# Patient Record
Sex: MALE | Race: WHITE | Employment: FULL TIME | URBAN - METROPOLITAN AREA
[De-identification: names, ages, dates, MRNs, and addresses within clinical notes are randomized per-mention and may not be internally consistent; named-entity substitution may affect disease eponyms.]

---

## 2017-11-10 ENCOUNTER — OFFICE VISIT (OUTPATIENT)
Dept: FAMILY MEDICINE | Facility: CLINIC | Age: 52
End: 2017-11-10
Payer: COMMERCIAL

## 2017-11-10 VITALS
RESPIRATION RATE: 18 BRPM | SYSTOLIC BLOOD PRESSURE: 138 MMHG | HEIGHT: 71 IN | WEIGHT: 197.1 LBS | DIASTOLIC BLOOD PRESSURE: 80 MMHG | OXYGEN SATURATION: 100 % | HEART RATE: 64 BPM | TEMPERATURE: 97.5 F | BODY MASS INDEX: 27.59 KG/M2

## 2017-11-10 DIAGNOSIS — B35.1 ONYCHOMYCOSIS: ICD-10-CM

## 2017-11-10 DIAGNOSIS — Z00.00 ROUTINE GENERAL MEDICAL EXAMINATION AT A HEALTH CARE FACILITY: Primary | ICD-10-CM

## 2017-11-10 DIAGNOSIS — Z12.11 SPECIAL SCREENING FOR MALIGNANT NEOPLASMS, COLON: ICD-10-CM

## 2017-11-10 LAB
CHOLEST SERPL-MCNC: 230 MG/DL
GLUCOSE SERPL-MCNC: 98 MG/DL (ref 70–99)
HDLC SERPL-MCNC: 59 MG/DL
LDLC SERPL CALC-MCNC: 163 MG/DL
NONHDLC SERPL-MCNC: 171 MG/DL
PSA SERPL-ACNC: 0.74 UG/L (ref 0–4)
TRIGL SERPL-MCNC: 42 MG/DL

## 2017-11-10 PROCEDURE — 82947 ASSAY GLUCOSE BLOOD QUANT: CPT | Performed by: FAMILY MEDICINE

## 2017-11-10 PROCEDURE — 99396 PREV VISIT EST AGE 40-64: CPT | Performed by: FAMILY MEDICINE

## 2017-11-10 PROCEDURE — 80061 LIPID PANEL: CPT | Performed by: FAMILY MEDICINE

## 2017-11-10 PROCEDURE — 36415 COLL VENOUS BLD VENIPUNCTURE: CPT | Performed by: FAMILY MEDICINE

## 2017-11-10 PROCEDURE — G0103 PSA SCREENING: HCPCS | Performed by: FAMILY MEDICINE

## 2017-11-10 RX ORDER — TERBINAFINE HYDROCHLORIDE 250 MG/1
250 TABLET ORAL DAILY
Qty: 90 TABLET | Refills: 0 | Status: SHIPPED | OUTPATIENT
Start: 2017-11-10 | End: 2018-10-24

## 2017-11-10 ASSESSMENT — PAIN SCALES - GENERAL: PAINLEVEL: NO PAIN (0)

## 2017-11-10 NOTE — NURSING NOTE
"Chief Complaint   Patient presents with     Physical     pt. is fasting       Initial /80 (BP Location: Right arm, Patient Position: Sitting, Cuff Size: Adult Regular)  Pulse 64  Temp 97.5  F (36.4  C) (Oral)  Resp 18  Ht 1.803 m (5' 11\")  Wt 89.4 kg (197 lb 1.6 oz)  SpO2 100%  BMI 27.49 kg/m2 Estimated body mass index is 27.49 kg/(m^2) as calculated from the following:    Height as of this encounter: 1.803 m (5' 11\").    Weight as of this encounter: 89.4 kg (197 lb 1.6 oz).  Medication Reconciliation: alyson Hickey      "

## 2017-11-10 NOTE — MR AVS SNAPSHOT
After Visit Summary   11/10/2017    Carlos A Crespo    MRN: [id number]           Patient Information     Date Of Birth          1965        Visit Information        Provider Department      11/10/2017 10:40 AM Frances Upton MD [facility]        Today's Diagnoses     Routine general medical examination at a health care facility    -  1    Onychomycosis        Special screening for malignant neoplasms, colon          Care Instructions      Preventive Health Recommendations  Male Ages 50 - 64    Yearly exam:             See your health care provider every year in order to  o   Review health changes.   o   Discuss preventive care.    o   Review your medicines if your doctor has prescribed any.     Have a cholesterol test every 5 years, or more frequently if you are at risk for high cholesterol/heart disease.     Have a diabetes test (fasting glucose) every three years. If you are at risk for diabetes, you should have this test more often.     Have a colonoscopy at age 50, or have a yearly FIT test (stool test). These exams will check for colon cancer.      Talk with your health care provider about whether or not a prostate cancer screening test (PSA) is right for you.    You should be tested each year for STDs (sexually transmitted diseases), if you re at risk.     Shots: Get a flu shot each year. Get a tetanus shot every 10 years.     Nutrition:    Eat at least 5 servings of fruits and vegetables daily.     Eat whole-grain bread, whole-wheat pasta and brown rice instead of white grains and rice.     Talk to your provider about Calcium and Vitamin D.     Lifestyle    Exercise for at least 150 minutes a week (30 minutes a day, 5 days a week). This will help you control your weight and prevent disease.     Limit alcohol to one drink per day.     No smoking.     Wear sunscreen to prevent skin cancer.     See your dentist every six months for an exam and cleaning.     See your  eye doctor every 1 to 2 years.            Follow-ups after your visit        Additional Services     GASTROENTEROLOGY ADULT REF PROCEDURE ONLY       Last Lab Result: Creatinine (mg/dL)       Date                     Value                 09/23/2014               0.94             ----------  Body mass index is 27.49 kg/(m^2).     Needed:  No  Language:  English    Patient will be contacted to schedule procedure.     Please be aware that coverage of these services is subject to the terms and limitations of your health insurance plan.  Call member services at your health plan with any benefit or coverage questions.  Any procedures must be performed at a [facility] facility OR coordinated by your clinic's referral office.    Please bring the following with you to your appointment:    (1) Any X-Rays, CTs or MRIs which have been performed.  Contact the facility where they were done to arrange for  prior to your scheduled appointment.    (2) List of current medications   (3) This referral request   (4) Any documents/labs given to you for this referral                  Follow-up notes from your care team     Return in about 1 year (around 11/10/2018).      Who to contact     If you have questions or need follow up information about today's clinic visit or your schedule please contact [facility] directly at [phone].  Normal or non-critical lab and imaging results will be communicated to you by MyChart, letter or phone within 4 business days after the clinic has received the results. If you do not hear from us within 7 days, please contact the clinic through MyChart or phone. If you have a critical or abnormal lab result, we will notify you by phone as soon as possible.  Submit refill requests through [employer] or call your pharmacy and they will forward the refill request to us. Please allow 3 business days for your refill to be completed.          Additional Information About Your Visit    "     MyChart Information     [employer] gives you secure access to your electronic health record. If you see a primary care provider, you can also send messages to your care team and make appointments. If you have questions, please call your primary care clinic.  If you do not have a primary care provider, please call [phone] and they will assist you.        Care EveryWhere ID     This is your Care EveryWhere ID. This could be used by other organizations to access your [facility] medical records  [id number]        Your Vitals Were     Pulse Temperature Respirations Height Pulse Oximetry BMI (Body Mass Index)    64 97.5  F (36.4  C) (Oral) 18 1.803 m (5' 11\") 100% 27.49 kg/m2       Blood Pressure from Last 3 Encounters:   11/10/17 138/80   10/25/16 134/82   06/02/16 122/79    Weight from Last 3 Encounters:   11/10/17 89.4 kg (197 lb 1.6 oz)   10/25/16 88 kg (194 lb)   06/02/16 86.1 kg (189 lb 14.4 oz)              We Performed the Following     GASTROENTEROLOGY ADULT REF PROCEDURE ONLY     Glucose     Lipid panel reflex to direct LDL Fasting     Prostate spec antigen screen          Today's Medication Changes          These changes are accurate as of: 11/10/17 11:26 AM.  If you have any questions, ask your nurse or doctor.               Start taking these medicines.        Dose/Directions    terbinafine 250 MG tablet   Commonly known as:  lamISIL   Used for:  Onychomycosis   Started by:  Frances Upton MD        Dose:  250 mg   Take 1 tablet (250 mg) by mouth daily   Quantity:  90 tablet   Refills:  0            Where to get your medicines      These medications were sent to [facility]/pharmacy #[id number] - [place], [place] - [address]  [address] [facility] [address]     Phone:  [phone]     terbinafine 250 MG tablet                Primary Care Provider Office Phone # Fax #    Frances Upton [phone] [address] [address]        Equal " Access to Services     [facility]: Hadii aad ku hadangelikanaina Zenobiaradha, wasejalda luqadaha, qaybta karayabel schmidt. So [facility] [phone].    ATENCIÓN: Si habla español, tiene a munoz disposición servicios gratuitos de asistencia lingüística. Llame al [phone].    We comply with applicable federal civil rights laws and [place] laws. We do not discriminate on the basis of race, color, national origin, age, disability, sex, sexual orientation, or gender identity.            Thank you!     Thank you for choosing [facility]  for your care. Our goal is always to provide you with excellent care. Hearing back from our patients is one way we can continue to improve our services. Please take a few minutes to complete the written survey that you may receive in the mail after your visit with us. Thank you!             Your Updated Medication List - Protect others around you: Learn how to safely use, store and throw away your medicines at www.disposemymeds.org.          This list is accurate as of: 11/10/17 11:26 AM.  Always use your most recent med list.                   Brand Name Dispense Instructions for use Diagnosis    albuterol 108 (90 BASE) MCG/ACT Inhaler    PROAIR HFA/PROVENTIL HFA/VENTOLIN HFA    1 Inhaler    Inhale 2 puffs into the lungs every 4 hours as needed for shortness of breath / dyspnea or wheezing    Viral URI with cough       ibuprofen 200 MG tablet    ADVIL/MOTRIN     Take 200 mg by mouth every 4 hours as needed for mild pain        terbinafine 250 MG tablet    lamISIL    90 tablet    Take 1 tablet (250 mg) by mouth daily    Onychomycosis

## 2017-11-10 NOTE — PROGRESS NOTES
SUBJECTIVE:   CC: Calros A Crespo is an 52 year old male who presents for preventative health visit.     Healthy Habits:    Do you get at least three servings of calcium containing foods daily (dairy, green leafy vegetables, etc.)? yes    Amount of exercise or daily activities, outside of work: 8 hour(s) per day    Problems taking medications regularly No    Medication side effects: No    Have you had an eye exam in the past two years? no    Do you see a dentist twice per year? yes    Do you have sleep apnea, excessive snoring or daytime drowsiness?no        Today's PHQ-2 Score:   PHQ-2 ( 1999 [employer]) 11/10/2017 10/25/2016   Q1: Little interest or pleasure in doing things 0 0   Q2: Feeling down, depressed or hopeless 0 2   PHQ-2 Score 0 2   Q1: Little interest or pleasure in doing things - -   Q2: Feeling down, depressed or hopeless - -   PHQ-2 Score - -         Abuse: Current or Past(Physical, Sexual or Emotional)- No  Do you feel safe in your environment - Yes  Social History   Substance Use Topics     Smoking status: Never Smoker     Smokeless tobacco: Never Used     Alcohol use Yes      Comment: occassional - 1 to 2 times a year     The patient does not drink >3 drinks per day nor >7 drinks per week.    Last PSA: No results found for: PSA    Reviewed orders with patient. Reviewed health maintenance and updated orders accordingly - Yes  Labs reviewed in EPIC    Reviewed and updated as needed this visit by clinical staff  Tobacco  Allergies  Meds  Med Hx  Surg Hx  Fam Hx  Soc Hx        Reviewed and updated as needed this visit by Provider        Here today for routine checkup  For the most part is doing well but does note some possible fungus of toenails. Had some trauma to his right great toenail earlier this year and it has been growing funny ever since but that's not really the nail the bothers him that much. His smaller nails are getting thickened and yellow it hard to cut      ROS:  C: NEGATIVE for  "fever, chills, change in weight  INTEGUMENTARY/SKIN: As above  E: NEGATIVE for vision changes or irritation  ENT: NEGATIVE for ear, mouth and throat problems  R: NEGATIVE for significant cough or SOB  CV: NEGATIVE for chest pain, palpitations or peripheral edema  GI: NEGATIVE for nausea, abdominal pain, heartburn, or change in bowel habits   male: negative for dysuria, hematuria, decreased urinary stream, erectile dysfunction, urethral discharge  M: NEGATIVE for significant arthralgias or myalgia  N: NEGATIVE for weakness, dizziness or paresthesias  P: NEGATIVE for changes in mood or affect    OBJECTIVE:   /80 (BP Location: Right arm, Patient Position: Sitting, Cuff Size: Adult Regular)  Pulse 64  Temp 97.5  F (36.4  C) (Oral)  Resp 18  Ht 1.803 m (5' 11\")  Wt 89.4 kg (197 lb 1.6 oz)  SpO2 100%  BMI 27.49 kg/m2  EXAM:  GENERAL: healthy, alert and no distress  EYES: Eyes grossly normal to inspection, PERRL and conjunctivae and sclerae normal  HENT: ear canals and TM's normal, nose and mouth without ulcers or lesions  NECK: no adenopathy, no asymmetry, masses, or scars and thyroid normal to palpation  RESP: lungs clear to auscultation - no rales, rhonchi or wheezes  CV: regular rate and rhythm, normal S1 S2, no S3 or S4, no murmur, click or rub, no peripheral edema and peripheral pulses strong  ABDOMEN: soft, nontender, no hepatosplenomegaly, no masses and bowel sounds normal  MS: no gross musculoskeletal defects noted, no edema  SKIN: no suspicious lesions or rashes.  Right great toenail is thick and dark and curved medially.  The second and fourth toenails are thickened with a dark and yellow subungual debris  NEURO: Normal strength and tone, mentation intact and speech normal  PSYCH: mentation appears normal, affect normal/bright    ASSESSMENT/PLAN:   1. Routine general medical examination at a health care facility  Has a history some fairly elevated cholesterol like to recheck this and see what the " "status is. Biometric screening form today  - Glucose  - Lipid panel reflex to direct LDL Fasting  - PSA  We will set up colon cancer screening as well    2. Onychomycosis  Discussed mechanism of action of the proposed medication, as well as potential effects, both good and bad.  Patient expressed understanding and agreed with treatment.   - terbinafine (LAMISIL) 250 MG tablet; Take 1 tablet (250 mg) by mouth daily  Dispense: 90 tablet; Refill: 0    COUNSELING:  Reviewed preventive health counseling, as reflected in patient instructions       Regular exercise       Healthy diet/nutrition       Vision screening       Colon cancer screening       Prostate cancer screening           reports that he has never smoked. He has never used smokeless tobacco.      Estimated body mass index is 27.49 kg/(m^2) as calculated from the following:    Height as of this encounter: 1.803 m (5' 11\").    Weight as of this encounter: 89.4 kg (197 lb 1.6 oz). colon can scre          Counseling Resources:  ATP IV Guidelines  Pooled Cohorts Equation Calculator  FRAX Risk Assessment  ICSI Preventive Guidelines  Dietary Guidelines for Americans, 2010  USDA's MyPlate  ASA Prophylaxis  Lung CA Screening    Frances Upton MD  [facility]  "

## 2017-11-15 DIAGNOSIS — J06.9 VIRAL URI WITH COUGH: ICD-10-CM

## 2017-11-15 NOTE — TELEPHONE ENCOUNTER
Provider to advise.    Charline Olvera RN   [facility] Triage

## 2017-11-15 NOTE — TELEPHONE ENCOUNTER
Reason for Call:  Medication or medication refill:    Do you use a [facility]?  Name of the pharmacy and phone number for the current request:  [facility]/pharmacy #[id number] - [place], [place] - [address]    Name of the medication requested:     Other request: terbinafine (LAMISIL) 250 MG tablet was prescribed on 11/10/2017 for toenail fungus and now has ringing in his ear and asking is this could be due to the medication?    Can we leave a detailed message on this number? YES    Phone number patient can be reached at: Work number on file:  [phone] (work)    Best Time: any    Call taken on 11/15/2017 at 8:02 AM by Lisa [facility]

## 2017-11-15 NOTE — TELEPHONE ENCOUNTER
Informed patient - pt agreeable to plan and will follow up as recommended/as needed if sx's return    Will Cj SINGLETARY

## 2017-11-15 NOTE — TELEPHONE ENCOUNTER
Potentially, though I wouldn't think of it as a common side effect.  Here's what he should do - stop the medicine for a few days and let it work out of his system until his symptoms have resolved. Then he could try it again and see if the symptoms return. It's not a dangerous side effect, so there would be no harm in trying it a second time. This way we might know for sure

## 2017-11-22 ENCOUNTER — OFFICE VISIT (OUTPATIENT)
Dept: URGENT CARE | Facility: URGENT CARE | Age: 52
End: 2017-11-22
Payer: COMMERCIAL

## 2017-11-22 VITALS
HEART RATE: 65 BPM | TEMPERATURE: 98.1 F | OXYGEN SATURATION: 99 % | SYSTOLIC BLOOD PRESSURE: 141 MMHG | WEIGHT: 201 LBS | DIASTOLIC BLOOD PRESSURE: 97 MMHG | BODY MASS INDEX: 28.03 KG/M2

## 2017-11-22 DIAGNOSIS — H93.11 TINNITUS OF RIGHT EAR: Primary | ICD-10-CM

## 2017-11-22 PROCEDURE — 99213 OFFICE O/P EST LOW 20 MIN: CPT | Performed by: NURSE PRACTITIONER

## 2017-11-22 ASSESSMENT — ENCOUNTER SYMPTOMS
NEUROLOGICAL NEGATIVE: 1
CARDIOVASCULAR NEGATIVE: 1
CONSTITUTIONAL NEGATIVE: 1
RESPIRATORY NEGATIVE: 1
MUSCULOSKELETAL NEGATIVE: 1
EYES NEGATIVE: 1
PSYCHIATRIC NEGATIVE: 1
GASTROINTESTINAL NEGATIVE: 1

## 2017-11-23 NOTE — NURSING NOTE
"Chief Complaint   Patient presents with     Ear Problem     Pt c/o right ear problem.       Initial BP (!) 141/97 (BP Location: Left arm, Patient Position: Chair, Cuff Size: Adult Large)  Pulse 65  Temp 98.1  F (36.7  C) (Oral)  Wt 201 lb (91.2 kg)  SpO2 99%  BMI 28.03 kg/m2 Estimated body mass index is 28.03 kg/(m^2) as calculated from the following:    Height as of 11/10/17: 5' 11\" (1.803 m).    Weight as of this encounter: 201 lb (91.2 kg).  Medication Reconciliation: complete     Tali Thornton CMA (AAMA)      "

## 2017-11-23 NOTE — PATIENT INSTRUCTIONS
Tinnitus (Ringing in the Ears)     Treatment may include maskers and hearing aids.     Tinnitus is the term for a noise in your ear not caused by an outside sound. The noise might be a ringing, clicking, hiss, or roar. It can vary in pitch and may be soft or quite loud. For some people, tinnitus is a minor nuisance. But for others, the noise can make it hard to hear, work, and even sleep. When tinnitus can't be cured, a number of treatments may offer relief.  What causes tinnitus?  Loud noises, hearing loss, and ear wax can cause tinnitus. So can certain medicines. Large amounts of aspirin or caffeine are sometimes to blame. In many cases, the exact cause of tinnitus is unknown.  How is tinnitus treated?  Identifying and removing the cause is the best way to treat tinnitus. For that reason, your healthcare provider may refer you to an otolaryngologist (ear, nose, and throat doctor). Your hearing may also be checked by an audiologist (hearing specialist). If you have hearing loss, wearing a hearing aid may help your tinnitus. When the cause can't be found, the tinnitus itself may be treated. Some of the treatments are listed below, and your healthcare provider can tell you more about them:    Maskers are small devices that look like hearing aids. They emit a pleasant sound that helps cover up the ringing in your ears. Hearing aids and maskers are sometimes used together.    Medicines that treat anxiety and depression may ease tinnitus in some people.    Hypnosis or relaxation therapy may help head noise seem less severe.    Tinnitus retraining therapy combines counseling and maskers. Both can help take your mind off the tinnitus.  For more information    American Speech-Hearing-Language Association [phone] www.mau.org    American Tinnitus Association [phone] www.fannie.org    National [place] on Deafness and other Communication Disorders [phone] www.nidcd.nih.gov   Date Last Reviewed: 7/1/2016     4328-0531 [employer]. [address] [place] [address], [place] [address]. All rights reserved. This information is not intended as a substitute for professional medical care. Always follow your healthcare professional's instructions.

## 2017-11-23 NOTE — PROGRESS NOTES
SUBJECTIVE:   Carlos A Crespo is a 52 year old male presenting with a chief complaint of   Chief Complaint   Patient presents with     Ear Problem     Pt c/o right ear problem.   .    Onset of symptoms was 9 day(s) ago.  Course of illness is same.    Severity moderate  Current and Associated symptoms: ear pain right, ringing noise  Treatment measures tried include Fluids and Rest, ibuprofen.  Predisposing factors include None.  No cold symptoms      Review of Systems   Constitutional: Negative.    HENT: Positive for tinnitus. Negative for ear discharge, ear pain and hearing loss.    Eyes: Negative.    Respiratory: Negative.    Cardiovascular: Negative.    Gastrointestinal: Negative.    Genitourinary: Negative.    Musculoskeletal: Negative.    Skin: Negative.    Neurological: Negative.    Endo/Heme/Allergies: Negative.    Psychiatric/Behavioral: Negative.          Past Medical History:   Diagnosis Date     Allergic rhinitis, cause unspecified      Carpal tunnel syndrome     RIght wrist off and on.     Low back pain      Scoliosis of thoracic spine      Current Outpatient Prescriptions   Medication Sig Dispense Refill     terbinafine (LAMISIL) 250 MG tablet Take 1 tablet (250 mg) by mouth daily 90 tablet 0     ibuprofen (ADVIL,MOTRIN) 200 MG tablet Take 200 mg by mouth every 4 hours as needed for mild pain       albuterol (PROAIR HFA, PROVENTIL HFA, VENTOLIN HFA) 108 (90 BASE) MCG/ACT inhaler Inhale 2 puffs into the lungs every 4 hours as needed for shortness of breath / dyspnea or wheezing 1 Inhaler 0     Social History   Substance Use Topics     Smoking status: Never Smoker     Smokeless tobacco: Never Used     Alcohol use Yes      Comment: occassional - 1 to 2 times a year       OBJECTIVE  BP (!) 141/97 (BP Location: Left arm, Patient Position: Chair, Cuff Size: Adult Large)  Pulse 65  Temp 98.1  F (36.7  C) (Oral)  Wt 201 lb (91.2 kg)  SpO2 99%  BMI 28.03 kg/m2    Physical Exam   Constitutional: He is  oriented to person, place, and time.   HENT:   Head: Normocephalic and atraumatic.   Right Ear: External ear normal.   Left Ear: External ear normal.   Nose: Nose normal.   Mouth/Throat: Oropharynx is clear and moist.   Eyes: Conjunctivae and EOM are normal. Pupils are equal, round, and reactive to light.   Neck: Normal range of motion. Neck supple.   Cardiovascular: Normal rate, regular rhythm and normal heart sounds.    Pulmonary/Chest: Effort normal and breath sounds normal.   Neurological: He is alert and oriented to person, place, and time.   Psychiatric: Affect normal.   Nursing note and vitals reviewed.      ASSESSMENT:    (H93.11) Tinnitus of right ear  (primary encounter diagnosis)  Discussed possible causes of tinnitus, advised decreasing caffeine    PLAN:  OTOLARYNGOLOGY REFERRAL to follow up if needed         ADRI Choi CNP

## 2017-12-14 ENCOUNTER — MYC MEDICAL ADVICE (OUTPATIENT)
Dept: FAMILY MEDICINE | Facility: CLINIC | Age: 52
End: 2017-12-14

## 2017-12-14 DIAGNOSIS — J06.9 VIRAL URI WITH COUGH: ICD-10-CM

## 2017-12-14 RX ORDER — ALBUTEROL SULFATE 90 UG/1
2 AEROSOL, METERED RESPIRATORY (INHALATION) EVERY 4 HOURS PRN
Qty: 1 INHALER | Refills: 0 | Status: CANCELLED | OUTPATIENT
Start: 2017-12-14

## 2017-12-15 NOTE — TELEPHONE ENCOUNTER
Pt returned call    Best number to reach caller: Home number on file [phone] (home)    Is it ok to leave a detailed message: unsure - had to hang up     Patient diagnosed with some type of viral issue; hung up, will call back

## 2017-12-15 NOTE — TELEPHONE ENCOUNTER
Patient reports that medication is for a viral infection.  Patient denies having asthma. RN explained that he would either need to be seen in clinic or have an E-Visit done.  Patient states that he is unable to be seen today.  RN informed the patient of Urgent Care hours.  Patient states that he was hoping to get an inhaler without being seen.  Patient upset states that the clinic doesn't care.  RN tried to explain, but patient kept repeating that the clinic doesn't care if he dies on the floor.  RN said that if he is having breathing problems he needs to go to the ER.  Patient repeats that the clinic doesn't care and said good bye.  Throughout the conversation patient was talking in complete sentences.  RN didn't hear any audible wheezing or coughing.    Yoana Vanegas RN

## 2017-12-15 NOTE — TELEPHONE ENCOUNTER
Is this for an illness?  If so, needs OFV or phone  E- visit    Or does he carry a diagnosis of asthma we don't know about?

## 2017-12-15 NOTE — TELEPHONE ENCOUNTER
This writer attempted to contact Carlos A on 12/15/17      Reason for call verify if sick or asthma diagnosis and left detailed message.      If patient calls back:   Patient contacted by clinic RN team. Inform patient that someone from the team will contact them, document that pt called and route to care team. .        Katt Bunch, KIERAN

## 2017-12-15 NOTE — TELEPHONE ENCOUNTER
Routing refill request to provider for review/approval because:  RN can not fill with the associated diagnosis.    Katt Bunch RN, [facility]

## 2017-12-15 NOTE — TELEPHONE ENCOUNTER
Reason for Call:  Medication or medication refill:    Do you use a [facility]?  Name of the pharmacy and phone number for the current request:  [facility]    Name of the medication requested: Pro-Air Inhaler     Other request: Please call when approved.    Can we leave a detailed message on this number? YES    Phone number patient can be reached at: Cell number on file:    Telephone Information:   Mobile [phone]       Best Time: any     Call taken on 12/15/2017 at 11:55 AM by Soumya Castaneda

## 2017-12-19 RX ORDER — ALBUTEROL SULFATE 90 UG/1
2 AEROSOL, METERED RESPIRATORY (INHALATION) EVERY 4 HOURS PRN
Qty: 1 INHALER | Refills: 0 | OUTPATIENT
Start: 2017-12-19

## 2017-12-19 NOTE — TELEPHONE ENCOUNTER
Refused. Please see encounter dated 12/14/2017. Per Dr. Upton, patient needs to be seen. Patient notified via phone and MyChart.    Charline Olvera RN   [facility]

## 2017-12-22 ENCOUNTER — OFFICE VISIT (OUTPATIENT)
Dept: FAMILY MEDICINE | Facility: CLINIC | Age: 52
End: 2017-12-22
Payer: COMMERCIAL

## 2017-12-22 VITALS
WEIGHT: 207 LBS | TEMPERATURE: 98.4 F | BODY MASS INDEX: 28.87 KG/M2 | OXYGEN SATURATION: 99 % | RESPIRATION RATE: 12 BRPM | HEART RATE: 69 BPM | SYSTOLIC BLOOD PRESSURE: 130 MMHG | DIASTOLIC BLOOD PRESSURE: 90 MMHG

## 2017-12-22 DIAGNOSIS — R03.0 ELEVATED BLOOD-PRESSURE READING WITHOUT DIAGNOSIS OF HYPERTENSION: ICD-10-CM

## 2017-12-22 DIAGNOSIS — J20.9 ACUTE BRONCHITIS, UNSPECIFIED ORGANISM: Primary | ICD-10-CM

## 2017-12-22 PROCEDURE — 99213 OFFICE O/P EST LOW 20 MIN: CPT | Performed by: PHYSICIAN ASSISTANT

## 2017-12-22 RX ORDER — ALBUTEROL SULFATE 90 UG/1
2 AEROSOL, METERED RESPIRATORY (INHALATION) EVERY 6 HOURS PRN
Qty: 1 INHALER | Refills: 0 | Status: SHIPPED | OUTPATIENT
Start: 2017-12-22 | End: 2019-11-09

## 2017-12-22 RX ORDER — AZITHROMYCIN 250 MG/1
TABLET, FILM COATED ORAL
Qty: 6 TABLET | Refills: 0 | Status: SHIPPED | OUTPATIENT
Start: 2017-12-22 | End: 2018-10-24

## 2017-12-22 NOTE — PATIENT INSTRUCTIONS
Schedule colonoscopy at [facility] ([phone]).    zithromax daily for 5 days.     Use albuterol inhaler up to every 4 hours as needed for cough.   Return urgently if any change in symptoms like increasing cough, shortness of breath or other change in symptoms.     Work on diet - no more than 2 grams sodium daily and follow up with one of us In about a month to discuss blood pressure medication.     Schedule colonoscopy at [facility] ([phone]).      NON PRESCRIPTION TREATMENT    Mucinex 600 mg 2 tabs twice a day  Increase humidity to 30-40% in bedroom at night - vaporizer  Avoid decongestant  Saline nasal spray as needed  Increase fluid intake  Benadryl 25mg 1/2 - 1 hour before bed time  Maintain 8 hr minimum of sleep at night  Robitussin DM cough gels for cough

## 2017-12-22 NOTE — PROGRESS NOTES
SUBJECTIVE:   Carlos A Crespo is a 52 year old male who presents to clinic today for the following health issues:      Acute Illness   Acute illness concerns: uri  Onset: 3 weeks ago    Fever: no    Chills/Sweats: no    Headache (location?): YES    Sinus Pressure:YES    Conjunctivitis:  no    Ear Pain: YES: left    Rhinorrhea: YES    Congestion: YES    Sore Throat: no      Cough: YES    Wheeze: no    Decreased Appetite: no    Nausea: no    Vomiting: no    Diarrhea:  no    Dysuria/Freq.: no    Fatigue/Achiness: no    Sick/Strep Exposure: no     Therapies Tried and outcome: cepacol seemed to help a little, decongestant as well which helped a little    Cough productive with clear sputum.  Off blood pressure med for sometime.   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     Reports that he eats a lot of salads but does eat more red meat than he should.      Problem list and histories reviewed & adjusted, as indicated.  Additional history: as documented    Patient Active Problem List   Diagnosis     Scoliosis of thoracic spine     Hyperlipidemia LDL goal <100     Onychomycosis     Past Surgical History:   Procedure Laterality Date     NO HISTORY OF SURGERY         Social History   Substance Use Topics     Smoking status: Never Smoker     Smokeless tobacco: Never Used     Alcohol use Yes      Comment: occassional - 1 to 2 times a year     Family History   Problem Relation Age of Onset     Hypertension Mother      Breast Cancer Mother      DIABETES Father      Hypertension Father      DIABETES Maternal Uncle      CEREBROVASCULAR DISEASE No family hx of      Coronary Artery Disease No family hx of          Current Outpatient Prescriptions   Medication Sig Dispense Refill             1 Inhaler 0     terbinafine (LAMISIL) 250 MG tablet Take 1 tablet (250 mg) by mouth daily 90 tablet 0         Reviewed and updated as needed this visit by clinical staffTobacco  Allergies  Meds  Med Hx  Surg  Hx  Fam Hx  Soc Hx      Reviewed and updated as needed this visit by Provider  Allergies  Meds  Problems         ROS:  Constitutional, HEENT, cardiovascular, pulmonary, gi and gu systems are negative, except as otherwise noted.      OBJECTIVE:   /90 (BP Location: Right arm, Patient Position: Sitting, Cuff Size: Adult Regular)  Pulse 69  Temp 98.4  F (36.9  C) (Oral)  Resp 12  Wt 93.9 kg (207 lb)  SpO2 99%  BMI 28.87 kg/m2  Body mass index is 28.87 kg/(m^2).  GENERAL: healthy, alert and no distress  EYES: Eyes grossly normal to inspection, PERRL and conjunctivae and sclerae normal  HENT: ear canals and TM's normal, nose and mouth without ulcers or lesions  NECK: no adenopathy, no asymmetry, masses, or scars and thyroid normal to palpation  RESP: lungs clear to auscultation - no rales, rhonchi or wheezes  CV: regular rate and rhythm, normal S1 S2, no S3 or S4, no murmur, click or rub, no peripheral edema and peripheral pulses strong  ABDOMEN: soft, nontender, no hepatosplenomegaly, no masses and bowel sounds normal  MS: no gross musculoskeletal defects noted, no edema    Diagnostic Test Results:  none     ASSESSMENT/PLAN:             1. Acute bronchitis, unspecified organism  Albuterol has been helpful in past   - azithromycin (ZITHROMAX) 250 MG tablet; Two tablets first day, then one tablet daily for four days.  Dispense: 6 tablet; Refill: 0  - albuterol (PROAIR HFA/PROVENTIL HFA/VENTOLIN HFA) 108 (90 BASE) MCG/ACT Inhaler; Inhale 2 puffs into the lungs every 6 hours as needed for shortness of breath / dyspnea or wheezing  Dispense: 1 Inhaler; Refill: 0    2. Elevated blood-pressure reading without diagnosis of hypertension  Patient wishes to try dietary and lifestye management of blood pressure - follow up with us in one month    Patient Instructions   Schedule colonoscopy at [facility] ([phone]).    zithromax daily for 5 days.     Use albuterol inhaler  up to every 4 hours as needed for cough.   Return urgently if any change in symptoms like increasing cough, shortness of breath or other change in symptoms.     Work on diet - no more than 2 grams sodium daily and follow up with one of us In about a month to discuss blood pressure medication.     Schedule colonoscopy at [facility] ([phone]).      NON PRESCRIPTION TREATMENT    Mucinex 600 mg 2 tabs twice a day  Increase humidity to 30-40% in bedroom at night - vaporizer  Avoid decongestant  Saline nasal spray as needed  Increase fluid intake  Benadryl 25mg 1/2 - 1 hour before bed time  Maintain 8 hr minimum of sleep at night  Robitussin DM cough gels for cough           Mariely Steele PA-C  [facility]

## 2017-12-22 NOTE — MR AVS SNAPSHOT
After Visit Summary   12/22/2017    Carlos A Crespo    MRN: [id number]           Patient Information     Date Of Birth          1965        Visit Information        Provider Department      12/22/2017 2:20 PM Mariely Steele PA-C [facility]        Today's Diagnoses     Acute bronchitis, unspecified organism    -  1    Screen for colon cancer        Need for prophylactic vaccination and inoculation against influenza          Care Instructions    Schedule colonoscopy at [facility] ([phone]).    zithromax daily for 5 days.     Use albuterol inhaler up to every 4 hours as needed for cough.   Return urgently if any change in symptoms like increasing cough, shortness of breath or other change in symptoms.     Work on diet - no more than 2 grams sodium daily and follow up with one of us In about a month to discuss blood pressure medication.     Schedule colonoscopy at [facility] ([phone]).      NON PRESCRIPTION TREATMENT    Mucinex 600 mg 2 tabs twice a day  Increase humidity to 30-40% in bedroom at night - vaporizer  Avoid decongestant  Saline nasal spray as needed  Increase fluid intake  Benadryl 25mg 1/2 - 1 hour before bed time  Maintain 8 hr minimum of sleep at night  Robitussin DM cough gels for cough              Follow-ups after your visit        Who to contact     If you have questions or need follow up information about today's clinic visit or your schedule please contact [facility] directly at [phone].  Normal or non-critical lab and imaging results will be communicated to you by MyChart, letter or phone within 4 business days after the clinic has received the results. If you do not hear from us within 7 days, please contact the clinic through MyChart or phone. If you have a critical or abnormal lab result, we will notify you by phone as soon as  possible.  Submit refill requests through [employer] or call your pharmacy and they will forward the refill request to us. Please allow 3 business days for your refill to be completed.          Additional Information About Your Visit        [employer] Information     [employer] gives you secure access to your electronic health record. If you see a primary care provider, you can also send messages to your care team and make appointments. If you have questions, please call your primary care clinic.  If you do not have a primary care provider, please call [phone] and they will assist you.        Care EveryWhere ID     This is your Care EveryWhere ID. This could be used by other organizations to access your [facility] medical records  [id number]        Your Vitals Were     Pulse Temperature Respirations Pulse Oximetry BMI (Body Mass Index)       69 98.4  F (36.9  C) (Oral) 12 99% 28.87 kg/m2        Blood Pressure from Last 3 Encounters:   12/22/17 130/90   11/22/17 (!) 141/97   11/10/17 138/80    Weight from Last 3 Encounters:   12/22/17 93.9 kg (207 lb)   11/22/17 91.2 kg (201 lb)   11/10/17 89.4 kg (197 lb 1.6 oz)              Today, you had the following     No orders found for display         Today's Medication Changes          These changes are accurate as of: 12/22/17  2:53 PM.  If you have any questions, ask your nurse or doctor.               Start taking these medicines.        Dose/Directions    albuterol 108 (90 BASE) MCG/ACT Inhaler   Commonly known as:  PROAIR HFA/PROVENTIL HFA/VENTOLIN HFA   Used for:  Acute bronchitis, unspecified organism   Started by:  Mariely Steele PA-C        Dose:  2 puff   Inhale 2 puffs into the lungs every 6 hours as needed for shortness of breath / dyspnea or wheezing   Quantity:  1 Inhaler   Refills:  0       azithromycin 250 MG tablet   Commonly known as:  ZITHROMAX   Used for:  Acute bronchitis, unspecified organism   Started by:  Mariely Steele PA-C        Two  tablets first day, then one tablet daily for four days.   Quantity:  6 tablet   Refills:  0            Where to get your medicines      These medications were sent to [facility]/pharmacy #[id number] - [place], [place] - [address]  [address] [place] [address]     Phone:  [phone]     albuterol 108 (90 BASE) MCG/ACT Inhaler    azithromycin 250 MG tablet                Primary Care Provider Office Phone # Fax #    Frances Raji Upton [phone] [address] [address]        Equal Access to Services     [facility]: Hadii aad ku hadasho Soomaali, waaxda luqadaha, qaybta kaalmada adeegyada, waxconnie crocker hayromy summers . So [facility] [phone].    ATENCIÓN: Si habla español, tiene a munoz disposición servicios gratuitos de asistencia lingüística. [facility] [phone].    We comply with applicable federal civil rights laws and [place] laws. We do not discriminate on the basis of race, color, national origin, age, disability, sex, sexual orientation, or gender identity.            Thank you!     Thank you for choosing [facility]  for your care. Our goal is always to provide you with excellent care. Hearing back from our patients is one way we can continue to improve our services. Please take a few minutes to complete the written survey that you may receive in the mail after your visit with us. Thank you!             Your Updated Medication List - Protect others around you: Learn how to safely use, store and throw away your medicines at www.disposemymeds.org.          This list is accurate as of: 12/22/17  2:53 PM.  Always use your most recent med list.                   Brand Name Dispense Instructions for use Diagnosis    albuterol 108 (90 BASE) MCG/ACT Inhaler    PROAIR HFA/PROVENTIL HFA/VENTOLIN HFA    1 Inhaler    Inhale 2 puffs into the lungs every 6 hours as needed for shortness of breath / dyspnea or wheezing    Acute  bronchitis, unspecified organism       azithromycin 250 MG tablet    ZITHROMAX    6 tablet    Two tablets first day, then one tablet daily for four days.    Acute bronchitis, unspecified organism       terbinafine 250 MG tablet    lamISIL    90 tablet    Take 1 tablet (250 mg) by mouth daily    Onychomycosis

## 2017-12-22 NOTE — NURSING NOTE
"Chief Complaint   Patient presents with     URI       Initial /90 (BP Location: Right arm, Patient Position: Sitting, Cuff Size: Adult Regular)  Pulse 69  Temp 98.4  F (36.9  C) (Oral)  Resp 12  Wt 93.9 kg (207 lb)  SpO2 99%  BMI 28.87 kg/m2 Estimated body mass index is 28.87 kg/(m^2) as calculated from the following:    Height as of 11/10/17: 1.803 m (5' 11\").    Weight as of this encounter: 93.9 kg (207 lb).  Medication Reconciliation: alyson Bush        "

## 2017-12-25 PROBLEM — R03.0 ELEVATED BLOOD-PRESSURE READING WITHOUT DIAGNOSIS OF HYPERTENSION: Status: ACTIVE | Noted: 2017-12-25

## 2018-03-14 ENCOUNTER — OFFICE VISIT (OUTPATIENT)
Dept: URGENT CARE | Facility: URGENT CARE | Age: 53
End: 2018-03-14
Payer: COMMERCIAL

## 2018-03-14 VITALS
DIASTOLIC BLOOD PRESSURE: 88 MMHG | SYSTOLIC BLOOD PRESSURE: 136 MMHG | HEIGHT: 72 IN | WEIGHT: 200 LBS | TEMPERATURE: 97.6 F | HEART RATE: 65 BPM | RESPIRATION RATE: 16 BRPM | OXYGEN SATURATION: 99 % | BODY MASS INDEX: 27.09 KG/M2

## 2018-03-14 DIAGNOSIS — R68.89 FLU-LIKE SYMPTOMS: Primary | ICD-10-CM

## 2018-03-14 LAB
FLUAV+FLUBV AG SPEC QL: NEGATIVE
FLUAV+FLUBV AG SPEC QL: NEGATIVE
SPECIMEN SOURCE: NORMAL

## 2018-03-14 PROCEDURE — 87804 INFLUENZA ASSAY W/OPTIC: CPT | Performed by: NURSE PRACTITIONER

## 2018-03-14 PROCEDURE — 99213 OFFICE O/P EST LOW 20 MIN: CPT | Performed by: NURSE PRACTITIONER

## 2018-03-14 RX ORDER — OSELTAMIVIR PHOSPHATE 75 MG/1
75 CAPSULE ORAL 2 TIMES DAILY
Qty: 10 CAPSULE | Refills: 0 | Status: SHIPPED | OUTPATIENT
Start: 2018-03-14 | End: 2018-03-19

## 2018-03-14 ASSESSMENT — ENCOUNTER SYMPTOMS
DIAPHORESIS: 0
SHORTNESS OF BREATH: 0
RHINORRHEA: 1
SORE THROAT: 0
NAUSEA: 0
COUGH: 1
FEVER: 1
VOMITING: 0
CHILLS: 1
MYALGIAS: 1
FATIGUE: 1
DIARRHEA: 0

## 2018-03-14 ASSESSMENT — PAIN SCALES - GENERAL: PAINLEVEL: NO PAIN (0)

## 2018-03-14 NOTE — PROGRESS NOTES
.  SUBJECTIVE:   Carlos A Crespo is a 52 year old male presenting with a chief complaint of   Chief Complaint   Patient presents with     Urgent Care     URI     Started Saturday with head congestion, cough, body aches, chills and fever.        He is an established patient of [facility].    Onset of symptoms was 3 day(s) ago.  Course of illness is worsening.    Severity moderate  Current and Associated symptoms: fever, chills, runny nose, stuffy nose, cough - productive, body aches and nausea  Treatment measures tried include None tried.  Predisposing factors include None.      Review of Systems   Constitutional: Positive for chills, fatigue and fever. Negative for diaphoresis.   HENT: Positive for congestion and rhinorrhea. Negative for ear pain and sore throat.    Respiratory: Positive for cough. Negative for shortness of breath.    Gastrointestinal: Negative for diarrhea, nausea and vomiting.   Musculoskeletal: Positive for myalgias.       Past Medical History:   Diagnosis Date     Allergic rhinitis, cause unspecified      Carpal tunnel syndrome     RIght wrist off and on.     Low back pain      Scoliosis of thoracic spine      Family History   Problem Relation Age of Onset     Hypertension Mother      Breast Cancer Mother      DIABETES Father      Hypertension Father      DIABETES Maternal Uncle      CEREBROVASCULAR DISEASE No family hx of      Coronary Artery Disease No family hx of      Current Outpatient Prescriptions   Medication Sig Dispense Refill     oseltamivir (TAMIFLU) 75 MG capsule Take 1 capsule (75 mg) by mouth 2 times daily for 5 days 10 capsule 0     albuterol (PROAIR HFA/PROVENTIL HFA/VENTOLIN HFA) 108 (90 BASE) MCG/ACT Inhaler Inhale 2 puffs into the lungs every 6 hours as needed for shortness of breath / dyspnea or wheezing 1 Inhaler 0     azithromycin (ZITHROMAX) 250 MG tablet Two tablets first day, then one tablet daily for four days. (Patient not taking: Reported on 3/14/2018) 6 tablet 0      terbinafine (LAMISIL) 250 MG tablet Take 1 tablet (250 mg) by mouth daily (Patient not taking: Reported on 3/14/2018) 90 tablet 0     Social History   Substance Use Topics     Smoking status: Never Smoker     Smokeless tobacco: Never Used     Alcohol use Yes      Comment: occassional - 1 to 2 times a year       OBJECTIVE  /88 (BP Location: Left arm, Patient Position: Sitting, Cuff Size: Adult Large)  Pulse 65  Temp 97.6  F (36.4  C) (Tympanic)  Resp 16  Ht 6' (1.829 m)  Wt 200 lb (90.7 kg)  SpO2 99%  BMI 27.12 kg/m2    Physical Exam   Constitutional: He appears well-developed and well-nourished. No distress.   HENT:   Head: Normocephalic and atraumatic.   Right Ear: Tympanic membrane and external ear normal.   Left Ear: Tympanic membrane and external ear normal.   Nose: Mucosal edema and rhinorrhea present.   Mouth/Throat: Oropharynx is clear and moist.   Eyes: EOM are normal. Pupils are equal, round, and reactive to light.   Neck: Normal range of motion. Neck supple.   Pulmonary/Chest: Effort normal and breath sounds normal. No respiratory distress.   Lymphadenopathy:     He has no cervical adenopathy.   Neurological: He is alert. No cranial nerve deficit.   Skin: Skin is warm and dry. He is not diaphoretic.   Psychiatric: He has a normal mood and affect.   Nursing note and vitals reviewed.      Labs:  Results for orders placed or performed in visit on 03/14/18 (from the past 24 hour(s))   Influenza A/B antigen   Result Value Ref Range    Influenza A/B Agn Specimen Nasal     Influenza A Negative NEG^Negative    Influenza B Negative NEG^Negative         ASSESSMENT:      ICD-10-CM    1. Flu-like symptoms R68.89 Influenza A/B antigen     oseltamivir (TAMIFLU) 75 MG capsule            Differential Diagnosis:  URI Adult/Peds:  Bronchitis-viral, Influenza, Pneumonia, Sinusitis and Viral upper respiratory illness    Serious Comorbid Conditions:  Adult:  None    PLAN:    URI Adult:  Fluids, Rest and RX  influenza  Tamiflu 75 mg bid x 5 days    Followup:    If not improving or if condition worsens, follow up with your Primary Care Provider    Patient Instructions       The Flu (Influenza)     The virus that causes the flu spreads through the air in droplets when someone who has the flu coughs, sneezes, laughs, or talks.   The flu (influenza) is an infection that affects your respiratory tract. This tract is made up of your mouth, nose, and lungs, and the passages between them. Unlike a cold, the flu can make you very ill. And it can lead to pneumonia, a serious lung infection. The flu can have serious complications and even cause death.  Who is at risk for the flu?  Anyone can get the flu. But you are more likely to become infected if you:    Have a weakened immune system    Work in a healthcare setting where you may be exposed to flu germs    Live or work with someone who has the flu    Haven t had an annual flu shot  How does the flu spread?  The flu is caused by a virus. The virus spreads through the air in droplets when someone who has the flu coughs, sneezes, laughs, or talks. You can become infected when you inhale these viruses directly. You can also become infected when you touch a surface on which the droplets have landed and then transfer the germs to your eyes, nose, or mouth. Touching used tissues, or sharing utensils, drinking glasses, or a toothbrush from an infected person can expose you to flu viruses, too.  What are the symptoms of the flu?  Flu symptoms tend to come on quickly and may last a few days to a few weeks. They include:    Fever usually higher than 100.4 F  (38 C) and chills    Sore throat and headache    Dry cough    Runny nose    Tiredness and weakness    Muscle aches  Who is at risk for flu complications?  For some people, the flu can be very serious. The risk for complications is greater for:    Children younger than age 5    Adults ages 65 and older    People with a chronic illness  such as diabetes or heart, kidney, or lung disease    People who live in a nursing home or long-term care facility   How is the flu treated?  The flu usually gets better after 7 days or so. In some cases, your healthcare provider may prescribe an antiviral medicine. This may help you get well a little sooner. For the medicine to help, you need to take it as soon as possible (ideally within 48 hours) after your symptoms start. If you develop pneumonia or other serious illness, you may need to stay in the hospital.  Easing flu symptoms    Drink lots of fluids such as water, juice, and warm soup. A good rule is to drink enough so that you urinate your normal amount.    Get plenty of rest.    Ask your healthcare provider what to take for fever and pain.    Call your provider if your fever is 100.4 F (38 C) or higher, or you become dizzy, lightheaded, or short of breath.  Taking steps to protect others    Wash your hands often, especially after coughing or sneezing. Or clean your hands with an alcohol-based hand  containing at least 60% alcohol.    Cough or sneeze into a tissue. Then throw the tissue away and wash your hands. If you don t have a tissue, cough and sneeze into your elbow.    Stay home until at least 24 hours after you no longer have a fever or chills. Be sure the fever isn t being hidden by fever-reducing medicine.    Don t share food, utensils, drinking glasses, or a toothbrush with others.    Ask your healthcare provider if others in your household should get antiviral medicine to help them avoid infection.  How can the flu be prevented?    One of the best ways to avoid the flu is to get a flu vaccine each year. The virus that causes the flu changes from year to year. For that reason, healthcare providers recommend getting the flu vaccine each year, as soon as it's available in your area. The vaccine is given as a shot. Your healthcare provider can tell you which vaccine is right for you. A nasal  spray is also available but is not recommended for the 9513-5707 flu season. The CDC says this is because the nasal spray did not seem to protect against the flu over the last several flu seasons. In the past, it was meant for people ages 2 to 49.    Wash your hands often. Frequent handwashing is a proven way to help prevent infection.    Carry an alcohol-based hand gel containing at least 60% alcohol. Use it when you can't use soap and water. Then wash your hands as soon as you can.    Avoid touching your eyes, nose, and mouth.    At home and work, clean phones, computer keyboards, and toys often with disinfectant wipes.    If possible, avoid close contact with others who have the flu or symptoms of the flu.  Handwashing tips  Handwashing is one of the best ways to prevent many common infections. If you are caring for or visiting someone with the flu, wash your hands each time you enter and leave the room. Follow these steps:    Use warm water and plenty of soap. Rub your hands together well.    Clean the whole hand, including under your nails, between your fingers, and up the wrists.    Wash for at least 15 seconds.    Rinse, letting the water run down your fingers, not up your wrists.    Dry your hands well. Use a paper towel to turn off the faucet and open the door.  Using alcohol-based hand   Alcohol-based hand  are also a good choice. Use them when you can't use soap and water. Follow these steps:    Squeeze about a tablespoon of gel into the palm of one hand.    Rub your hands together briskly, cleaning the backs of your hands, the palms, between your fingers, and up the wrists.    Rub until the gel is gone and your hands are completely dry.  Preventing the flu in healthcare settings  The flu is a special concern for people in hospitals and long-term care facilities. To help prevent the spread of flu, many hospitals and nursing homes take these steps:    Healthcare providers wash their hands  or use an alcohol-based hand  before and after treating each patient.    People with the flu have private rooms and bathrooms or share a room with someone with the same infection.    People who are at high risk for the flu but don't have it are encouraged to get the flu and pneumonia vaccines.    All healthcare workers are encouraged or required to get flu shots.   Date Last Reviewed: 12/1/2016 2000-2017 [employer]. [address] [place] [address], [place] [address]. All rights reserved. This information is not intended as a substitute for professional medical care. Always follow your healthcare professional's instructions.

## 2018-03-14 NOTE — PATIENT INSTRUCTIONS
The Flu (Influenza)     The virus that causes the flu spreads through the air in droplets when someone who has the flu coughs, sneezes, laughs, or talks.   The flu (influenza) is an infection that affects your respiratory tract. This tract is made up of your mouth, nose, and lungs, and the passages between them. Unlike a cold, the flu can make you very ill. And it can lead to pneumonia, a serious lung infection. The flu can have serious complications and even cause death.  Who is at risk for the flu?  Anyone can get the flu. But you are more likely to become infected if you:    Have a weakened immune system    Work in a healthcare setting where you may be exposed to flu germs    Live or work with someone who has the flu    Haven t had an annual flu shot  How does the flu spread?  The flu is caused by a virus. The virus spreads through the air in droplets when someone who has the flu coughs, sneezes, laughs, or talks. You can become infected when you inhale these viruses directly. You can also become infected when you touch a surface on which the droplets have landed and then transfer the germs to your eyes, nose, or mouth. Touching used tissues, or sharing utensils, drinking glasses, or a toothbrush from an infected person can expose you to flu viruses, too.  What are the symptoms of the flu?  Flu symptoms tend to come on quickly and may last a few days to a few weeks. They include:    Fever usually higher than 100.4 F  (38 C) and chills    Sore throat and headache    Dry cough    Runny nose    Tiredness and weakness    Muscle aches  Who is at risk for flu complications?  For some people, the flu can be very serious. The risk for complications is greater for:    Children younger than age 5    Adults ages 65 and older    People with a chronic illness such as diabetes or heart, kidney, or lung disease    People who live in a nursing home or long-term care facility   How is the flu treated?  The flu usually gets  better after 7 days or so. In some cases, your healthcare provider may prescribe an antiviral medicine. This may help you get well a little sooner. For the medicine to help, you need to take it as soon as possible (ideally within 48 hours) after your symptoms start. If you develop pneumonia or other serious illness, you may need to stay in the hospital.  Easing flu symptoms    Drink lots of fluids such as water, juice, and warm soup. A good rule is to drink enough so that you urinate your normal amount.    Get plenty of rest.    Ask your healthcare provider what to take for fever and pain.    Call your provider if your fever is 100.4 F (38 C) or higher, or you become dizzy, lightheaded, or short of breath.  Taking steps to protect others    Wash your hands often, especially after coughing or sneezing. Or clean your hands with an alcohol-based hand  containing at least 60% alcohol.    Cough or sneeze into a tissue. Then throw the tissue away and wash your hands. If you don t have a tissue, cough and sneeze into your elbow.    Stay home until at least 24 hours after you no longer have a fever or chills. Be sure the fever isn t being hidden by fever-reducing medicine.    Don t share food, utensils, drinking glasses, or a toothbrush with others.    Ask your healthcare provider if others in your household should get antiviral medicine to help them avoid infection.  How can the flu be prevented?    One of the best ways to avoid the flu is to get a flu vaccine each year. The virus that causes the flu changes from year to year. For that reason, healthcare providers recommend getting the flu vaccine each year, as soon as it's available in your area. The vaccine is given as a shot. Your healthcare provider can tell you which vaccine is right for you. A nasal spray is also available but is not recommended for the 9232-3163 flu season. The CDC says this is because the nasal spray did not seem to protect against the flu  over the last several flu seasons. In the past, it was meant for people ages 2 to 49.    Wash your hands often. Frequent handwashing is a proven way to help prevent infection.    Carry an alcohol-based hand gel containing at least 60% alcohol. Use it when you can't use soap and water. Then wash your hands as soon as you can.    Avoid touching your eyes, nose, and mouth.    At home and work, clean phones, computer keyboards, and toys often with disinfectant wipes.    If possible, avoid close contact with others who have the flu or symptoms of the flu.  Handwashing tips  Handwashing is one of the best ways to prevent many common infections. If you are caring for or visiting someone with the flu, wash your hands each time you enter and leave the room. Follow these steps:    Use warm water and plenty of soap. Rub your hands together well.    Clean the whole hand, including under your nails, between your fingers, and up the wrists.    Wash for at least 15 seconds.    Rinse, letting the water run down your fingers, not up your wrists.    Dry your hands well. Use a paper towel to turn off the faucet and open the door.  Using alcohol-based hand   Alcohol-based hand  are also a good choice. Use them when you can't use soap and water. Follow these steps:    Squeeze about a tablespoon of gel into the palm of one hand.    Rub your hands together briskly, cleaning the backs of your hands, the palms, between your fingers, and up the wrists.    Rub until the gel is gone and your hands are completely dry.  Preventing the flu in healthcare settings  The flu is a special concern for people in hospitals and long-term care facilities. To help prevent the spread of flu, many hospitals and nursing homes take these steps:    Healthcare providers wash their hands or use an alcohol-based hand  before and after treating each patient.    People with the flu have private rooms and bathrooms or share a room with someone  with the same infection.    People who are at high risk for the flu but don't have it are encouraged to get the flu and pneumonia vaccines.    All healthcare workers are encouraged or required to get flu shots.   Date Last Reviewed: 12/1/2016 2000-2017 [employer]. [address] [place] [address]. All rights reserved. This information is not intended as a substitute for professional medical care. Always follow your healthcare professional's instructions.

## 2018-03-14 NOTE — MR AVS SNAPSHOT
After Visit Summary   3/14/2018    Carlos A Crespo    MRN: [id number]           Patient Information     Date Of Birth          1965        Visit Information        Provider Department      3/14/2018 11:00 AM Beatrice Montero NP [facility]        Today's Diagnoses     Flu-like symptoms    -  1      Care Instructions      The Flu (Influenza)     The virus that causes the flu spreads through the air in droplets when someone who has the flu coughs, sneezes, laughs, or talks.   The flu (influenza) is an infection that affects your respiratory tract. This tract is made up of your mouth, nose, and lungs, and the passages between them. Unlike a cold, the flu can make you very ill. And it can lead to pneumonia, a serious lung infection. The flu can have serious complications and even cause death.  Who is at risk for the flu?  Anyone can get the flu. But you are more likely to become infected if you:    Have a weakened immune system    Work in a healthcare setting where you may be exposed to flu germs    Live or work with someone who has the flu    Haven t had an annual flu shot  How does the flu spread?  The flu is caused by a virus. The virus spreads through the air in droplets when someone who has the flu coughs, sneezes, laughs, or talks. You can become infected when you inhale these viruses directly. You can also become infected when you touch a surface on which the droplets have landed and then transfer the germs to your eyes, nose, or mouth. Touching used tissues, or sharing utensils, drinking glasses, or a toothbrush from an infected person can expose you to flu viruses, too.  What are the symptoms of the flu?  Flu symptoms tend to come on quickly and may last a few days to a few weeks. They include:    Fever usually higher than 100.4 F  (38 C) and chills    Sore throat and headache    Dry cough    Runny nose    Tiredness and weakness    Muscle aches  Who is at risk for flu  complications?  For some people, the flu can be very serious. The risk for complications is greater for:    Children younger than age 5    Adults ages 65 and older    People with a chronic illness such as diabetes or heart, kidney, or lung disease    People who live in a nursing home or long-term care facility   How is the flu treated?  The flu usually gets better after 7 days or so. In some cases, your healthcare provider may prescribe an antiviral medicine. This may help you get well a little sooner. For the medicine to help, you need to take it as soon as possible (ideally within 48 hours) after your symptoms start. If you develop pneumonia or other serious illness, you may need to stay in the hospital.  Easing flu symptoms    Drink lots of fluids such as water, juice, and warm soup. A good rule is to drink enough so that you urinate your normal amount.    Get plenty of rest.    Ask your healthcare provider what to take for fever and pain.    Call your provider if your fever is 100.4 F (38 C) or higher, or you become dizzy, lightheaded, or short of breath.  Taking steps to protect others    Wash your hands often, especially after coughing or sneezing. Or clean your hands with an alcohol-based hand  containing at least 60% alcohol.    Cough or sneeze into a tissue. Then throw the tissue away and wash your hands. If you don t have a tissue, cough and sneeze into your elbow.    Stay home until at least 24 hours after you no longer have a fever or chills. Be sure the fever isn t being hidden by fever-reducing medicine.    Don t share food, utensils, drinking glasses, or a toothbrush with others.    Ask your healthcare provider if others in your household should get antiviral medicine to help them avoid infection.  How can the flu be prevented?    One of the best ways to avoid the flu is to get a flu vaccine each year. The virus that causes the flu changes from year to year. For that reason, healthcare  providers recommend getting the flu vaccine each year, as soon as it's available in your area. The vaccine is given as a shot. Your healthcare provider can tell you which vaccine is right for you. A nasal spray is also available but is not recommended for the 8242-8697 flu season. The CDC says this is because the nasal spray did not seem to protect against the flu over the last several flu seasons. In the past, it was meant for people ages 2 to 49.    Wash your hands often. Frequent handwashing is a proven way to help prevent infection.    Carry an alcohol-based hand gel containing at least 60% alcohol. Use it when you can't use soap and water. Then wash your hands as soon as you can.    Avoid touching your eyes, nose, and mouth.    At home and work, clean phones, computer keyboards, and toys often with disinfectant wipes.    If possible, avoid close contact with others who have the flu or symptoms of the flu.  Handwashing tips  Handwashing is one of the best ways to prevent many common infections. If you are caring for or visiting someone with the flu, wash your hands each time you enter and leave the room. Follow these steps:    Use warm water and plenty of soap. Rub your hands together well.    Clean the whole hand, including under your nails, between your fingers, and up the wrists.    Wash for at least 15 seconds.    Rinse, letting the water run down your fingers, not up your wrists.    Dry your hands well. Use a paper towel to turn off the faucet and open the door.  Using alcohol-based hand   Alcohol-based hand  are also a good choice. Use them when you can't use soap and water. Follow these steps:    Squeeze about a tablespoon of gel into the palm of one hand.    Rub your hands together briskly, cleaning the backs of your hands, the palms, between your fingers, and up the wrists.    Rub until the gel is gone and your hands are completely dry.  Preventing the flu in healthcare settings  The flu  is a special concern for people in hospitals and long-term care facilities. To help prevent the spread of flu, many hospitals and nursing homes take these steps:    Healthcare providers wash their hands or use an alcohol-based hand  before and after treating each patient.    People with the flu have private rooms and bathrooms or share a room with someone with the same infection.    People who are at high risk for the flu but don't have it are encouraged to get the flu and pneumonia vaccines.    All healthcare workers are encouraged or required to get flu shots.   Date Last Reviewed: 12/1/2016 2000-2017 [employer] [address] [place] [address]. All rights reserved. This information is not intended as a substitute for professional medical care. Always follow your healthcare professional's instructions.                Follow-ups after your visit        Who to contact     If you have questions or need follow up information about today's clinic visit or your schedule please contact [facility] directly at [phone].  Normal or non-critical lab and imaging results will be communicated to you by [employer], letter or phone within 4 business days after the clinic has received the results. If you do not hear from us within 7 days, please contact the clinic through [employer] or phone. If you have a critical or abnormal lab result, we will notify you by phone as soon as possible.  Submit refill requests through [employer] or call your pharmacy and they will forward the refill request to us. Please allow 3 business days for your refill to be completed.          Additional Information About Your Visit        [employer] Information     [employer] gives you secure access to your electronic health record. If you see a primary care provider, you can also send messages to your care team and make appointments. If you have questions, please call your primary care clinic.  If you do not  have a primary care provider, please call [phone] and they will assist you.        Care EveryWhere ID     This is your Care EveryWhere ID. This could be used by other organizations to access your [place] medical records  [id number]        Your Vitals Were     Pulse Temperature Respirations Height Pulse Oximetry BMI (Body Mass Index)    65 97.6  F (36.4  C) (Tympanic) 16 6' (1.829 m) 99% 27.12 kg/m2       Blood Pressure from Last 3 Encounters:   03/14/18 136/88   12/22/17 130/90   11/22/17 (!) 141/97    Weight from Last 3 Encounters:   03/14/18 200 lb (90.7 kg)   12/22/17 207 lb (93.9 kg)   11/22/17 201 lb (91.2 kg)              We Performed the Following     Influenza A/B antigen          Today's Medication Changes          These changes are accurate as of 3/14/18 12:10 PM.  If you have any questions, ask your nurse or doctor.               Start taking these medicines.        Dose/Directions    oseltamivir 75 MG capsule   Commonly known as:  TAMIFLU   Used for:  Flu-like symptoms   Started by:  Beatrice Montero NP        Dose:  75 mg   Take 1 capsule (75 mg) by mouth 2 times daily for 5 days   Quantity:  10 capsule   Refills:  0            Where to get your medicines      These medications were sent to [facility]/pharmacy #[id number] - [place], [place] - [address]  [address] [facility] [facility] [address]     Phone:  [phone]     oseltamivir 75 MG capsule                Primary Care Provider Office Phone # Fax #    Frances Upton [phone] [address] [address]        Equal Access to Services     [facility] CYRUS : Hadfermín Hollis, wamoraima dean, qaaga kaalmairaj sanchez, abel jordan. So [facility] [phone].    ATENCIÓN: Si habla español, tiene a munoz disposición servicios gratuitos de asistencia lingüística. Llame al [phone].    We comply with applicable federal civil rights laws and [place] laws. We do not  discriminate on the basis of race, color, national origin, age, disability, sex, sexual orientation, or gender identity.            Thank you!     Thank you for choosing [facility]  for your care. Our goal is always to provide you with excellent care. Hearing back from our patients is one way we can continue to improve our services. Please take a few minutes to complete the written survey that you may receive in the mail after your visit with us. Thank you!             Your Updated Medication List - Protect others around you: Learn how to safely use, store and throw away your medicines at www.disposemymeds.org.          This list is accurate as of 3/14/18 12:10 PM.  Always use your most recent med list.                   Brand Name Dispense Instructions for use Diagnosis    albuterol 108 (90 BASE) MCG/ACT Inhaler    PROAIR HFA/PROVENTIL HFA/VENTOLIN HFA    1 Inhaler    Inhale 2 puffs into the lungs every 6 hours as needed for shortness of breath / dyspnea or wheezing    Acute bronchitis, unspecified organism       azithromycin 250 MG tablet    ZITHROMAX    6 tablet    Two tablets first day, then one tablet daily for four days.    Acute bronchitis, unspecified organism       oseltamivir 75 MG capsule    TAMIFLU    10 capsule    Take 1 capsule (75 mg) by mouth 2 times daily for 5 days    Flu-like symptoms       terbinafine 250 MG tablet    lamISIL    90 tablet    Take 1 tablet (250 mg) by mouth daily    Onychomycosis

## 2018-03-14 NOTE — LETTER
[facility]  [address]  [facility] [address]  Phone: [phone]    March 14, 2018        Carlos A Crespo  [address] [facility] [place] [address]          To whom it may concern:    RE: Carlos A Stiller    Patient was seen and treated today at our clinic and missed work.  Patient may return to work 3/15/2018  with no restrictions.    Please contact me for questions or concerns.      Sincerely,        Beatrice Montero NP

## 2018-10-24 ENCOUNTER — OFFICE VISIT (OUTPATIENT)
Dept: FAMILY MEDICINE | Facility: CLINIC | Age: 53
End: 2018-10-24
Payer: COMMERCIAL

## 2018-10-24 VITALS
OXYGEN SATURATION: 97 % | WEIGHT: 207 LBS | BODY MASS INDEX: 28.04 KG/M2 | TEMPERATURE: 98.2 F | DIASTOLIC BLOOD PRESSURE: 88 MMHG | HEIGHT: 72 IN | HEART RATE: 67 BPM | SYSTOLIC BLOOD PRESSURE: 126 MMHG

## 2018-10-24 DIAGNOSIS — Z12.11 SCREEN FOR COLON CANCER: ICD-10-CM

## 2018-10-24 DIAGNOSIS — Z00.00 ROUTINE GENERAL MEDICAL EXAMINATION AT A HEALTH CARE FACILITY: Primary | ICD-10-CM

## 2018-10-24 LAB
CHOLEST SERPL-MCNC: 228 MG/DL
GLUCOSE SERPL-MCNC: 108 MG/DL (ref 70–99)
HDLC SERPL-MCNC: 61 MG/DL
LDLC SERPL CALC-MCNC: 154 MG/DL
NONHDLC SERPL-MCNC: 167 MG/DL
PSA SERPL-ACNC: 0.78 UG/L (ref 0–4)
TRIGL SERPL-MCNC: 63 MG/DL

## 2018-10-24 PROCEDURE — 80061 LIPID PANEL: CPT | Performed by: FAMILY MEDICINE

## 2018-10-24 PROCEDURE — 36415 COLL VENOUS BLD VENIPUNCTURE: CPT | Performed by: FAMILY MEDICINE

## 2018-10-24 PROCEDURE — 82947 ASSAY GLUCOSE BLOOD QUANT: CPT | Performed by: FAMILY MEDICINE

## 2018-10-24 PROCEDURE — 99396 PREV VISIT EST AGE 40-64: CPT | Performed by: FAMILY MEDICINE

## 2018-10-24 PROCEDURE — G0103 PSA SCREENING: HCPCS | Performed by: FAMILY MEDICINE

## 2018-10-24 ASSESSMENT — PAIN SCALES - GENERAL: PAINLEVEL: NO PAIN (0)

## 2018-10-24 NOTE — PROGRESS NOTES
SUBJECTIVE:   CC: Carlos A Crespo is an 53 year old male who presents for preventative health visit.     Healthy Habits:    Do you get at least three servings of calcium containing foods daily (dairy, green leafy vegetables, etc.)? yes    Amount of exercise or daily activities, outside of work: 5-6 day(s) per week    Problems taking medications regularly No    Medication side effects: No    Have you had an eye exam in the past two years? yes    Do you see a dentist twice per year? yes    Do you have sleep apnea, excessive snoring or daytime drowsiness? yes           Today's PHQ-2 Score:   PHQ-2 ( 1999 [employer]) 10/24/2018 11/10/2017   Q1: Little interest or pleasure in doing things 0 0   Q2: Feeling down, depressed or hopeless 0 2   PHQ-2 Score 0 2   Q1: Little interest or pleasure in doing things - -   Q2: Feeling down, depressed or hopeless - -   PHQ-2 Score - -       Abuse: Current or Past(Physical, Sexual or Emotional)- No  Do you feel safe in your environment - Yes    Social History   Substance Use Topics     Smoking status: Never Smoker     Smokeless tobacco: Never Used     Alcohol use Yes      Comment: occassional - 1 to 2 times a year      If you drink alcohol do you typically have >3 drinks per day or >7 drinks per week? No                      Last PSA:   PSA   Date Value Ref Range Status   11/10/2017 0.74 0 - 4 ug/L Final     Comment:     Assay Method:  Chemiluminescence using Siemens Vista analyzer       Reviewed orders with patient. Reviewed health maintenance and updated orders accordingly - Yes  Labs reviewed in EPIC  BP Readings from Last 3 Encounters:   10/24/18 126/88   03/14/18 136/88   12/22/17 130/90    Wt Readings from Last 3 Encounters:   10/24/18 93.9 kg (207 lb)   03/14/18 90.7 kg (200 lb)   12/22/17 93.9 kg (207 lb)                    Reviewed and updated as needed this visit by clinical staff  Tobacco  Allergies  Meds         Reviewed and updated as needed this visit by Provider         Here today for routine checkup and biometric screening  Doing well.  Reports no interval health concerns.      ROS:  CONSTITUTIONAL: NEGATIVE for fever, chills, change in weight  INTEGUMENTARY/SKIN: NEGATIVE for worrisome rashes, moles or lesions  EYES: NEGATIVE for vision changes or irritation  ENT: NEGATIVE for ear, mouth and throat problems  RESP: NEGATIVE for significant cough or SOB  CV: NEGATIVE for chest pain, palpitations or peripheral edema  GI: NEGATIVE for nausea, abdominal pain, heartburn, or change in bowel habits   male: negative for dysuria, hematuria, decreased urinary stream, erectile dysfunction, urethral discharge  MUSCULOSKELETAL: NEGATIVE for significant arthralgias or myalgia  NEURO: NEGATIVE for weakness, dizziness or paresthesias  PSYCHIATRIC: NEGATIVE for changes in mood or affect    OBJECTIVE:   /88 (BP Location: Right arm, Patient Position: Chair, Cuff Size: Adult Large)  Pulse 67  Temp 98.2  F (36.8  C) (Oral)  Ht 1.829 m (6')  Wt 93.9 kg (207 lb)  SpO2 97%  BMI 28.07 kg/m2  EXAM:  GENERAL: healthy, alert and no distress  EYES: Eyes grossly normal to inspection, PERRL and conjunctivae and sclerae normal  HENT: ear canals and TM's normal, nose and mouth without ulcers or lesions  NECK: no adenopathy, no asymmetry, masses, or scars and thyroid normal to palpation  RESP: lungs clear to auscultation - no rales, rhonchi or wheezes  CV: regular rate and rhythm, normal S1 S2, no S3 or S4, no murmur, click or rub, no peripheral edema and peripheral pulses strong  ABDOMEN: soft, nontender, no hepatosplenomegaly, no masses and bowel sounds normal  MS: no gross musculoskeletal defects noted, no edema  SKIN: no suspicious lesions or rashes  NEURO: Normal strength and tone, mentation intact and speech normal  PSYCH: mentation appears normal, affect normal/bright    Diagnostic Test Results:  none     ASSESSMENT/PLAN:   1. Routine general medical examination at a [facility]  facility    - Lipid panel reflex to direct LDL Fasting  - Glucose    2. Screen for colon cancer  - Fecal colorectal cancer screen (FIT); Future      COUNSELING:  Reviewed preventive health counseling, as reflected in patient instructions       Regular exercise       Healthy diet/nutrition       Vision screening       Colon cancer screening       Prostate cancer screening    BP Readings from Last 1 Encounters:   10/24/18 126/88     Estimated body mass index is 28.07 kg/(m^2) as calculated from the following:    Height as of this encounter: 1.829 m (6').    Weight as of this encounter: 93.9 kg (207 lb).           reports that he has never smoked. He has never used smokeless tobacco.      Counseling Resources:  ATP IV Guidelines  Pooled Cohorts Equation Calculator  FRAX Risk Assessment  ICSI Preventive Guidelines  Dietary Guidelines for Americans, 2010  USDA's MyPlate  ASA Prophylaxis  Lung CA Screening    Frances Upton MD  [facility]

## 2018-10-24 NOTE — MR AVS SNAPSHOT
After Visit Summary   10/24/2018    Carlos A Crespo    MRN: [id number]           Patient Information     Date Of Birth          1965        Visit Information        Provider Department      10/24/2018 9:40 AM Frances Upton MD [facility]        Today's Diagnoses     Routine general medical examination at a health care facility    -  1    Screen for colon cancer          Care Instructions      Preventive Health Recommendations  Male Ages 50 - 64    Yearly exam:             See your health care provider every year in order to  o   Review health changes.   o   Discuss preventive care.    o   Review your medicines if your doctor has prescribed any.     Have a cholesterol test every 5 years, or more frequently if you are at risk for high cholesterol/heart disease.     Have a diabetes test (fasting glucose) every three years. If you are at risk for diabetes, you should have this test more often.     Have a colonoscopy at age 50, or have a yearly FIT test (stool test). These exams will check for colon cancer.      Talk with your health care provider about whether or not a prostate cancer screening test (PSA) is right for you.    You should be tested each year for STDs (sexually transmitted diseases), if you re at risk.     Shots: Get a flu shot each year. Get a tetanus shot every 10 years.     Nutrition:    Eat at least 5 servings of fruits and vegetables daily.     Eat whole-grain bread, whole-wheat pasta and brown rice instead of white grains and rice.     Get adequate Calcium and Vitamin D.     Lifestyle    Exercise for at least 150 minutes a week (30 minutes a day, 5 days a week). This will help you control your weight and prevent disease.     Limit alcohol to one drink per day.     No smoking.     Wear sunscreen to prevent skin cancer.     See your dentist every six months for an exam and cleaning.     See your eye doctor every 1 to 2 years.            Follow-ups after your  visit        Follow-up notes from your care team     Return in about 1 year (around 10/24/2019) for Annual Checkup.      Future tests that were ordered for you today     Open Future Orders        Priority Expected Expires Ordered    Fecal colorectal cancer screen (FIT) Routine 11/14/2018 1/16/2019 10/24/2018            Who to contact     If you have questions or need follow up information about today's clinic visit or your schedule please contact [facility] directly at [phone].  Normal or non-critical lab and imaging results will be communicated to you by WellFXhart, letter or phone within 4 business days after the clinic has received the results. If you do not hear from us within 7 days, please contact the clinic through [email or web address] or phone. If you have a critical or abnormal lab result, we will notify you by phone as soon as possible.  Submit refill requests through [email or web address] or call your pharmacy and they will forward the refill request to us. Please allow 3 business days for your refill to be completed.          Additional Information About Your Visit        WellFXhart Information     [email or web address] gives you secure access to your electronic health record. If you see a primary care provider, you can also send messages to your care team and make appointments. If you have questions, please call your primary care clinic.  If you do not have a primary care provider, please call [phone] and they will assist you.        Care EveryWhere ID     This is your Care EveryWhere ID. This could be used by other organizations to access your [place] medical records  [id number]        Your Vitals Were     Pulse Temperature Height Pulse Oximetry BMI (Body Mass Index)       67 98.2  F (36.8  C) (Oral) 1.829 m (6') 97% 28.07 kg/m2        Blood Pressure from Last 3 Encounters:   10/24/18 126/88   03/14/18 136/88   12/22/17 130/90    Weight from Last 3 Encounters:   10/24/18 93.9 kg (207 lb)   03/14/18 90.7 kg (200 lb)    12/22/17 93.9 kg (207 lb)              We Performed the Following     Glucose     Lipid panel reflex to direct LDL Fasting     Prostate spec antigen screen        Primary Care Provider Office Phone # Fax #    Frances Upton [phone] [address] [facility] [address]        Equal Access to Services     TASHIA BRIDGES : Hadii aad ku hadasho Soomaali, waaxda luqadaha, qaybta kaalmada adeegyada, waxay idiin hayaan adeeg kharash la'aan . So [facility] [phone].    ATENCIÓN: Si habla español, tiene a munoz disposición servicios gratuitos de asistencia lingüística. Llame al [phone].    We comply with applicable federal civil rights laws and [place] laws. We do not discriminate on the basis of race, color, national origin, age, disability, sex, sexual orientation, or gender identity.            Thank you!     Thank you for choosing [facility]  for your care. Our goal is always to provide you with excellent care. Hearing back from our patients is one way we can continue to improve our services. Please take a few minutes to complete the written survey that you may receive in the mail after your visit with us. Thank you!             Your Updated Medication List - Protect others around you: Learn how to safely use, store and throw away your medicines at www.disposemymeds.org.          This list is accurate as of 10/24/18 10:21 AM.  Always use your most recent med list.                   Brand Name Dispense Instructions for use Diagnosis    albuterol 108 (90 Base) MCG/ACT inhaler    PROAIR HFA/PROVENTIL HFA/VENTOLIN HFA    1 Inhaler    Inhale 2 puffs into the lungs every 6 hours as needed for shortness of breath / dyspnea or wheezing    Acute bronchitis, unspecified organism

## 2018-11-04 ENCOUNTER — OFFICE VISIT (OUTPATIENT)
Dept: URGENT CARE | Facility: URGENT CARE | Age: 53
End: 2018-11-04
Payer: COMMERCIAL

## 2018-11-04 VITALS
OXYGEN SATURATION: 99 % | WEIGHT: 205 LBS | DIASTOLIC BLOOD PRESSURE: 94 MMHG | BODY MASS INDEX: 27.8 KG/M2 | SYSTOLIC BLOOD PRESSURE: 154 MMHG | TEMPERATURE: 98.4 F | HEART RATE: 67 BPM

## 2018-11-04 DIAGNOSIS — J20.9 ACUTE BRONCHITIS, UNSPECIFIED ORGANISM: ICD-10-CM

## 2018-11-04 DIAGNOSIS — R07.0 THROAT PAIN: Primary | ICD-10-CM

## 2018-11-04 LAB
DEPRECATED S PYO AG THROAT QL EIA: NORMAL
SPECIMEN SOURCE: NORMAL

## 2018-11-04 PROCEDURE — 87880 STREP A ASSAY W/OPTIC: CPT | Performed by: INTERNAL MEDICINE

## 2018-11-04 PROCEDURE — 99214 OFFICE O/P EST MOD 30 MIN: CPT | Performed by: PHYSICIAN ASSISTANT

## 2018-11-04 PROCEDURE — 87081 CULTURE SCREEN ONLY: CPT | Performed by: PHYSICIAN ASSISTANT

## 2018-11-04 ASSESSMENT — ENCOUNTER SYMPTOMS
HEMATURIA: 0
GASTROINTESTINAL NEGATIVE: 1
SORE THROAT: 1
DIAPHORESIS: 0
CHEST TIGHTNESS: 0
NAUSEA: 0
NEUROLOGICAL NEGATIVE: 1
POLYDIPSIA: 0
VOMITING: 0
EYE DISCHARGE: 0
MUSCULOSKELETAL NEGATIVE: 1
SHORTNESS OF BREATH: 0
CARDIOVASCULAR NEGATIVE: 1
LIGHT-HEADEDNESS: 0
CHILLS: 0
MYALGIAS: 0
DIARRHEA: 0
ENDOCRINE NEGATIVE: 1
EYE ITCHING: 0
RHINORRHEA: 0
ADENOPATHY: 0
WHEEZING: 0
WEAKNESS: 0
EYE REDNESS: 0
FEVER: 0
HEADACHES: 0
ABDOMINAL PAIN: 0
PALPITATIONS: 0
EYES NEGATIVE: 1
DYSURIA: 0
COUGH: 1
CONSTITUTIONAL NEGATIVE: 1
DIZZINESS: 0
FREQUENCY: 0

## 2018-11-04 NOTE — PROGRESS NOTES
"SUBJECTIVE:   Carlos A Crespo is a 53 year old male presenting with a chief complaint of No chief complaint on file.      He is an established patient of [facility].    URI Adult    Onset of symptoms was {NUMBERS 1-12:10} {TIME UNITS:8358} ago.  Course of illness is {STATUS:241115}.    Severity {SEVERITY:299534::\"moderate\"}  Current and Associated symptoms: { URI COMPAINTS:702859}  Treatment measures tried include {URI TREATMENTS  TRIED:303435}.  Predisposing factors include {URI PRECIPITATING FACTORS:874118}.  ***      Review of Systems    Past Medical History:   Diagnosis Date     Allergic rhinitis, cause unspecified      Carpal tunnel syndrome     RIght wrist off and on.     Low back pain      Scoliosis of thoracic spine      Family History   Problem Relation Age of Onset     Hypertension Mother      Breast Cancer Mother      Diabetes Father      Hypertension Father      Diabetes Maternal Uncle      Cerebrovascular Disease No family hx of      Coronary Artery Disease No family hx of      Current Outpatient Prescriptions   Medication Sig Dispense Refill     albuterol (PROAIR HFA/PROVENTIL HFA/VENTOLIN HFA) 108 (90 BASE) MCG/ACT Inhaler Inhale 2 puffs into the lungs every 6 hours as needed for shortness of breath / dyspnea or wheezing 1 Inhaler 0     Social History   Substance Use Topics     Smoking status: Never Smoker     Smokeless tobacco: Never Used     Alcohol use Yes      Comment: occassional - 1 to 2 times a year       OBJECTIVE  There were no vitals taken for this visit.    Physical Exam  ***  Labs:  No results found for this or any previous visit (from the past 24 hour(s)).    {XRay was/was not done (Optional):789852}    ASSESSMENT:      ICD-10-CM    1. Throat pain R07.0 Strep, Rapid Screen        Medical Decision Making:    Differential Diagnosis:  { Differential Choices:282650}    Serious Comorbid Conditions:  { Serious Comorbid Conditions:512665}    PLAN:    { Plan " "Choices:581163}    Followup:    { Followup:760200::\"If not improving or if condition worsens, follow up with your Primary Care Provider\"}    There are no Patient Instructions on file for this visit.      "

## 2018-11-04 NOTE — MR AVS SNAPSHOT
After Visit Summary   11/4/2018    Carlos A Crespo    MRN: [id number]           Patient Information     Date Of Birth          1965        Visit Information        Provider Department      11/4/2018 10:10 AM Ryan Fatima PA-C [facility]        Today's Diagnoses     Throat pain    -  1    Acute bronchitis, unspecified organism          Care Instructions      Viral Bronchitis (Adult)    You have a viral bronchitis. Bronchitis is inflammation and swelling of the lining of the lungs. This is often caused by an infection. Symptoms include a dry, hacking cough that is worse at night. The cough may bring up yellow-green mucus. You may also feel short of breath or wheeze. Other symptoms may include tiredness, chest discomfort, and chills.  Bronchitis that is caused by a virus is not treated with antibiotics. Instead, medicines may be given to help relieve symptoms. Symptoms can last up to 2 weeks, although the cough may last much longer.  This illness is contagious during the first few days and is spread through the air by coughing and sneezing, or by direct contact (touching the sick person and then touching your own eyes, nose, or mouth).  Most viral illnesses resolve within 10 to 14 days with rest and simple home remedies, although they may sometimes last for several weeks.  Home care    If symptoms are severe, rest at home for the first 2 to 3 days. When you go back to your usual activities, don't let yourself get too tired.    Do not smoke. Also avoid being exposed to secondhand smoke.    You may use over-the-counter medicine to control fever or pain, unless another pain medicine was prescribed. If you have chronic liver or kidney disease or have ever had a stomach ulcer or gastrointestinal bleeding, talk with your healthcare provider before using these medicines. Also talk to your provider if you are taking medicine to prevent blood clots. Aspirin should never be given to  anyone younger than 18 years of age who is ill with a viral infection or fever. It may cause severe liver or brain damage.    Your appetite may be poor, so a light diet is fine. Avoid dehydration by drinking 6 to 8 glasses of fluids per day (such as water, soft drinks, sports drinks, juices, tea, or soup). Extra fluids will help loosen secretions in the nose and lungs.    Over-the-counter cough, cold, and sore-throat medicines will not shorten the length of the illness, but they may help to reduce symptoms. Don't use decongestants if you have high blood pressure.  Follow-up care  Follow up with your healthcare provider, or as advised. If you had an X-ray or ECG (electrocardiogram), a specialist will review it. You will be notified of any new findings that may affect your care.  If you are age 65 or older, or if you have a chronic lung disease or condition that affects your immune system, or you smoke, ask your healthcare provider about getting a pneumococcal vaccine and a yearly flu shot (influenza vaccine).  When to seek medical advice  Call your healthcare provider right away if any of these occur:    Fever of 100.4 F (38 C) or higher, or as directed by your healthcare provider    Coughing up increased amounts of colored sputum    Weakness, drowsiness, headache, facial pain, ear pain, or a stiff neck  Call 911  Call 911 if any of these occur:    Coughing up blood    Worsening weakness, drowsiness, headache, or stiff neck    Trouble breathing, wheezing, or pain with breathing  Date Last Reviewed: 9/13/2015 2000-2017 [employer]. [address] [place] [address]. All rights reserved. This information is not intended as a substitute for professional medical care. Always follow your healthcare professional's instructions.                Follow-ups after your visit        Who to contact     If you have questions or need follow up information about today's clinic visit or your schedule please  contact [facility] [place] directly at [phone].  Normal or non-critical lab and imaging results will be communicated to you by MyChart, letter or phone within 4 business days after the clinic has received the results. If you do not hear from us within 7 days, please contact the clinic through MyChart or phone. If you have a critical or abnormal lab result, we will notify you by phone as soon as possible.  Submit refill requests through [employer] or call your pharmacy and they will forward the refill request to us. Please allow 3 business days for your refill to be completed.          Additional Information About Your Visit        LogLogicharRentablesÂ® Information     [employer] Systems gives you secure access to your electronic health record. If you see a primary care provider, you can also send messages to your care team and make appointments. If you have questions, please call your primary care clinic.  If you do not have a primary care provider, please call [phone] and they will assist you.        Care EveryWhere ID     This is your Care EveryWhere ID. This could be used by other organizations to access your [place] medical records  [id number]        Your Vitals Were     Pulse Temperature Pulse Oximetry BMI (Body Mass Index)          67 98.4  F (36.9  C) (Oral) 99% 27.8 kg/m2         Blood Pressure from Last 3 Encounters:   11/04/18 (!) 154/94   10/24/18 126/88   03/14/18 136/88    Weight from Last 3 Encounters:   11/04/18 205 lb (93 kg)   10/24/18 207 lb (93.9 kg)   03/14/18 200 lb (90.7 kg)              We Performed the Following     Strep, Rapid Screen        Primary Care Provider Office Phone # Fax #    Frances Raji Upton [phone] [address] [facility] [address]        Equal Access to Services     SHANNAN BRIDGES : Olvin Hollis, wamroaima dean, qaybta kaabel moeller. So [facility] [phone].    ATENCIÓN: Si  shani deutsch, tiene a munoz disposición servicios gratuitos de asistencia lingüística. Bernabe gonzales [phone].    We comply with applicable federal civil rights laws and [place] laws. We do not discriminate on the basis of race, color, national origin, age, disability, sex, sexual orientation, or gender identity.            Thank you!     Thank you for choosing [facility]  for your care. Our goal is always to provide you with excellent care. Hearing back from our patients is one way we can continue to improve our services. Please take a few minutes to complete the written survey that you may receive in the mail after your visit with us. Thank you!             Your Updated Medication List - Protect others around you: Learn how to safely use, store and throw away your medicines at www.disposemymeds.org.          This list is accurate as of 11/4/18 10:53 AM.  Always use your most recent med list.                   Brand Name Dispense Instructions for use Diagnosis    albuterol 108 (90 Base) MCG/ACT inhaler    PROAIR HFA/PROVENTIL HFA/VENTOLIN HFA    1 Inhaler    Inhale 2 puffs into the lungs every 6 hours as needed for shortness of breath / dyspnea or wheezing    Acute bronchitis, unspecified organism

## 2018-11-04 NOTE — PROGRESS NOTES
Chief Complaint:     Chief Complaint   Patient presents with     URI     cough, sore throat x's 2 weeks       HPI: Carlos A Crespo is an 53 year old male who presents with dry cough and sore throat. It began  2 week(s) ago and has unchanged.  Cough is occasional There is no shortness of breath, wheezing and chest pain.      Recent travel?  no.      ROS:     Review of Systems   Constitutional: Negative.  Negative for chills, diaphoresis and fever.   HENT: Positive for sore throat. Negative for congestion, ear pain and rhinorrhea.    Eyes: Negative.  Negative for discharge, redness and itching.   Respiratory: Positive for cough. Negative for chest tightness, shortness of breath and wheezing.    Cardiovascular: Negative.  Negative for chest pain and palpitations.   Gastrointestinal: Negative.  Negative for abdominal pain, diarrhea, nausea and vomiting.   Endocrine: Negative.  Negative for polydipsia and polyuria.   Genitourinary: Negative for dysuria, frequency, hematuria and urgency.   Musculoskeletal: Negative.  Negative for myalgias.   Skin: Negative for rash.   Allergic/Immunologic: Negative for immunocompromised state.   Neurological: Negative.  Negative for dizziness, weakness, light-headedness and headaches.   Hematological: Negative for adenopathy.        Respiratory History  occasional episodes of bronchitis       Family History   Family History   Problem Relation Age of Onset     Hypertension Mother      Breast Cancer Mother      Diabetes Father      Hypertension Father      Pulmonary Embolism Father      Diabetes Maternal Uncle      Cerebrovascular Disease No family hx of      Coronary Artery Disease No family hx of         Problem history  Patient Active Problem List   Diagnosis     Scoliosis of thoracic spine     Hyperlipidemia LDL goal <100     Onychomycosis     Elevated blood-pressure reading without diagnosis of hypertension        Allergies  Allergies   Allergen Reactions     Influenza Vac Split [Flu  Virus Vaccine]      Tetracycline      Ace Inhibitors Cough     Unsure as to which medication was used but probably Lisinopril. Does remember having a cough with one of the medications in the past.        Social History  Social History     Social History     Marital status:      Spouse name: Tana     Number of children: 0     Years of education: N/A     Occupational History     Warehouse work. [employer]     Social History Main Topics     Smoking status: Never Smoker     Smokeless tobacco: Never Used     Alcohol use Yes      Comment: occassional - 1 to 2 times a year     Drug use: No     Sexual activity: Yes     Partners: Female     Other Topics Concern      Service No     Blood Transfusions No     Caffeine Concern No     Pop - 2 sodas a day     Occupational Exposure Yes     , chemical and enviromental     Hobby Hazards No     Sleep Concern No     Stress Concern No     Weight Concern Yes     Recent rapid loss of weight.     Special Diet No     Back Care Yes     PT for ankle in past     Exercise Yes     Active at work Walks his dog 2 times at work     Bike Helmet No     Seat Belt Yes     Self-Exams No     Parent/Sibling W/ Cabg, Mi Or Angioplasty Before 65f 55m? No     Social History Narrative        Current Meds    Current Outpatient Prescriptions:      albuterol (PROAIR HFA/PROVENTIL HFA/VENTOLIN HFA) 108 (90 BASE) MCG/ACT Inhaler, Inhale 2 puffs into the lungs every 6 hours as needed for shortness of breath / dyspnea or wheezing, Disp: 1 Inhaler, Rfl: 0        OBJECTIVE     Vital signs reviewed by Ryan Fatima  BP (!) 154/94  Pulse 67  Temp 98.4  F (36.9  C) (Oral)  Wt 205 lb (93 kg)  SpO2 99%  BMI 27.8 kg/m2     Physical Exam   Constitutional: He appears well-developed and well-nourished. No distress.   HENT:   Head: Normocephalic and atraumatic.   Right Ear: Tympanic membrane and external ear normal. Tympanic membrane is not erythematous, not retracted and not  bulging.   Left Ear: Tympanic membrane and external ear normal. Tympanic membrane is not erythematous, not retracted and not bulging.   Mouth/Throat: Posterior oropharyngeal erythema present. No oropharyngeal exudate, posterior oropharyngeal edema or tonsillar abscesses. Tonsils are 0 on the right. Tonsils are 0 on the left. No tonsillar exudate.   Eyes: EOM are normal. Pupils are equal, round, and reactive to light.   Neck: Normal range of motion. Neck supple.   Cardiovascular: Normal rate, regular rhythm, normal heart sounds and intact distal pulses.  Exam reveals no gallop and no friction rub.    No murmur heard.  Pulmonary/Chest: Effort normal and breath sounds normal. No respiratory distress.   Abdominal: Soft. Bowel sounds are normal. He exhibits no distension. There is no tenderness.   Lymphadenopathy:     He has no cervical adenopathy.   Neurological: He is alert. No cranial nerve deficit.   Skin: Skin is warm and dry. No rash noted. He is not diaphoretic.   Psychiatric: He has a normal mood and affect.   Nursing note and vitals reviewed.        Labs:     Results for orders placed or performed in visit on 11/04/18   Strep, Rapid Screen   Result Value Ref Range    Specimen Description Throat     Rapid Strep A Screen       NEGATIVE: No Group A streptococcal antigen detected by immunoassay, await culture report.       Medical Decision Making:    Differential Diagnosis:  URI Adult/Peds:  Bronchitis-viral, Influenza, Strep pharyngitis, Viral pharyngitis, Viral syndrome and Viral upper respiratory illness        ASSESSMENT    1. Throat pain    2. Acute bronchitis, unspecified organism        PLAN  Patient presents for 2 weeks of cough and sore throat.  He is in no acute distress.  He is afebrile with clear lung sounds and O2 sats at 98% on RA. Imaging is not indicated.  RST was negative.  We will call with culture results only if positive.  Rest, Push fluids, vaporizer, elevation of head of bed.  Ibuprofen and or  Tylenol for any fever or body aches.  Over the counter cough suppressant- PRN- as discussed.   If symptoms worsen, recheck immediately otherwise follow up with your PCP in 1 week if symptoms are not improving.  Worrisome symptoms discussed with instructions to go to the ED.  Patient verbalized understanding and agreed with this plan.         Ryan Fatima  11/4/2018, 10:31 AM

## 2018-11-04 NOTE — PATIENT INSTRUCTIONS
Viral Bronchitis (Adult)    You have a viral bronchitis. Bronchitis is inflammation and swelling of the lining of the lungs. This is often caused by an infection. Symptoms include a dry, hacking cough that is worse at night. The cough may bring up yellow-green mucus. You may also feel short of breath or wheeze. Other symptoms may include tiredness, chest discomfort, and chills.  Bronchitis that is caused by a virus is not treated with antibiotics. Instead, medicines may be given to help relieve symptoms. Symptoms can last up to 2 weeks, although the cough may last much longer.  This illness is contagious during the first few days and is spread through the air by coughing and sneezing, or by direct contact (touching the sick person and then touching your own eyes, nose, or mouth).  Most viral illnesses resolve within 10 to 14 days with rest and simple home remedies, although they may sometimes last for several weeks.  Home care    If symptoms are severe, rest at home for the first 2 to 3 days. When you go back to your usual activities, don't let yourself get too tired.    Do not smoke. Also avoid being exposed to secondhand smoke.    You may use over-the-counter medicine to control fever or pain, unless another pain medicine was prescribed. If you have chronic liver or kidney disease or have ever had a stomach ulcer or gastrointestinal bleeding, talk with your healthcare provider before using these medicines. Also talk to your provider if you are taking medicine to prevent blood clots. Aspirin should never be given to anyone younger than 18 years of age who is ill with a viral infection or fever. It may cause severe liver or brain damage.    Your appetite may be poor, so a light diet is fine. Avoid dehydration by drinking 6 to 8 glasses of fluids per day (such as water, soft drinks, sports drinks, juices, tea, or soup). Extra fluids will help loosen secretions in the nose and lungs.    Over-the-counter cough, cold,  and sore-throat medicines will not shorten the length of the illness, but they may help to reduce symptoms. Don't use decongestants if you have high blood pressure.  Follow-up care  Follow up with your healthcare provider, or as advised. If you had an X-ray or ECG (electrocardiogram), a specialist will review it. You will be notified of any new findings that may affect your care.  If you are age 65 or older, or if you have a chronic lung disease or condition that affects your immune system, or you smoke, ask your healthcare provider about getting a pneumococcal vaccine and a yearly flu shot (influenza vaccine).  When to seek medical advice  Call your healthcare provider right away if any of these occur:    Fever of 100.4 F (38 C) or higher, or as directed by your healthcare provider    Coughing up increased amounts of colored sputum    Weakness, drowsiness, headache, facial pain, ear pain, or a stiff neck  Call 911  Call 911 if any of these occur:    Coughing up blood    Worsening weakness, drowsiness, headache, or stiff neck    Trouble breathing, wheezing, or pain with breathing  Date Last Reviewed: 9/13/2015 2000-2017 [employer]. [address] [place] [address], [place] [address]. All rights reserved. This information is not intended as a substitute for professional medical care. Always follow your healthcare professional's instructions.

## 2018-11-05 LAB
BACTERIA SPEC CULT: NORMAL
SPECIMEN SOURCE: NORMAL

## 2019-11-05 ENCOUNTER — TELEPHONE (OUTPATIENT)
Dept: FAMILY MEDICINE | Facility: CLINIC | Age: 54
End: 2019-11-05

## 2019-11-05 ENCOUNTER — OFFICE VISIT (OUTPATIENT)
Dept: FAMILY MEDICINE | Facility: CLINIC | Age: 54
End: 2019-11-05
Payer: COMMERCIAL

## 2019-11-05 VITALS
RESPIRATION RATE: 18 BRPM | HEIGHT: 71 IN | OXYGEN SATURATION: 99 % | BODY MASS INDEX: 29.82 KG/M2 | SYSTOLIC BLOOD PRESSURE: 162 MMHG | WEIGHT: 213 LBS | TEMPERATURE: 97.7 F | DIASTOLIC BLOOD PRESSURE: 102 MMHG | HEART RATE: 62 BPM

## 2019-11-05 DIAGNOSIS — Z13.220 SCREENING FOR HYPERLIPIDEMIA: ICD-10-CM

## 2019-11-05 DIAGNOSIS — R03.0 ELEVATED BLOOD PRESSURE READING WITHOUT DIAGNOSIS OF HYPERTENSION: ICD-10-CM

## 2019-11-05 DIAGNOSIS — Z12.5 SCREENING FOR PROSTATE CANCER: ICD-10-CM

## 2019-11-05 DIAGNOSIS — Z00.00 ROUTINE GENERAL MEDICAL EXAMINATION AT A HEALTH CARE FACILITY: Primary | ICD-10-CM

## 2019-11-05 DIAGNOSIS — Z13.1 SCREENING FOR DIABETES MELLITUS: ICD-10-CM

## 2019-11-05 DIAGNOSIS — Z12.11 SCREEN FOR COLON CANCER: ICD-10-CM

## 2019-11-05 LAB
ALBUMIN SERPL-MCNC: 3.9 G/DL (ref 3.4–5)
ALP SERPL-CCNC: 79 U/L (ref 40–150)
ALT SERPL W P-5'-P-CCNC: 28 U/L (ref 0–70)
ANION GAP SERPL CALCULATED.3IONS-SCNC: 6 MMOL/L (ref 3–14)
AST SERPL W P-5'-P-CCNC: 19 U/L (ref 0–45)
BILIRUB SERPL-MCNC: 0.4 MG/DL (ref 0.2–1.3)
BUN SERPL-MCNC: 16 MG/DL (ref 7–30)
CALCIUM SERPL-MCNC: 9 MG/DL (ref 8.5–10.1)
CHLORIDE SERPL-SCNC: 106 MMOL/L (ref 94–109)
CHOLEST SERPL-MCNC: 210 MG/DL
CO2 SERPL-SCNC: 26 MMOL/L (ref 20–32)
CREAT SERPL-MCNC: 0.84 MG/DL (ref 0.66–1.25)
CREAT UR-MCNC: 109 MG/DL
GFR SERPL CREATININE-BSD FRML MDRD: >90 ML/MIN/{1.73_M2}
GLUCOSE SERPL-MCNC: 99 MG/DL (ref 70–99)
HDLC SERPL-MCNC: 58 MG/DL
LDLC SERPL CALC-MCNC: 139 MG/DL
MICROALBUMIN UR-MCNC: 6 MG/L
MICROALBUMIN/CREAT UR: 5.67 MG/G CR (ref 0–17)
NONHDLC SERPL-MCNC: 152 MG/DL
POTASSIUM SERPL-SCNC: 4 MMOL/L (ref 3.4–5.3)
PROT SERPL-MCNC: 7.5 G/DL (ref 6.8–8.8)
PSA SERPL-ACNC: 0.85 UG/L (ref 0–4)
SODIUM SERPL-SCNC: 138 MMOL/L (ref 133–144)
TRIGL SERPL-MCNC: 63 MG/DL

## 2019-11-05 PROCEDURE — 80061 LIPID PANEL: CPT | Performed by: PHYSICIAN ASSISTANT

## 2019-11-05 PROCEDURE — 99396 PREV VISIT EST AGE 40-64: CPT | Performed by: PHYSICIAN ASSISTANT

## 2019-11-05 PROCEDURE — 36415 COLL VENOUS BLD VENIPUNCTURE: CPT | Performed by: PHYSICIAN ASSISTANT

## 2019-11-05 PROCEDURE — 80053 COMPREHEN METABOLIC PANEL: CPT | Performed by: PHYSICIAN ASSISTANT

## 2019-11-05 PROCEDURE — 82043 UR ALBUMIN QUANTITATIVE: CPT | Performed by: PHYSICIAN ASSISTANT

## 2019-11-05 PROCEDURE — G0103 PSA SCREENING: HCPCS | Performed by: PHYSICIAN ASSISTANT

## 2019-11-05 ASSESSMENT — MIFFLIN-ST. JEOR: SCORE: 1838.29

## 2019-11-05 ASSESSMENT — PAIN SCALES - GENERAL: PAINLEVEL: NO PAIN (0)

## 2019-11-05 NOTE — RESULT ENCOUNTER NOTE
Octavio Strauss  Your cholesterol was a bit high. Poor diet, genetics and being overweight can contribute to this. Maintaining a healthy diet with lean proteins, whole grains and healthy fats such as olive oil as well as regular exercise and maintaining an appropriate weight all contribute to healthier cholesterol levels.    I do not think you need cholesterol lowering medication at this time but perhaps we should consider discussing it at your next appointment.   Your electrolytes, blood sugar, kidney function and liver function were normal.    Your prostate cancer screening test was negative.   Your urine test looking for protein is not back yet.   Please call or MyChart my office with any questions or concerns.    Mariely Steele, PAC

## 2019-11-05 NOTE — TELEPHONE ENCOUNTER
Patient brought in biometric screening form    Form completed, faxed, made copy for abstraction and original mailed to patient.

## 2019-11-05 NOTE — PROGRESS NOTES
3  SUBJECTIVE:   CC: Carlos A Crespo is an 54 year old male who presents for preventive health visit.     Healthy Habits:    Do you get at least three servings of calcium containing foods daily (dairy, green leafy vegetables, etc.)? yes    Amount of exercise or daily activities, outside of work: walking the dog    Problems taking medications regularly No    Medication side effects: No    Have you had an eye exam in the past two years? yes    Do you see a dentist twice per year? yes    Do you have sleep apnea, excessive snoring or daytime drowsiness?No          Today's PHQ-2 Score:   PHQ-2 ( 1999 [employer]) 11/5/2019 11/5/2019   Q1: Little interest or pleasure in doing things 0 0   Q2: Feeling down, depressed or hopeless 0 0   PHQ-2 Score 0 0   Q1: Little interest or pleasure in doing things - -   Q2: Feeling down, depressed or hopeless - -   PHQ-2 Score - -       Abuse: Current or Past(Physical, Sexual or Emotional)- No  Do you feel safe in your environment? Yes        Social History     Tobacco Use     Smoking status: Never Smoker     Smokeless tobacco: Never Used   Substance Use Topics     Alcohol use: Yes     Comment: occassional - 1 to 2 times a year     If you drink alcohol do you typically have >3 drinks per day or >7 drinks per week? No                      Last PSA:   PSA   Date Value Ref Range Status   10/24/2018 0.78 0 - 4 ug/L Final     Comment:     Assay Method:  Chemiluminescence using Siemens Vista analyzer       Reviewed orders with patient. Reviewed health maintenance and updated orders accordingly - Yes  BP Readings from Last 3 Encounters:   11/05/19 (!) 162/102   11/04/18 (!) 154/94   10/24/18 126/88    Wt Readings from Last 3 Encounters:   11/05/19 96.6 kg (213 lb)   11/04/18 93 kg (205 lb)   10/24/18 93.9 kg (207 lb)                  Patient Active Problem List   Diagnosis     Scoliosis of thoracic spine     Hyperlipidemia LDL goal <100     Onychomycosis     Elevated blood-pressure reading  "without diagnosis of hypertension     Past Surgical History:   Procedure Laterality Date     NO HISTORY OF SURGERY         Social History     Tobacco Use     Smoking status: Never Smoker     Smokeless tobacco: Never Used   Substance Use Topics     Alcohol use: Yes     Comment: occassional - 1 to 2 times a year     Family History   Problem Relation Age of Onset     Hypertension Mother      Breast Cancer Mother      Diabetes Father      Hypertension Father      Pulmonary Embolism Father      Diabetes Maternal Uncle      Cerebrovascular Disease No family hx of      Coronary Artery Disease No family hx of          Current Outpatient Medications   Medication Sig Dispense Refill     albuterol (PROAIR HFA/PROVENTIL HFA/VENTOLIN HFA) 108 (90 BASE) MCG/ACT Inhaler Inhale 2 puffs into the lungs every 6 hours as needed for shortness of breath / dyspnea or wheezing 1 Inhaler 0       Reviewed and updated as needed this visit by clinical staff  Tobacco  Allergies  Meds  Med Hx  Surg Hx  Fam Hx  Soc Hx        Reviewed and updated as needed this visit by Provider  Tobacco  Allergies  Meds  Problems  Med Hx  Surg Hx  Fam Hx          \"Eating a lot of chips\".  \"Doesn't like salt\".  \"Took off med years ago because weight was high and thought attributing to blood pressure and lost weight and blood pressure improved\".   Using albuterol as needed - if feel like not breathing I should. Took last Sunday  Using albuterol once a week.  Always have congestion in my throat - not sure what that is from  Doesn't want anything more than physical performed today     ROS:  CONSTITUTIONAL:has had weight gain  INTEGUMENTARY/SKIN: NEGATIVE for worrisome rashes, moles or lesions  EYES: NEGATIVE for vision changes or irritation  ENT: NEGATIVE for ear, mouth and throat problems  RESP: NEGATIVE for significant cough or SOB  CV: NEGATIVE for chest pain, palpitations or peripheral edema  GI: NEGATIVE for nausea, abdominal pain, heartburn, or " "change in bowel habits   male: negative for dysuria, hematuria, decreased urinary stream, erectile dysfunction, urethral discharge  MUSCULOSKELETAL: NEGATIVE for significant arthralgias or myalgia  NEURO: NEGATIVE for weakness, dizziness or paresthesias  PSYCHIATRIC: NEGATIVE for changes in mood or affect    OBJECTIVE:   BP (!) 162/102   Pulse 62   Temp 97.7  F (36.5  C) (Oral)   Resp 18   Ht 1.803 m (5' 11\")   Wt 96.6 kg (213 lb)   SpO2 99%   BMI 29.71 kg/m    EXAM:  GENERAL: healthy, alert and no distress  EYES: Eyes grossly normal to inspection, PERRL and conjunctivae and sclerae normal  HENT: ear canals and TM's normal, nose and mouth without ulcers or lesions  NECK: no adenopathy, no asymmetry, masses, or scars and thyroid normal to palpation  RESP: lungs clear to auscultation - no rales, rhonchi or wheezes  CV: regular rate and rhythm, normal S1 S2, no S3 or S4, no murmur, click or rub, no peripheral edema and peripheral pulses strong  ABDOMEN: soft, nontender, no hepatosplenomegaly, no masses and bowel sounds normal   (male): normal male genitalia without lesions or urethral discharge, no hernia  RECTAL: normal sphincter tone, no rectal masses, prostate normal size, smooth, nontender without nodules or masses  MS: no gross musculoskeletal defects noted, no edema  SKIN: no suspicious lesions or rashes  NEURO: Normal strength and tone, mentation intact and speech normal  PSYCH: mentation appears normal, affect normal/bright    Diagnostic Test Results:  Results for orders placed or performed in visit on 11/05/19   Comprehensive metabolic panel     Status: None   Result Value Ref Range    Sodium 138 133 - 144 mmol/L    Potassium 4.0 3.4 - 5.3 mmol/L    Chloride 106 94 - 109 mmol/L    Carbon Dioxide 26 20 - 32 mmol/L    Anion Gap 6 3 - 14 mmol/L    Glucose 99 70 - 99 mg/dL    Urea Nitrogen 16 7 - 30 mg/dL    Creatinine 0.84 0.66 - 1.25 mg/dL    GFR Estimate >90 >60 mL/min/[1.73_m2]    GFR Estimate If " Black >90 >60 mL/min/[1.73_m2]    Calcium 9.0 8.5 - 10.1 mg/dL    Bilirubin Total 0.4 0.2 - 1.3 mg/dL    Albumin 3.9 3.4 - 5.0 g/dL    Protein Total 7.5 6.8 - 8.8 g/dL    Alkaline Phosphatase 79 40 - 150 U/L    ALT 28 0 - 70 U/L    AST 19 0 - 45 U/L   Lipid panel reflex to direct LDL Fasting     Status: Abnormal   Result Value Ref Range    Cholesterol 210 (H) <200 mg/dL    Triglycerides 63 <150 mg/dL    HDL Cholesterol 58 >39 mg/dL    LDL Cholesterol Calculated 139 (H) <100 mg/dL    Non HDL Cholesterol 152 (H) <130 mg/dL   Albumin Random Urine Quantitative with Creat Ratio     Status: None   Result Value Ref Range    Creatinine Urine 109 mg/dL    Albumin Urine mg/L 6 mg/L    Albumin Urine mg/g Cr 5.67 0 - 17 mg/g Cr   PSA, screen     Status: None   Result Value Ref Range    PSA 0.85 0 - 4 ug/L       ASSESSMENT/PLAN:   1. Routine general medical examination at a health care facility    - Fecal colorectal cancer screen FIT - Future (S+30); Future  - Comprehensive metabolic panel  - Lipid panel reflex to direct LDL Fasting  - Albumin Random Urine Quantitative with Creat Ratio  - PSA, screen    2. Elevated blood pressure reading without diagnosis of hypertension  He declines treatment at this time- wants to work on diet and trial of weight loss - given information on dash diet   - Comprehensive metabolic panel    3. Screen for colon cancer  Encouraged to bring in FIT test  - Fecal colorectal cancer screen FIT - Future (S+30); Future    4. Screening for hyperlipidemia    - Comprehensive metabolic panel  - Lipid panel reflex to direct LDL Fasting    5. Screening for diabetes mellitus    - Comprehensive metabolic panel    6. Screening for prostate cancer    - PSA, screen    COUNSELING:  Reviewed preventive health counseling, as reflected in patient instructions       Regular exercise       Healthy diet/nutrition       Vision screening       Colon cancer screening       Prostate cancer screening    Estimated body mass index  "is 29.71 kg/m  as calculated from the following:    Height as of this encounter: 1.803 m (5' 11\").    Weight as of this encounter: 96.6 kg (213 lb).    Weight management plan: Discussed healthy diet and exercise guidelines     reports that he has never smoked. He has never used smokeless tobacco.      Counseling Resources:  ATP IV Guidelines  Pooled Cohorts Equation Calculator  FRAX Risk Assessment  ICSI Preventive Guidelines  Dietary Guidelines for Americans, 2010  USDA's MyPlate  ASA Prophylaxis  Lung CA Screening    Mariely Steele PA-C  [facility]  "

## 2019-11-05 NOTE — RESULT ENCOUNTER NOTE
Octavio Strauss  Your urine does not show excess protein.   Please call or MyChart my office with any questions or concerns.    Mariely Steele, PAC

## 2019-11-05 NOTE — PATIENT INSTRUCTIONS
Work on diet and exercise and dietary changes and follow up with us in 3-4 weeks and recheck blood pressure and start medication if needed   Work on DASH diet- no more than 2400 mg sodium daily      Patient Education     Preventive Health Recommendations  Male Ages 50 - 64    Yearly exam:             See your health care provider every year in order to  o   Review health changes.   o   Discuss preventive care.    o   Review your medicines if your doctor has prescribed any.     Have a cholesterol test every 5 years, or more frequently if you are at risk for high cholesterol/heart disease.     Have a diabetes test (fasting glucose) every three years. If you are at risk for diabetes, you should have this test more often.     Have a colonoscopy at age 50, or have a yearly FIT test (stool test). These exams will check for colon cancer.      Talk with your health care provider about whether or not a prostate cancer screening test (PSA) is right for you.    You should be tested each year for STDs (sexually transmitted diseases), if you re at risk.     Shots: Get a flu shot each year. Get a tetanus shot every 10 years.     Nutrition:    Eat at least 5 servings of fruits and vegetables daily.     Eat whole-grain bread, whole-wheat pasta and brown rice instead of white grains and rice.     Get adequate Calcium and Vitamin D.     Lifestyle    Exercise for at least 150 minutes a week (30 minutes a day, 5 days a week). This will help you control your weight and prevent disease.     Limit alcohol to one drink per day.     No smoking.     Wear sunscreen to prevent skin cancer.     See your dentist every six months for an exam and cleaning.     See your eye doctor every 1 to 2 years.

## 2019-11-09 ENCOUNTER — MYC REFILL (OUTPATIENT)
Dept: FAMILY MEDICINE | Facility: CLINIC | Age: 54
End: 2019-11-09

## 2019-11-09 DIAGNOSIS — J20.9 ACUTE BRONCHITIS, UNSPECIFIED ORGANISM: ICD-10-CM

## 2019-11-11 NOTE — TELEPHONE ENCOUNTER
"Requested Prescriptions   Pending Prescriptions Disp Refills     albuterol (PROAIR HFA/PROVENTIL HFA/VENTOLIN HFA) 108 (90 Base) MCG/ACT inhaler 1 Inhaler 0     Sig: Inhale 2 puffs into the lungs every 6 hours as needed for shortness of breath / dyspnea or wheezing       Asthma Maintenance Inhalers - Anticholinergics Passed - 11/11/2019  6:12 AM        Passed - Patient is age 12 years or older        Passed - Recent (12 mo) or future (30 days) visit within the authorizing provider's specialty     Patient has had an office visit with the authorizing provider or a provider within the authorizing providers department within the previous 12 mos or has a future within next 30 days. See \"Patient Info\" tab in inbasket, or \"Choose Columns\" in Meds & Orders section of the refill encounter.              Passed - Medication is active on med list        albuterol (PROAIR HFA/PROVENTIL HFA/VENTOLIN HFA) 108 (90 Base) MCG/ACT inhaler  Last Written Prescription Date:  12/22/17  Last Fill Quantity: 1,  # refills: 0   Last office visit: 11/5/2019 with prescribing provider:  Dr. Upton   Future Office Visit:      No flowsheet data found.    "

## 2019-11-12 RX ORDER — ALBUTEROL SULFATE 90 UG/1
2 AEROSOL, METERED RESPIRATORY (INHALATION) EVERY 6 HOURS PRN
Qty: 1 INHALER | Refills: 0 | Status: SHIPPED | OUTPATIENT
Start: 2019-11-12 | End: 2022-01-06

## 2019-11-12 NOTE — TELEPHONE ENCOUNTER
Routing refill request to provider for review/approval because:  Out dated Rx last from 2017  Associated diagnosis not on protocol  Rosa De Dios RN  [facility]

## 2019-11-13 ENCOUNTER — TELEPHONE (OUTPATIENT)
Dept: SCHEDULING | Facility: CLINIC | Age: 54
End: 2019-11-13

## 2019-11-13 NOTE — TELEPHONE ENCOUNTER
The Patient declined Preventive Health Screens for:Preventive Health Screening Colonoscopy  Please review chart and follow-up with patient if needed.    Thank you

## 2019-11-18 DIAGNOSIS — Z12.11 SCREEN FOR COLON CANCER: ICD-10-CM

## 2019-11-18 DIAGNOSIS — Z00.00 ROUTINE GENERAL MEDICAL EXAMINATION AT A HEALTH CARE FACILITY: ICD-10-CM

## 2019-11-18 PROCEDURE — 82274 ASSAY TEST FOR BLOOD FECAL: CPT | Performed by: PHYSICIAN ASSISTANT

## 2019-11-22 LAB — HEMOCCULT STL QL IA: NEGATIVE

## 2019-11-23 NOTE — RESULT ENCOUNTER NOTE
Octavio Strauss  Your stool was normal with no evidence of blood.  This is a screening test for colon cancer and though it is not as thorough as a full colonoscopy it has been found accurate in detecting colon lesions.  We should repeat this annually.    Please call or MyChart my office with any questions or concerns.    Mariely Steele, PAC

## 2019-12-09 ENCOUNTER — HEALTH MAINTENANCE LETTER (OUTPATIENT)
Age: 54
End: 2019-12-09

## 2019-12-23 ENCOUNTER — OFFICE VISIT (OUTPATIENT)
Dept: FAMILY MEDICINE | Facility: CLINIC | Age: 54
End: 2019-12-23
Payer: COMMERCIAL

## 2019-12-23 ENCOUNTER — ANCILLARY PROCEDURE (OUTPATIENT)
Dept: GENERAL RADIOLOGY | Facility: CLINIC | Age: 54
End: 2019-12-23
Attending: FAMILY MEDICINE
Payer: COMMERCIAL

## 2019-12-23 VITALS
TEMPERATURE: 98.3 F | SYSTOLIC BLOOD PRESSURE: 140 MMHG | RESPIRATION RATE: 18 BRPM | HEART RATE: 73 BPM | OXYGEN SATURATION: 98 % | DIASTOLIC BLOOD PRESSURE: 96 MMHG | BODY MASS INDEX: 29.68 KG/M2 | HEIGHT: 71 IN | WEIGHT: 212 LBS

## 2019-12-23 DIAGNOSIS — R05.3 CHRONIC COUGH: ICD-10-CM

## 2019-12-23 DIAGNOSIS — R05.3 CHRONIC COUGH: Primary | ICD-10-CM

## 2019-12-23 DIAGNOSIS — I10 HYPERTENSION GOAL BP (BLOOD PRESSURE) < 140/90: ICD-10-CM

## 2019-12-23 PROCEDURE — 99214 OFFICE O/P EST MOD 30 MIN: CPT | Performed by: FAMILY MEDICINE

## 2019-12-23 PROCEDURE — 71046 X-RAY EXAM CHEST 2 VIEWS: CPT | Mod: FY

## 2019-12-23 RX ORDER — HYDROCHLOROTHIAZIDE 25 MG/1
25 TABLET ORAL DAILY
Qty: 90 TABLET | Refills: 1 | Status: SHIPPED | OUTPATIENT
Start: 2019-12-23 | End: 2019-12-23

## 2019-12-23 RX ORDER — IRBESARTAN 150 MG/1
150 TABLET ORAL AT BEDTIME
Qty: 90 TABLET | Refills: 1 | Status: SHIPPED | OUTPATIENT
Start: 2019-12-23 | End: 2020-06-21

## 2019-12-23 ASSESSMENT — MIFFLIN-ST. JEOR: SCORE: 1833.76

## 2019-12-23 NOTE — PROGRESS NOTES
"Subjective     Carlos A Crespo is a 54 year old male who presents to clinic today for the following health issues:    HPI   Hypertension Follow-up      Do you check your blood pressure regularly outside of the clinic? Yes     Are you following a low salt diet? Yes    Are your blood pressures ever more than 140 on the top number (systolic) OR more   than 90 on the bottom number (diastolic), for example 140/90? Yes    He also voices that since last fall he's been noticing constant phlegm in throat and \"always coughing\".  Patient reported uses inhaler occasionally. From coughing and phlegm build he complains of mild chest pain.    SUBJECTIVE:  Here today in follow-up of blood pressure but with also some cough symptoms as above.  This is been going on for quite some time.  He always feels the sense of phlegm in his throat.  Does not have any dysphasia or early voice fatigue.  Denies any reflux.  Does not have a history of allergies but he does have some sinus symptoms especially in the morning.      Years ago was on treatment for blood pressure, initially with lisinopril but he developed a cough from this and he was changed to Avapro.  That did not have the cough.  But he has been off the medication for a number of years and only recently has the blood pressure continued to elevate.  Saw Mariely for physical a couple of months ago and this was a plan to recheck.  Has been on a low-salt diet and numbers have come down but still elevated.    Review of systems otherwise negative.  Past medical, family, and social history reviewed and updated in chart.    OBJECTIVE:  BP (!) 140/96 (BP Location: Right arm, Patient Position: Sitting, Cuff Size: Adult Large)   Pulse 73   Temp 98.3  F (36.8  C) (Oral)   Resp 18   Ht 1.803 m (5' 11\")   Wt 96.2 kg (212 lb)   SpO2 98%   BMI 29.57 kg/m    Alert, pleasant, upbeat, and in no apparent discomfort.  Ears normal. Throat and pharynx normal. Neck supple. No adenopathy or masses " in the neck or supraclavicular regions. Sinuses non tender.  S1 and S2 normal, no murmurs, clicks, gallops or rubs. Regular rate and rhythm. Chest is clear; no wheezes or rales. No edema or JVD.  Past labs reviewed with the patient.     XRAY - my independent reading of the films showed significant scoliotic curve through the thoracic region but no obvious lung lesions    ASSESSMENT / PLAN:  (R05) Chronic cough  (primary encounter diagnosis)  Comment: Discussed the issue of chronic cough with the patient and how it can relate to drainage from above the reflux from below.  So like him to try some over-the-counter trials to see how this goes.  Otherwise we may want to have him see ENT for exam and possible nasopharyngoscopy  Plan: XR Chest 2 Views            (I10) Hypertension goal BP (blood pressure) < 140/90  Comment: Discussed mechanism of action of the proposed medication, as well as potential effects, both good and bad.  Patient expressed understanding and agreed with treatment.   Plan: Avapro 150 mg            Follow up 2 to 3 weeks contact me with results  S. Raji Upton MD    (Chart documentation completed in part with Dragon voice-recognition software.  Even though reviewed some grammatical, spelling, and word errors may remain.)

## 2019-12-23 NOTE — PATIENT INSTRUCTIONS
1) cough -see the advice below.  Pick one to start with (maybe the allergy part 1st since you have some sinus symptoms and not a lot of the reflux symptoms) and see how that goes.  And if that is not working we can try focusing on the reflux.  If neither of these works then I think we should get you in with an ENT specialist to take a look into that throat area.    2) blood pressure -based on the overall cardiac calculator below we are still in a low risk category.  But we still want to bring this down to a normal level.  Let us try a medicine called hydrochlorothiazide which is not related to anything that can worsen cough.  It is a water pill, so you may notice using the restroom more initially, but that typically fades in a week or 2.  And we should see blood pressure results within a couple of weeks, so keep an eye on it or even stop into the clinic and we can gladly recheck it for you.        Chronic cough can relate to either drainage from above (allergies, post-nasal drip) or acid reflux from below.  Try either:    - claritin or zyrtec (generic OK)   Or  - prilosec, zantac, or pepcid nightly    2 week trial - if no change, try the other.          [place] Coronary Risk Calculator    0-10% = Low Risk  10-20 = Intermediate Risk  20+% = High Risk    The 10-year ASCVD risk score (Maribel SANCHEZ Jr., et al., 2013) is: 4.5%    Values used to calculate the score:      Age: 54 years      Sex: Male      Is Non- : No      Diabetic: No      Tobacco smoker: No      Systolic Blood Pressure: 140 mmHg      Is BP treated: No      HDL Cholesterol: 58 mg/dL      Total Cholesterol: 210 mg/dL

## 2020-06-21 ENCOUNTER — MYC REFILL (OUTPATIENT)
Dept: FAMILY MEDICINE | Facility: CLINIC | Age: 55
End: 2020-06-21

## 2020-06-21 DIAGNOSIS — I10 HYPERTENSION GOAL BP (BLOOD PRESSURE) < 140/90: ICD-10-CM

## 2020-06-24 RX ORDER — IRBESARTAN 150 MG/1
150 TABLET ORAL AT BEDTIME
Qty: 90 TABLET | Refills: 0 | Status: SHIPPED | OUTPATIENT
Start: 2020-06-24 | End: 2020-09-30

## 2020-09-28 DIAGNOSIS — I10 HYPERTENSION GOAL BP (BLOOD PRESSURE) < 140/90: ICD-10-CM

## 2020-09-30 RX ORDER — IRBESARTAN 150 MG/1
TABLET ORAL
Qty: 90 TABLET | Refills: 0 | Status: SHIPPED | OUTPATIENT
Start: 2020-09-30 | End: 2021-01-17

## 2020-09-30 NOTE — TELEPHONE ENCOUNTER
Routing refill request to provider for review/approval because:  BP failed protocol.    Katt Bunch RN, [facility] T[facility]

## 2020-10-12 ENCOUNTER — ANCILLARY PROCEDURE (OUTPATIENT)
Dept: GENERAL RADIOLOGY | Facility: CLINIC | Age: 55
End: 2020-10-12
Attending: PHYSICIAN ASSISTANT

## 2020-10-12 ENCOUNTER — OFFICE VISIT (OUTPATIENT)
Dept: URGENT CARE | Facility: URGENT CARE | Age: 55
End: 2020-10-12

## 2020-10-12 VITALS
SYSTOLIC BLOOD PRESSURE: 151 MMHG | WEIGHT: 230 LBS | OXYGEN SATURATION: 97 % | BODY MASS INDEX: 32.2 KG/M2 | HEIGHT: 71 IN | DIASTOLIC BLOOD PRESSURE: 95 MMHG | TEMPERATURE: 98.1 F | HEART RATE: 87 BPM

## 2020-10-12 DIAGNOSIS — J06.9 VIRAL UPPER RESPIRATORY TRACT INFECTION WITH COUGH: Primary | ICD-10-CM

## 2020-10-12 PROCEDURE — 99213 OFFICE O/P EST LOW 20 MIN: CPT | Performed by: PHYSICIAN ASSISTANT

## 2020-10-12 PROCEDURE — U0003 INFECTIOUS AGENT DETECTION BY NUCLEIC ACID (DNA OR RNA); SEVERE ACUTE RESPIRATORY SYNDROME CORONAVIRUS 2 (SARS-COV-2) (CORONAVIRUS DISEASE [COVID-19]), AMPLIFIED PROBE TECHNIQUE, MAKING USE OF HIGH THROUGHPUT TECHNOLOGIES AS DESCRIBED BY CMS-2020-01-R: HCPCS | Performed by: PHYSICIAN ASSISTANT

## 2020-10-12 PROCEDURE — 71046 X-RAY EXAM CHEST 2 VIEWS: CPT | Performed by: RADIOLOGY

## 2020-10-12 RX ORDER — GUAIFENESIN AND DEXTROMETHORPHAN HYDROBROMIDE 600; 30 MG/1; MG/1
1 TABLET, EXTENDED RELEASE ORAL EVERY 12 HOURS
COMMUNITY
End: 2022-07-01

## 2020-10-12 ASSESSMENT — ENCOUNTER SYMPTOMS
WHEEZING: 0
SORE THROAT: 0
CONSTITUTIONAL NEGATIVE: 1
RHINORRHEA: 0
CHILLS: 0
FEVER: 0
MUSCULOSKELETAL NEGATIVE: 1
DIZZINESS: 0
COUGH: 1
WEAKNESS: 0
DIARRHEA: 0
FREQUENCY: 0
CHEST TIGHTNESS: 0
HEMATURIA: 0
EYE ITCHING: 0
NEUROLOGICAL NEGATIVE: 1
EYE DISCHARGE: 0
LIGHT-HEADEDNESS: 0
EYES NEGATIVE: 1
MYALGIAS: 0
PALPITATIONS: 0
ABDOMINAL PAIN: 0
CARDIOVASCULAR NEGATIVE: 1
SHORTNESS OF BREATH: 0
EYE REDNESS: 0
GASTROINTESTINAL NEGATIVE: 1
ADENOPATHY: 0
DIAPHORESIS: 0
VOMITING: 0
HEADACHES: 0
NAUSEA: 0
ENDOCRINE NEGATIVE: 1
POLYDIPSIA: 0
DYSURIA: 0

## 2020-10-12 ASSESSMENT — MIFFLIN-ST. JEOR: SCORE: 1910.4

## 2020-10-12 NOTE — PROGRESS NOTES
Chief Complaint:     Chief Complaint   Patient presents with     URI     SOB, cough       HPI: Carlos A Crespo is an 55 year old male who presents with chest congestion, cough nonproductive, occasional and shortness of breath.  Symptoms began several months ago. His cough worsened several days ago.  There is no wheezing and chest pain.  He denies any fever, nausea, vomiting, or diarrhea.      Patient denies any recent travel or exposure to know COVID positive tested individual.  Patient is not a healthcare worker or .      ROS:     Review of Systems   Constitutional: Negative.  Negative for chills, diaphoresis and fever.   HENT: Positive for congestion. Negative for ear pain, rhinorrhea and sore throat.    Eyes: Negative.  Negative for discharge, redness and itching.   Respiratory: Positive for cough. Negative for chest tightness, shortness of breath and wheezing.    Cardiovascular: Negative.  Negative for chest pain and palpitations.   Gastrointestinal: Negative.  Negative for abdominal pain, diarrhea, nausea and vomiting.   Endocrine: Negative.  Negative for polydipsia and polyuria.   Genitourinary: Negative for dysuria, frequency, hematuria and urgency.   Musculoskeletal: Negative.  Negative for myalgias.   Skin: Negative for rash.   Allergic/Immunologic: Negative for immunocompromised state.   Neurological: Negative.  Negative for dizziness, weakness, light-headedness and headaches.   Hematological: Negative for adenopathy.        Respiratory History  occasional episodes of bronchitis       Family History   Family History   Problem Relation Age of Onset     Hypertension Mother      Breast Cancer Mother      Diabetes Father      Hypertension Father      Pulmonary Embolism Father      Diabetes Maternal Uncle      Cerebrovascular Disease No family hx of      Coronary Artery Disease No family hx of         Problem history  Patient Active Problem List   Diagnosis     Scoliosis of thoracic spine      Hyperlipidemia LDL goal <100     Onychomycosis     Elevated blood-pressure reading without diagnosis of hypertension        Allergies  Allergies   Allergen Reactions     Influenza Vac Split [Flu Virus Vaccine]      Tetracycline      Ace Inhibitors Cough     Unsure as to which medication was used but probably Lisinopril. Does remember having a cough with one of the medications in the past.        Social History  Social History     Socioeconomic History     Marital status:      Spouse name: Tana     Number of children: 0     Years of education: Not on file     Highest education level: Not on file   Occupational History     Occupation: [employer] work.     Employer: [employer]     Comment: [employer]   Social Needs     Financial resource strain: Not on file     Food insecurity     Worry: Not on file     Inability: Not on file     Transportation needs     Medical: Not on file     Non-medical: Not on file   Tobacco Use     Smoking status: Never Smoker     Smokeless tobacco: Never Used   Substance and Sexual Activity     Alcohol use: Yes     Comment: occassional - 1 to 2 times a year     Drug use: No     Sexual activity: Yes     Partners: Female   Lifestyle     Physical activity     Days per week: Not on file     Minutes per session: Not on file     Stress: Not on file   Relationships     Social connections     Talks on phone: Not on file     Gets together: Not on file     Attends Shinto service: Not on file     Active member of club or organization: Not on file     Attends meetings of clubs or organizations: Not on file     Relationship status: Not on file     Intimate partner violence     Fear of current or ex partner: Not on file     Emotionally abused: Not on file     Physically abused: Not on file     Forced sexual activity: Not on file   Other Topics Concern      Service No     Blood Transfusions No     Caffeine Concern No     Comment: Pop - 2 sodas a day     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     Special Diet No     Back Care Yes     Comment: PT for ankle in past     Exercise Yes     Comment: Active at work Walks his dog 2 times at work     Bike Helmet No     Seat Belt Yes     Self-Exams No     Parent/sibling w/ CABG, MI or angioplasty before 65F 55M? No   Social History Narrative     Not on file        Current Meds    Current Outpatient Medications:      dextromethorphan-guaiFENesin (MUCINEX DM)  MG 12 hr tablet, Take 1 tablet by mouth every 12 hours, Disp: , Rfl:      albuterol (PROAIR HFA/PROVENTIL HFA/VENTOLIN HFA) 108 (90 Base) MCG/ACT inhaler, Inhale 2 puffs into the lungs every 6 hours as needed for shortness of breath / dyspnea or wheezing, Disp: 1 Inhaler, Rfl: 0     irbesartan (AVAPRO) 150 MG tablet, TAKE 1 TABLET BY MOUTH AT BEDTIME, Disp: 90 tablet, Rfl: 0        OBJECTIVE     Vital signs reviewed by Ryan Fatima PA-C  BP (!) 151/95 (BP Location: Left arm, Patient Position: Sitting, Cuff Size: Adult Regular)   Pulse 87   Temp 98.1  F (36.7  C) (Oral)   Ht 1.803 m (5' 11\")   Wt 104.3 kg (230 lb)   SpO2 97%   BMI 32.08 kg/m       Physical Exam  Vitals signs reviewed.   Constitutional:       General: He is not in acute distress.     Appearance: He is well-developed. He is not ill-appearing, toxic-appearing or diaphoretic.   HENT:      Head: Normocephalic and atraumatic.      Right Ear: Hearing, tympanic membrane, ear canal and external ear normal. No drainage, swelling or tenderness. Tympanic membrane is not perforated, erythematous, retracted or bulging.      Left Ear: Hearing, tympanic membrane, ear canal and external ear normal. No drainage, swelling or tenderness. Tympanic membrane is not perforated, erythematous, retracted or bulging.      Nose: Congestion present. No nasal tenderness, mucosal edema or " rhinorrhea.      Right Turbinates: Not enlarged or swollen.      Left Turbinates: Not enlarged or swollen.      Right Sinus: No maxillary sinus tenderness or frontal sinus tenderness.      Left Sinus: No maxillary sinus tenderness or frontal sinus tenderness.      Mouth/Throat:      Pharynx: No pharyngeal swelling, oropharyngeal exudate, posterior oropharyngeal erythema or uvula swelling.      Tonsils: No tonsillar exudate. 0 on the right. 0 on the left.   Eyes:      General: Lids are normal.         Right eye: No discharge.         Left eye: No discharge.      Conjunctiva/sclera: Conjunctivae normal.      Right eye: Right conjunctiva is not injected. No exudate.     Left eye: Left conjunctiva is not injected. No exudate.     Pupils: Pupils are equal, round, and reactive to light.   Neck:      Musculoskeletal: Normal range of motion and neck supple.   Cardiovascular:      Rate and Rhythm: Normal rate and regular rhythm.      Heart sounds: Normal heart sounds. No murmur. No friction rub. No gallop.    Pulmonary:      Effort: Pulmonary effort is normal. No accessory muscle usage, respiratory distress or retractions.      Breath sounds: Normal breath sounds and air entry. No stridor, decreased air movement or transmitted upper airway sounds. No decreased breath sounds, wheezing, rhonchi or rales.   Chest:      Chest wall: No tenderness.   Abdominal:      General: Bowel sounds are normal. There is no distension.      Palpations: Abdomen is soft. Abdomen is not rigid. There is no mass.      Tenderness: There is no abdominal tenderness. There is no guarding or rebound.   Musculoskeletal: Normal range of motion.   Lymphadenopathy:      Head:      Right side of head: No submental, submandibular, tonsillar, preauricular or posterior auricular adenopathy.      Left side of head: No submental, submandibular, tonsillar, preauricular or posterior auricular adenopathy.      Cervical:      Right cervical: No superficial or  posterior cervical adenopathy.     Left cervical: No superficial or posterior cervical adenopathy.   Skin:     General: Skin is warm.      Capillary Refill: Capillary refill takes less than 2 seconds.   Neurological:      Mental Status: He is alert and oriented to person, place, and time.      Cranial Nerves: No cranial nerve deficit.      Sensory: No sensory deficit.      Motor: No abnormal muscle tone.      Coordination: Coordination normal.      Deep Tendon Reflexes: Reflexes normal.   Psychiatric:         Behavior: Behavior normal. Behavior is cooperative.         Thought Content: Thought content normal.         Judgment: Judgment normal.           Labs:     No results found for any visits on 10/12/20.    Medical Decision Making:    Differential Diagnosis:  URI Adult/Peds:  Bronchitis-viral, Influenza, Pneumonia, Viral syndrome and Viral upper respiratory illness        ASSESSMENT    1. Viral upper respiratory tract infection with cough        PLAN    Patient presents with 2 day(s) chest congestion, cough nonproductive, occasional and shortness of breath.    Patient is in no acute distress.    Temp is 98.1 in clinic today, lung sounds were clear, and O2 sats at 97% on RA.  Imaging to rule out pneumonia was negative for any acute infiltrates or consolidations per my read.  COVID test ordered and swab collected in clinic today.  Rest, Push fluids, vaporizer, elevation of head of bed.  Ibuprofen and or Tylenol for any fever or body aches.  Over the counter cough suppressant- PRN- as discussed.   If symptoms worsen, recheck immediately otherwise follow up with your PCP in 1 week if symptoms are not improving.  Worrisome symptoms discussed with instructions to go to the ED.  Patient given COVID isolation instructions.  Patient verbalized understanding and agreed with this plan.    Droplet precautions were observed during this visit.  PPE was worn by me during the visit.  PPE included gown, double gloves, surgical  mask, and face shield.  Vital signs were collected by me as well as any NP, or OP swabs if needed.      Ryan Fatima PA-C  10/12/2020, 1:52 PM

## 2020-10-12 NOTE — PATIENT INSTRUCTIONS
"Discharge Instructions for COVID-19 Patients  You have--or may have--COVID-19. Please follow the instructions listed below.   If you have a weakened immune system, discuss with your doctor any other actions you need to take.  How can I protect others?  If you have symptoms (fever, cough, body aches or trouble breathing):    Stay home and away from others (self-isolate) until:  ? At least 10 days have passed since your symptoms started, And   ? You've had no fever--and no medicine that reduces fever--for 1 full day (24 hours), And    ? Your other symptoms have resolved (gotten better).  If you don't show symptoms, but testing showed that you have COVID-19:    Stay home and away from others (self-isolate). Follow the tips under \"How do I self-isolate?\" below for 10 days (20 days if you have a weak immune system).    You don't need to be retested for COVID-19 before going back to school or work. As long as you're fever-free and feeling better, you can go back to school, work and other activities after waiting the 10 or 20 days.   How do I self-isolate?    Stay in your own room, even for meals. Use your own bathroom if you can.    Stay away from others in your home. No hugging, kissing or shaking hands. No visitors.    Don't go to work, school or anywhere else.    Clean \"high touch\" surfaces often (doorknobs, counters, handles). Use household cleaning spray or wipes. You'll find a full list of  on the EPA website: www.epa.gov/pesticide-registration/list-n-disinfectants-use-against-sars-cov-2.    Cover your mouth and nose with a mask or other face covering to avoid spreading germs.    Wash your hands and face often. Use soap and water.    Caregivers in these groups are at risk for severe illness due to COVID-19:  ? People 65 years and older  ? People who live in a nursing home or long-term care facility  ? People with chronic disease (lung, heart, cancer, diabetes, kidney, liver, immunologic)  ? People who have a " weakened immune system, including those who:    Are in cancer treatment    Take medicine that weakens the immune system, such as corticosteroids    Had a bone marrow or organ transplant    Have an immune deficiency    Have poorly controlled HIV or AIDS    Are obese (body mass index of 40 or higher)    Smoke regularly    Caregivers should wear gloves while washing dishes, handling laundry and cleaning bedrooms and bathrooms.    Use caution when washing and drying laundry: Don't shake dirty laundry and use the warmest water setting that you can.    For more tips on managing your health at home, go to www.cdc.gov/coronavirus/2019-ncov/downloads/10Things.pdf.  How can I take care of myself at home?  1. Get lots of rest. Drink extra fluids (unless a doctor has told you not to).    2. Take Tylenol (acetaminophen) for fever or pain. If you have liver or kidney problems, ask your family doctor if it's okay to take Tylenol.     Adults can take either:  ? 650 mg (two 325 mg pills) every 4 to 6 hours, or   ? 1,000 mg (two 500 mg pills) every 8 hours as needed.  ? Note: Don't take more than 3,000 mg in one day. Acetaminophen is found in many medicines (both prescribed and over-the-counter medicines). Read all labels to be sure you don't take too much.   For children, check the Tylenol bottle for the right dose. The dose is based on the child's age or weight.  3. If you have other health problems (like cancer, heart failure, an organ transplant or severe kidney disease): Call your specialty clinic if you don't feel better in the next 2 days.    4. Know when to call 911. Emergency warning signs include:  ? Trouble breathing or shortness of breath  ? Pain or pressure in the chest that doesn't go away  ? Feeling confused like you haven't felt before, or not being able to wake up  ? Bluish-colored lips or face    5. Your doctor may have prescribed a blood thinner medicine. Follow their instructions.  Where can I get more  information?    [facility] - About COVID-19: [email or web address]    CDC - What to Do If You're Sick: www.cdc.gov/coronavirus/2019-ncov/about/steps-when-sick.html    CDC - Ending Home Isolation: www.cdc.gov/coronavirus/2019-ncov/hcp/disposition-in-home-patients.html    CDC - Caring for Someone: www.cdc.gov/coronavirus/2019-ncov/if-you-are-sick/care-for-someone.html    [facility] - Interim Guidance for Hospital Discharge to Home: www.health.[facility].mn./diseases/coronavirus/hcp/hospdischarge.pdf    [facility] clinical trials (COVID-19 research studies): clinicalaffairs.[facility]-clinical-trials    Below are the COVID-19 hotlines at the [place] Department of [facility] ([facility]). Interpreters are available.  ? For health questions: Call 649-116-7331 or 1-167.844.4559 (7 a.m. to 7 p.m.)  ? For questions about schools and childcare: Call 193-531-3749 or 1-278.637.6646 (7 a.m. to 7 p.m.)    For informational purposes only. Not to replace the advice of your health care provider. Clinically reviewed by the Infection Prevention Team. Copyright   2020 [facility]. All rights reserved. [employer] 323875 - REV 08/04/20.      Patient Education     Viral Upper Respiratory Illness (Adult)    You have a viral upper respiratory illness (URI), which is another term for the common cold. This illness is contagious during the first few days. It is spread through the air by coughing and sneezing. It may also be spread by direct contact (touching the sick person and then touching your own eyes, nose, or mouth). Frequent handwashing will decrease risk of spread. Most viral illnesses go away within 7 to 10 days with rest and simple home remedies. Sometimes the illness may last for several weeks. Antibiotics will not kill a virus, and they are generally not prescribed for this condition.  Home care    If symptoms are severe, rest at home for the first 2 to 3 days. When you resume activity, don't let yourself get  too tired.    Don't smoke. If you need help stopping, talk with your healthcare provider.    Avoid being exposed to cigarette smoke (yours or others ).    You may use acetaminophen or ibuprofen to control pain and fever, unless another medicine was prescribed. If you have chronic liver or kidney disease, have ever had a stomach ulcer or gastrointestinal bleeding, or are taking blood-thinning medicines, talk with your healthcare provider before using these medicines. Aspirin should never be given to anyone under 18 years of age who is ill with a viral infection or fever. It may cause severe liver or brain damage.    Your appetite may be poor, so a light diet is fine. Stay well hydrated by drinking 6 to 8 glasses of fluids per day (water, soft drinks, juices, tea, or soup). Extra fluids will help loosen secretions in the nose and lungs.    Over-the-counter cold medicines will not shorten the length of time you re sick, but they may be helpful for the following symptoms: cough, sore throat, and nasal and sinus congestion. If you take prescription medicines, ask your healthcare provider or pharmacist which over-the-counter medicines are safe to use. (Note: Don't use decongestants if you have high blood pressure.)  Follow-up care  Follow up with your healthcare provider, or as advised.  When to seek medical advice  Call your healthcare provider right away if any of these occur:    Cough with lots of colored sputum (mucus)    Severe headache; face, neck, or ear pain    Difficulty swallowing due to throat pain    Fever of 100.4 F (38 C) or higher, or as directed by your healthcare provider  Call 911  Call 911 if any of these occur:    Chest pain, shortness of breath, wheezing, or difficulty breathing    Coughing up blood    Very severe pain with swallowing, especially if it goes along with a muffled voice   Date Last Reviewed: 6/1/2018 2000-2019 [employer]. [address] [place] [address], [place] [address]. All  rights reserved. This information is not intended as a substitute for professional medical care. Always follow your healthcare professional's instructions.

## 2020-10-13 LAB
SARS-COV-2 RNA SPEC QL NAA+PROBE: NOT DETECTED
SPECIMEN SOURCE: NORMAL

## 2020-10-17 ENCOUNTER — OFFICE VISIT (OUTPATIENT)
Dept: URGENT CARE | Facility: URGENT CARE | Age: 55
End: 2020-10-17

## 2020-10-17 VITALS
SYSTOLIC BLOOD PRESSURE: 144 MMHG | OXYGEN SATURATION: 99 % | DIASTOLIC BLOOD PRESSURE: 82 MMHG | HEIGHT: 71 IN | RESPIRATION RATE: 16 BRPM | WEIGHT: 220.2 LBS | HEART RATE: 84 BPM | TEMPERATURE: 98.4 F | BODY MASS INDEX: 30.83 KG/M2

## 2020-10-17 DIAGNOSIS — R06.02 SHORTNESS OF BREATH: ICD-10-CM

## 2020-10-17 DIAGNOSIS — K21.00 GASTROESOPHAGEAL REFLUX DISEASE WITH ESOPHAGITIS, UNSPECIFIED WHETHER HEMORRHAGE: ICD-10-CM

## 2020-10-17 DIAGNOSIS — F41.9 ANXIETY: Primary | ICD-10-CM

## 2020-10-17 PROCEDURE — 99214 OFFICE O/P EST MOD 30 MIN: CPT | Performed by: INTERNAL MEDICINE

## 2020-10-17 PROCEDURE — 93000 ELECTROCARDIOGRAM COMPLETE: CPT | Performed by: INTERNAL MEDICINE

## 2020-10-17 RX ORDER — HYDROXYZINE HYDROCHLORIDE 25 MG/1
25 TABLET, FILM COATED ORAL EVERY 6 HOURS PRN
Qty: 30 TABLET | Refills: 0 | Status: SHIPPED | OUTPATIENT
Start: 2020-10-17 | End: 2020-10-20

## 2020-10-17 RX ORDER — HYDROXYZINE HYDROCHLORIDE 25 MG/1
25 TABLET, FILM COATED ORAL EVERY 6 HOURS PRN
Qty: 30 TABLET | Refills: 0 | Status: SHIPPED | OUTPATIENT
Start: 2020-10-17 | End: 2020-10-17

## 2020-10-17 RX ORDER — OMEPRAZOLE 40 MG/1
40 CAPSULE, DELAYED RELEASE ORAL DAILY
Qty: 30 CAPSULE | Refills: 1 | Status: SHIPPED | OUTPATIENT
Start: 2020-10-17 | End: 2021-04-19

## 2020-10-17 ASSESSMENT — MIFFLIN-ST. JEOR: SCORE: 1865.95

## 2020-10-17 NOTE — PATIENT INSTRUCTIONS
Call 911 and go to the Emergency Room if you develop worsened shortness of breath or more trouble breathing, or chest pain or pressure or tightness, or lightheadedness, or any of your symptoms get worse.

## 2020-10-17 NOTE — PROGRESS NOTES
SUBJECTIVE:  Carlos A Crespo is an 55 year old male who presents for feeling lightheaded and like can't breathe and anxiety issues.  Had h/o anxiety in 2002 which then improved with prozac or some similar drug but had side effects with that.  Not on any meds for anxiety for a while.  Dx with uri five days ago - had neg covid test and normal xray.  Started having cold sxs a couple weeks ago.  Started with cough.  Still has occasional cough which is occasionally productive of some clear saliva.  Some nasal congestion.  Currently not working as job moved far away.  No fevers, but rarely feels hot or cold.  No skin rashes.  No new meds or med changes.  Not sleeping well most nights recently.  Sometimes throat feels tight.  Does have past h/o gerd and sometimes recently does get sxs of some heartburn in chest and some throat or esophagus feeling tight and acidic taste in mouth.  Sometimes feels like food goes down to stomach slowish.  Normal BMs.  No n/v.  Sometimes finds his thoughts fixated on something or racing during the day, but occurs more at night.  Once is asleep only wakes up occasionally.     PMH:   has a past medical history of Allergic rhinitis, cause unspecified, Carpal tunnel syndrome, Low back pain, and Scoliosis of thoracic spine. anxiety in 2002.  gerd  Patient Active Problem List   Diagnosis     Scoliosis of thoracic spine     Hyperlipidemia LDL goal <100     Onychomycosis     Elevated blood-pressure reading without diagnosis of hypertension     Social History     Socioeconomic History     Marital status:      Spouse name: Tana     Number of children: 0     Years of education: None     Highest education level: None   Occupational History     Occupation: Warehouse work.     Employer: [employer]     Comment: [employer]   Social Needs     Financial resource strain: None     Food insecurity     Worry: None     Inability: None     Transportation needs     Medical: None     Non-medical: None    Tobacco Use     Smoking status: Never Smoker     Smokeless tobacco: Never Used   Substance and Sexual Activity     Alcohol use: Yes     Comment: occassional - 1 to 2 times a year     Drug use: No     Sexual activity: Yes     Partners: Female   Lifestyle     Physical activity     Days per week: None     Minutes per session: None     Stress: None   Relationships     Social connections     Talks on phone: None     Gets together: None     Attends Christianity service: None     Active member of club or organization: None     Attends meetings of clubs or organizations: None     Relationship status: None     Intimate partner violence     Fear of current or ex partner: None     Emotionally abused: None     Physically abused: None     Forced sexual activity: None   Other Topics Concern      Service No     Blood Transfusions No     Caffeine Concern No     Comment: Pop - 2 sodas a day     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     Special Diet No     Back Care Yes     Comment: PT for ankle in past     Exercise Yes     Comment: Active at work Walks his dog 2 times at work     Bike Helmet No     Seat Belt Yes     Self-Exams No     Parent/sibling w/ CABG, MI or angioplasty before 65F 55M? No   Social History Narrative     None     Family History   Problem Relation Age of Onset     Hypertension Mother      Breast Cancer Mother      Diabetes Father      Hypertension Father      Pulmonary Embolism Father      Diabetes Maternal Uncle      Cerebrovascular Disease No family hx of      Coronary Artery Disease No family hx of        ALLERGIES:  Influenza vac split [flu virus vaccine], Tetracycline, and Ace inhibitors    Current Outpatient Medications   Medication     albuterol (PROAIR HFA/PROVENTIL HFA/VENTOLIN HFA) 108 (90 Base) MCG/ACT inhaler      "dextromethorphan-guaiFENesin (MUCINEX DM)  MG 12 hr tablet     irbesartan (AVAPRO) 150 MG tablet     No current facility-administered medications for this visit.          ROS:  ROS is done and is negative for general/constitutional, eye, ENT, Respiratory, cardiovascular, GI, , Skin, musculoskeletal except as noted elsewhere.  All other review of systems negative except as noted elsewhere.      OBJECTIVE:  BP (!) 144/82   Pulse 84   Temp 98.4  F (36.9  C) (Oral)   Resp 16   Ht 1.803 m (5' 11\")   Wt 99.9 kg (220 lb 3.2 oz)   SpO2 99%   BMI 30.71 kg/m    GENERAL APPEARANCE: Alert, in no acute distress, anxious  EYES: normal  EARS: External ears normal. Canals clear. TM's normal.  NOSE:mildly inflamed mucosa  OROPHARYNX:normal  NECK:No adenopathy,masses or thyromegaly  RESP: normal and clear to auscultation  CV:regular rate and rhythm and no murmurs, clicks, or gallops  ABDOMEN: Abdomen soft, non-tender. BS normal. No masses, organomegaly  SKIN: no ulcers, lesions or rash  MUSCULOSKELETAL:Musculoskeletal normal      RESULTS  EKG: NSR, possible incomplete RBBB, non-specific T wave changes, no prior for comparison.  Negative covid test on 10/12/2020.   CXR 10/12/2020 - no acute cardiopulmonary disease.  No results found for this or any previous visit (from the past 48 hour(s)).    ASSESSMENT/PLAN:    ASSESSMENT / PLAN:  (F41.9) Anxiety  (primary encounter diagnosis)  Comment: anxiety seems to be the primary contributor to his sxs, but may be triggered in part by gerd sxs.  Will tx gerd and monitor sxs.  For now will rx prn atarax for anxiety, but if sxs persist, may need more chronic tx of his anxiety.   Plan: hydrOXYzine (ATARAX) 25 MG tablet,         Reviewed medication instructions and side effects. Follow up if experiences side effects.. I reviewed supportive care, otc meds to use if needed, expected course, and signs of concern.  Follow up with pcp within 3 week(s) or sooner if worsens in any way.  " Reviewed red flag symptoms and is to go to the ER if experiences any of these.    (K21.00) Gastroesophageal reflux disease with esophagitis, unspecified whether hemorrhage  Comment: has sxs c/w gerd and has past h/o.  This may be a contributing factor for his anxiety as well  Plan: omeprazole (PRILOSEC) 40 MG DR capsule        Reviewed medication instructions and side effects. Follow up if experiences side effects.. I reviewed supportive care, otc meds to use if needed, expected course, and signs of concern.  Follow up with pcp within 3 week(s) or sooner if worsens in any way.  Reviewed red flag symptoms and is to go to the ER if experiences any of these.    (R06.02) Shortness of breath  Comment: currently c/w anxiety.  No respiratory findings on his exam or recent cxr and covid was negative.  Will tx gerd and anxiety as above and monitor.   Plan: EKG 12-lead complete w/read - Clinics        I reviewed supportive care, otc meds to use if needed, expected course, and signs of concern.  Follow up with pcp within 3 week(s) or sooner if worsens in any way.  Reviewed red flag symptoms including chest pain or tightness, worsened shortness of breath, or any other sx that worsens and is to go to the ER if experiences any of these.        PPE worn: mask, shield, blue plastic gown, gloves.    See [facility] for orders, medications, letters, patient instructions    Abbie Yost M.D.

## 2020-10-19 ENCOUNTER — NURSE TRIAGE (OUTPATIENT)
Dept: NURSING | Facility: CLINIC | Age: 55
End: 2020-10-19

## 2020-10-19 ENCOUNTER — TELEPHONE (OUTPATIENT)
Dept: FAMILY MEDICINE | Facility: CLINIC | Age: 55
End: 2020-10-19

## 2020-10-19 DIAGNOSIS — F41.9 ANXIETY: ICD-10-CM

## 2020-10-19 NOTE — TELEPHONE ENCOUNTER
Forwarding to his primary care provider/clinic.  Was seen in urgent care over weekend.  PCP is Dr. Upton.  Leighann Menard RN

## 2020-10-19 NOTE — TELEPHONE ENCOUNTER
Reason for Call:  Other call back    Detailed comments: pt was seen in urgent care and prescribed anti anxiety medication. He took the anti anxiety medicaiton in the morning but when he took his blood pressure medication in the evening he had a racing heart all night    Phone Number Patient can be reached at: Home number on file [phone] (home)    Best Time: any    Can we leave a detailed message on this number? YES    Call taken on 10/19/2020 at 7:55 AM by Elizabeth Campoverde

## 2020-10-20 RX ORDER — ALPRAZOLAM 0.5 MG
0.5 TABLET ORAL 3 TIMES DAILY PRN
Qty: 12 TABLET | Refills: 0 | Status: SHIPPED | OUTPATIENT
Start: 2020-10-20 | End: 2020-10-21

## 2020-10-20 NOTE — TELEPHONE ENCOUNTER
This writer attempted to contact pt on 10/20/20      Reason for call schedule video or same day appt with Dr. Upton this week and left message.      If patient calls back:   Schedule Office Visit or video visit appointment within 2 business days with primary care, document that pt called and close encounter     I sent in a prescription for alprazolam to use in place of the hydroxyzine.  I think this will work better.  And let see if we can get him set up for a video visit (or even a same-day in person) with me during the next week    Melanie Lorenzo MA

## 2020-10-20 NOTE — TELEPHONE ENCOUNTER
I sent in a prescription for alprazolam to use in place of the hydroxyzine.  I think this will work better.  And let see if we can get him set up for a video visit (or even a same-day in person) with me during the next week

## 2020-10-20 NOTE — TELEPHONE ENCOUNTER
Patient reports that he has been having really bad panic attacks since last night.      No history - this is new for him.     Shortness of breath, shake, heart races, chest hurts and then gets a feeling of anxiety through his whole body.     Lasts for hours.  Is having one right now.     Went to MD on sat and was given medication and it is not helping.      Having difficulty breathing and lightheadedness.      Advised ED per protocol.  Patient would like message routed to MD.  Reports he does not have insurance and would prefer not to go to ED.  Reports will take an anxiety med and see if that helps.  May go to ED later.     Maria Victoria Foreman RN/[facility]    COVID 19 Nurse Triage Plan/Patient Instructions    Please be aware that novel coronavirus (COVID-19) may be circulating in the community. If you develop symptoms such as fever, cough, or SOB or if you have concerns about the presence of another infection including coronavirus (COVID-19), please contact your health care provider or visit [email or web address].     Disposition/Instructions    ED Visit recommended. Follow protocol based instructions.     Bring Your Own Device:  Please also bring your smart device(s) (smart phones, tablets, laptops) and their charging cables for your personal use and to communicate with your care team during your visit.    Thank you for taking steps to prevent the spread of this virus.  o Limit your contact with others.  o Wear a simple mask to cover your cough.  o Wash your hands well and often.    Resources    [facility]: About COVID-19: [email or web address]    CDC: What to Do If You're Sick: www.cdc.gov/coronavirus/2019-ncov/about/steps-when-sick.html    CDC: Ending Home Isolation: www.cdc.gov/coronavirus/2019-ncov/hcp/disposition-in-home-patients.html     CDC: Caring for Someone: www.cdc.gov/coronavirus/2019-ncov/if-you-are-sick/care-for-someone.html     LISA: Interim Guidance for [facility]  Discharge to Home: www.health.[facility].[place]/diseases/coronavirus/hcp/hospdischarge.pdf    [facility] clinical trials (COVID-19 research studies): [email or web address].[email or web address]/umn-clinical-trials     Below are the COVID-19 hotlines at the [place] Department of Health ([facility]). Interpreters are available.   o For health questions: Call 478-767-2278 or 1-212.774.2592 (7 a.m. to 7 p.m.)  o For questions about schools and childcare: Call 689-978-0730 or 1-791.640.2613 (7 a.m. to 7 p.m.)   o   Reason for Disposition    [1] Lightheadedness or dizziness AND [2] persists > 10 minutes AND [3] not relieved by reassurance provided by triager    Additional Information    Negative: Severe difficulty breathing (e.g., struggling for each breath, speaks in single words)    Negative: Bluish (or gray) lips or face now    Negative: Difficult to awaken or acting confused (e.g., disoriented, slurred speech)    Negative: Hysterical or combative behavior    Negative: Sounds like a life-threatening emergency to the triager    Negative: Chest pain    Negative: Palpitations, skipped heart beat, or rapid heart beat    Negative: Cough is main symptom    Negative: Suicide thoughts, threats, attempts, or questions    Negative: Depression is main problem or symptom (e.g., feelings of sadness or hopelessness)    Negative: [1] Difficulty breathing AND [2] persists > 10 minutes AND [3] not relieved by reassurance provided by triager    Protocols used: ANXIETY AND PANIC ATTACK-A-AH

## 2020-10-20 NOTE — TELEPHONE ENCOUNTER
Patient has an appointment scheduled with Dr Upton for 10/21/2020 at 1:20.  [facility]  2nd Floor  Primary Care

## 2020-10-21 ENCOUNTER — VIRTUAL VISIT (OUTPATIENT)
Dept: FAMILY MEDICINE | Facility: CLINIC | Age: 55
End: 2020-10-21

## 2020-10-21 DIAGNOSIS — I10 HYPERTENSION GOAL BP (BLOOD PRESSURE) < 140/90: ICD-10-CM

## 2020-10-21 DIAGNOSIS — F41.9 ANXIETY: Primary | ICD-10-CM

## 2020-10-21 PROCEDURE — 99214 OFFICE O/P EST MOD 30 MIN: CPT | Mod: 95 | Performed by: FAMILY MEDICINE

## 2020-10-21 RX ORDER — LORAZEPAM 0.5 MG/1
0.5 TABLET ORAL 3 TIMES DAILY PRN
Qty: 20 TABLET | Refills: 1 | Status: SHIPPED | OUTPATIENT
Start: 2020-10-21 | End: 2022-01-17

## 2020-10-21 RX ORDER — HYDROXYZINE HYDROCHLORIDE 25 MG/1
TABLET, FILM COATED ORAL
COMMUNITY
Start: 2020-10-17 | End: 2020-10-21

## 2020-10-21 NOTE — PROGRESS NOTES
"Carlos A Crespo is a 55 year old male who is being evaluated via a billable telephone visit.      The patient has been notified of following:     \"This telephone visit will be conducted via a call between you and your physician/provider. We have found that certain health care needs can be provided without the need for a physical exam.  This service lets us provide the care you need with a short phone conversation.  If a prescription is necessary we can send it directly to your pharmacy.  If lab work is needed we can place an order for that and you can then stop by our lab to have the test done at a later time.    Telephone visits are billed at different rates depending on your insurance coverage. During this emergency period, for some insurers they may be billed the same as an in-person visit.  Please reach out to your insurance provider with any questions.    If during the course of the call the physician/provider feels a telephone visit is not appropriate, you will not be charged for this service.\"    Patient has given verbal consent for Telephone visit?  Yes    What phone number would you like to be contacted at? [phone]    How would you like to obtain your AVS? Adis Zabala     Carlos A Crespo is a 55 year old male who presents via phone visit today for the following health issues:    HPI     Concerns: Would like to discuss axiety medication. His blood pressure this morning was really high.  He woke upi this morning and felt like his heart was beating really fast. Carlos A does not think he needs to be on so many different anxiety medication. He would like to discuss which one he should be taking.     Spoke with patient via phone today about anxiety with symptoms as above.  Patient is known to me and we have been monitoring over time for high blood pressure but is never really had any significant anxiety.  It turns out that nearly 20 years ago he was treated for a while with Paxil for anxiety but is " not really needed anything until recently.  Lost his job a little over a month ago and has not found a new one yet.  He thinks this is what has heightened things.  But he started having spells of rapid heart rate and dizziness and eventually was seen through urgent care.  That note was reviewed in his chart.  Given hydroxyzine but does not think this really helped.  Was also seen through the ER and tells me that they did blood tests and multiple EKGs which were all negative.  I am unable to access those records at this time but he tells me that they told him that his heart was just fine and that they thought this was an anxiety attack.  He was given 6 tablets of lorazepam and has used 1/2 tablet this morning which did seem to help a little.  But he was confused by the different medications and what they were for.  He had also contacted me about the hydroxyzine and I sent in some Xanax but he did not pick that up yet.  When he woke up this morning his blood pressure was in the 140s and he thought his heart rate was fast and this got him a bit worried.    Review of Systems   Constitutional, HEENT, cardiovascular, pulmonary, gi and gu systems are negative, except as otherwise noted.       Objective          Vitals:  No vitals were obtained today due to virtual visit.    healthy, alert and no distress  PSYCH: Alert and oriented times 3; coherent speech, normal   rate and volume, able to articulate logical thoughts, able   to abstract reason, no tangential thoughts, no hallucinations   or delusions  His affect is normal  RESP: No cough, no audible wheezing, able to talk in full sentences  Remainder of exam unable to be completed due to telephone visits    Past labs reviewed with the patient.         Assessment/Plan:    Assessment & Plan     Anxiety  Long discussion with the patient about this.  I cannot tell him offhand that everything is 100% okay with his heart, but we did discuss the reassuring things based on what we  "know over time as well as the studies at the emergency department.  I did discuss that potentially we may want to look a little more into this if rapid heart rate keeps happening with something like a Holter monitor.  Patient is unfortunately currently out of insurance but does have a contact number to get plugged in.  The most likely we are dealing with discrete anxiety attacks as part of an overall generalized anxiety.  We discussed how that can raise adrenaline levels and cause tachycardia, sweating, shortness of breath, dizziness.  We discussed baseline treatment but he declines at this time.  His wife chimes and says that she is on Zoloft for anxiety and we talked about perhaps starting this at some point in the future.  For now he would like to treated on an as-needed basis and I do think lorazepam would be a very good choice and it seems as though it is set well in his system.  He has 1 mg tablets but I prefer the half milligram tablets so I will go ahead and send those in.  Advised him to look online at reputable websites about some self-help information to help deal with anxiety and anxiety attacks.  And he can certainly contact me as needed during this time to see how he is doing.  Strongly encouraged him to get set up with insurance so that we have more to fall back on.  And he is actually due to be seen for an annual checkup and recheck of his high blood pressure next month.  - LORazepam (ATIVAN) 0.5 MG tablet; Take 1 tablet (0.5 mg) by mouth 3 times daily as needed for anxiety    Hypertension goal BP (blood pressure) < 140/90  As above.  May have to wait until he gets insurance.       BMI:   Estimated body mass index is 30.71 kg/m  as calculated from the following:    Height as of 10/17/20: 1.803 m (5' 11\").    Weight as of 10/17/20: 99.9 kg (220 lb 3.2 oz).            See Patient Instructions    Return in about 2 weeks (around 11/4/2020) for Contact me with progress.    Frances Upton MD  [facility]    Phone call duration:  26 minutes

## 2020-11-06 ENCOUNTER — NURSE TRIAGE (OUTPATIENT)
Dept: NURSING | Facility: CLINIC | Age: 55
End: 2020-11-06

## 2020-11-07 NOTE — TELEPHONE ENCOUNTER
"Pt reports \"ache in abdominal area\". Pt rates the discomfort moderate. Pt reports he has anxiety and was recently seen in the ER for his symptoms. Pt states he has taken ativan today. Pt reports he thinks his symptoms are from anxiety, feels like he needs to burp but can't.     Advised pt to avoid caffeine and anxiety triggers, try a calming activity, perhaps lying down and resting, sipping clear liquids will help abdominal discomfort. Call back if symptoms worsen or persist.     Pt verbalizes understanding and agrees to plan.     Additional Information    Negative: Severe difficulty breathing (e.g., struggling for each breath, speaks in single words)    Negative: Bluish (or gray) lips or face now    Negative: Difficult to awaken or acting confused (e.g., disoriented, slurred speech)    Negative: Hysterical or combative behavior    Negative: Sounds like a life-threatening emergency to the triager    [1] Started on anti-anxiety medication AND [2] no relief    Protocols used: ANXIETY AND PANIC ATTACK-A-AH      "

## 2020-11-09 NOTE — TELEPHONE ENCOUNTER
Patient was triaged and advised by [facility] staff nurse. Home care advice was given and patient was instructed to contact office if his symptoms were persisting or worsening.  No return call by patient to clinic as of yet. Closing out this encounter. Patient was given advice and plan for his symptoms. No additional outreach to be made at this time.    Daphne To RN  [facility]

## 2020-11-10 ENCOUNTER — NURSE TRIAGE (OUTPATIENT)
Dept: NURSING | Facility: CLINIC | Age: 55
End: 2020-11-10

## 2020-11-11 ENCOUNTER — MYC MEDICAL ADVICE (OUTPATIENT)
Dept: FAMILY MEDICINE | Facility: CLINIC | Age: 55
End: 2020-11-11

## 2020-11-11 NOTE — TELEPHONE ENCOUNTER
"C/o lower abdominal pain/pressure. States he feels like he needs to pass gas but has not been able to do so. Took Gas X w/ no relief yet. Takes Prilosec for GERD but says this does not feel like heartburn.  Says this feeling triggers his anxiety. He is more concerned w/ his anxiety now than his abdominal discomfort. Asks what he can do for anxiety. He has seen his doctor twice for this and took the lorazepam which he does not think helped. Advised home care, follow up with PCP.    Reason for Disposition    [1] Symptoms of anxiety or panic attack AND [2] is a chronic symptom (recurrent or ongoing AND present > 4 weeks)    Additional Information    Negative: Shock suspected (e.g., cold/pale/clammy skin, too weak to stand, low BP, rapid pulse)    Negative: Difficult to awaken or acting confused (e.g., disoriented, slurred speech)    Negative: Passed out (i.e., lost consciousness, collapsed and was not responding)    Negative: Sounds like a life-threatening emergency to the triager    Negative: Chest pain    Negative: Pain is mainly in upper abdomen  (if needed ask: \"is it mainly above the belly button?\")    Negative: Followed an abdomen (stomach) injury    Negative: [1] SEVERE pain (e.g., excruciating) AND [2] present > 1 hour    Negative: [1] SEVERE pain AND [2] age > 60    Negative: [1] Vomiting AND [2] contains red blood or black (\"coffee ground\") material  (Exception: few red streaks in vomit that only happened once)    Negative: Blood in bowel movements  (Exception: Blood on surface of BM with constipation)    Negative: Black or tarry bowel movements  (Exception: chronic-unchanged  black-grey bowel movements AND is taking iron pills or Pepto-bismol)    Negative: [1] Unable to urinate (or only a few drops) > 4 hours AND [2] bladder feels very full (e.g., palpable bladder or strong urge to urinate)    Negative: [1] Pain in the scrotum or testicle AND [2] present > 1 hour    Negative: Patient sounds very sick or weak " to the triager    Negative: [1] MILD-MODERATE pain AND [2] constant AND [3] present > 2 hours    Negative: [1] Vomiting AND [2] abdomen looks much more swollen than usual    Negative: [1] Vomiting AND [2] contains bile (green color)    Negative: White of the eyes have turned yellow (i.e., jaundice)    Negative: Fever > 103 F (39.4 C)    Negative: [1] Fever > 101 F (38.3 C) AND [2] age > 60    Negative: [1] Fever > 100.0 F (37.8 C) AND [2] bedridden (e.g., nursing home patient, CVA, chronic illness, recovering from surgery)    Negative: [1] Fever > 100.0 F (37.8 C) AND [2] diabetes mellitus or weak immune system (e.g., HIV positive, cancer chemo, splenectomy, organ transplant, chronic steroids)    Negative: [1] SEVERE pain AND [2] present < 1 hour    Negative: [1] MODERATE pain (e.g., interferes with normal activities) AND [2] pain comes and goes (cramps) AND [3] present > 24 hours  (Exception: pain with Vomiting or Diarrhea - see that Guideline)    Negative: [1] MILD pain (e.g., does not interfere with normal activities) AND [2] pain comes and goes (cramps) [3] present > 48 hours    Negative: Age > 60 years    Negative: Blood in urine (red, pink, or tea-colored)    Negative: Abdominal pain is a chronic symptom (recurrent or ongoing AND present > 4 weeks)    Negative: [1] MILD-MODERATE pain AND [2] constant and [3] present < 2 hours    [1] MILD-MODERATE pain AND [2] comes and goes (cramps)    Negative: Severe difficulty breathing (e.g., struggling for each breath, speaks in single words)    Negative: Bluish (or gray) lips or face now    Negative: Difficult to awaken or acting confused (e.g., disoriented, slurred speech)    Negative: Hysterical or combative behavior    Negative: Sounds like a life-threatening emergency to the triager    Negative: Chest pain    Negative: Palpitations, skipped heart beat, or rapid heart beat    Negative: Cough is main symptom    Negative: Suicide thoughts, threats, attempts, or  questions    Negative: Depression is main problem or symptom (e.g., feelings of sadness or hopelessness)    Negative: [1] Difficulty breathing AND [2] persists > 10 minutes AND [3] not relieved by reassurance provided by triager    Negative: [1] Lightheadedness or dizziness AND [2] persists > 10 minutes AND [3] not relieved by reassurance provided by triager    Negative: [1] Alcohol or drug abuse, known or suspected AND [2] feeling very shaky (i.e., visible tremors of hands)    Negative: Patient sounds very sick or weak to the triager    Negative: Symptoms interfere with work or school    Negative: Requesting to talk to a counselor (e.g., mental health worker, psychiatrist)    Negative: Patient sounds very upset or troubled to the triager    Negative: [1] Symptoms of anxiety or panic AND [2] has not been evaluated for this by physician    Negative: [1] Started on anti-anxiety medication AND [2] no relief    Negative: [1] Significant weight loss (or gain) AND [2] not dieting    Negative: Taking thyroid medications    Negative: Known or suspected caffeine abuse (e.g., > 2 cups of coffee/tea or > 4 cans of soda / day)    Negative: Alcohol or drug abuse, known or suspected    Negative: Taking herbal remedies    Negative: Recent traumatic event (e.g., death of a loved one, job loss, victim/witness of crime)    Negative: Symptoms interfere with sleep    Protocols used: ANXIETY AND PANIC ATTACK-A-AH, ABDOMINAL PAIN - MALE-A-AH

## 2021-01-13 ENCOUNTER — E-VISIT (OUTPATIENT)
Dept: FAMILY MEDICINE | Facility: CLINIC | Age: 56
End: 2021-01-13

## 2021-01-13 DIAGNOSIS — R05.9 COUGH: Primary | ICD-10-CM

## 2021-01-13 PROCEDURE — 99421 OL DIG E/M SVC 5-10 MIN: CPT | Performed by: FAMILY MEDICINE

## 2021-01-14 ENCOUNTER — HEALTH MAINTENANCE LETTER (OUTPATIENT)
Age: 56
End: 2021-01-14

## 2021-01-14 RX ORDER — PREDNISONE 20 MG/1
40 TABLET ORAL DAILY
Qty: 10 TABLET | Refills: 0 | Status: SHIPPED | OUTPATIENT
Start: 2021-01-14 | End: 2021-01-19

## 2021-01-14 RX ORDER — AZITHROMYCIN 250 MG/1
TABLET, FILM COATED ORAL
Qty: 6 TABLET | Refills: 0 | Status: SHIPPED | OUTPATIENT
Start: 2021-01-14 | End: 2021-01-26

## 2021-01-17 ENCOUNTER — MYC REFILL (OUTPATIENT)
Dept: FAMILY MEDICINE | Facility: CLINIC | Age: 56
End: 2021-01-17

## 2021-01-17 DIAGNOSIS — I10 HYPERTENSION GOAL BP (BLOOD PRESSURE) < 140/90: ICD-10-CM

## 2021-01-19 ENCOUNTER — OFFICE VISIT (OUTPATIENT)
Dept: URGENT CARE | Facility: URGENT CARE | Age: 56
End: 2021-01-19

## 2021-01-19 ENCOUNTER — ANCILLARY PROCEDURE (OUTPATIENT)
Dept: GENERAL RADIOLOGY | Facility: CLINIC | Age: 56
End: 2021-01-19
Attending: NURSE PRACTITIONER

## 2021-01-19 VITALS
HEART RATE: 65 BPM | RESPIRATION RATE: 16 BRPM | DIASTOLIC BLOOD PRESSURE: 91 MMHG | OXYGEN SATURATION: 99 % | BODY MASS INDEX: 30.96 KG/M2 | TEMPERATURE: 96.6 F | SYSTOLIC BLOOD PRESSURE: 146 MMHG | WEIGHT: 222 LBS

## 2021-01-19 DIAGNOSIS — S59.902A ELBOW INJURY, LEFT, INITIAL ENCOUNTER: Primary | ICD-10-CM

## 2021-01-19 DIAGNOSIS — S42.132A CLOSED DISPLACED FRACTURE OF CORACOID PROCESS OF LEFT SHOULDER, INITIAL ENCOUNTER: ICD-10-CM

## 2021-01-19 PROCEDURE — 99213 OFFICE O/P EST LOW 20 MIN: CPT | Performed by: NURSE PRACTITIONER

## 2021-01-19 PROCEDURE — 73070 X-RAY EXAM OF ELBOW: CPT | Mod: LT | Performed by: RADIOLOGY

## 2021-01-19 RX ORDER — IRBESARTAN 150 MG/1
150 TABLET ORAL AT BEDTIME
Qty: 90 TABLET | Refills: 0 | Status: SHIPPED | OUTPATIENT
Start: 2021-01-19 | End: 2021-04-17

## 2021-01-19 ASSESSMENT — ENCOUNTER SYMPTOMS
SHORTNESS OF BREATH: 0
RHINORRHEA: 0
CHILLS: 0
FEVER: 0
VOMITING: 0
DIARRHEA: 0
NAUSEA: 0
SORE THROAT: 0
DIAPHORESIS: 0
COUGH: 0

## 2021-01-19 NOTE — PROGRESS NOTES
SUBJECTIVE:   Carlos A Crespo is a 55 year old male presenting with a chief complaint of   Chief Complaint   Patient presents with     Elbow Injury (Cpm)     Walking dog last night, and dog pull him down on the ice, hit left elbow on the ice. Not been able to move arm, have been icing.       He is an established patient of [facility].      SUBJECTIVE:  Carlos A Crespo is a 55 year old male who sustained a left elbow injury last night  Mechanism of injury: ws walking the dog and the dog pulled him, he felt and landed on the left elbow.  Immediate symptoms: immediate pain, immediate swelling, inability to bear weight directly after injury. Symptoms have been worsening since that time. Prior history of related problems: no prior problems with this area in the past.      Review of Systems   Constitutional: Negative for chills, diaphoresis and fever.   HENT: Negative for congestion, ear pain, rhinorrhea and sore throat.    Respiratory: Negative for cough and shortness of breath.    Gastrointestinal: Negative for diarrhea, nausea and vomiting.   Musculoskeletal:        Left elbow injury and pain   All other systems reviewed and are negative.      Past Medical History:   Diagnosis Date     Allergic rhinitis, cause unspecified      Carpal tunnel syndrome     RIght wrist off and on.     Low back pain      Scoliosis of thoracic spine      Family History   Problem Relation Age of Onset     Hypertension Mother      Breast Cancer Mother      Diabetes Father      Hypertension Father      Pulmonary Embolism Father      Diabetes Maternal Uncle      Cerebrovascular Disease No family hx of      Coronary Artery Disease No family hx of      Current Outpatient Medications   Medication Sig Dispense Refill     acetaminophen-codeine (TYLENOL #3) 300-30 MG tablet Take 1 tablet by mouth every 6 hours as needed for severe pain 16 tablet 0     albuterol (PROAIR HFA/PROVENTIL HFA/VENTOLIN HFA) 108 (90 Base) MCG/ACT inhaler Inhale 2 puffs into  the lungs every 6 hours as needed for shortness of breath / dyspnea or wheezing 1 Inhaler 0     azithromycin (ZITHROMAX) 250 MG tablet Two tabs today.  One tab daily x 4 days. 6 tablet 0     dextromethorphan-guaiFENesin (MUCINEX DM)  MG 12 hr tablet Take 1 tablet by mouth every 12 hours       LORazepam (ATIVAN) 0.5 MG tablet Take 1 tablet (0.5 mg) by mouth 3 times daily as needed for anxiety 20 tablet 1     omeprazole (PRILOSEC) 40 MG DR capsule Take 1 capsule (40 mg) by mouth daily 30 capsule 1     irbesartan (AVAPRO) 150 MG tablet Take 1 tablet (150 mg) by mouth At Bedtime 90 tablet 0     predniSONE (DELTASONE) 20 MG tablet Take 2 tablets (40 mg) by mouth daily for 5 days (Patient not taking: Reported on 1/19/2021) 10 tablet 0     Social History     Tobacco Use     Smoking status: Never Smoker     Smokeless tobacco: Never Used   Substance Use Topics     Alcohol use: Yes     Comment: occassional - 1 to 2 times a year       OBJECTIVE  BP (!) 146/91 (BP Location: Left arm, Patient Position: Sitting, Cuff Size: Adult Large)   Pulse 65   Temp 96.6  F (35.9  C) (Tympanic)   Resp 16   Wt 100.7 kg (222 lb)   SpO2 99%   BMI 30.96 kg/m      Physical Exam  Vitals signs and nursing note reviewed.   Constitutional:       General: He is not in acute distress.     Appearance: He is well-developed. He is not diaphoretic.   HENT:      Head: Normocephalic and atraumatic.      Right Ear: External ear normal.      Left Ear: External ear normal.   Eyes:      Pupils: Pupils are equal, round, and reactive to light.   Neck:      Musculoskeletal: Normal range of motion and neck supple.   Pulmonary:      Effort: Pulmonary effort is normal. No respiratory distress.      Breath sounds: Normal breath sounds.   Musculoskeletal:      Comments: Left elbow exam: soft tissue tenderness and swelling at the tip elbow, reduced range of motion of left elbow, lateral epicondylar tenderness, medial epicondylar tenderness, ipsilateral  shoulder, wrist and hand exam is normal.   Lymphadenopathy:      Cervical: No cervical adenopathy.   Skin:     General: Skin is warm and dry.   Neurological:      General: No focal deficit present.      Mental Status: He is alert.      Cranial Nerves: No cranial nerve deficit.   Psychiatric:         Mood and Affect: Mood normal.         Labs:  Results for orders placed or performed in visit on 01/19/21 (from the past 24 hour(s))   XR Elbow Left 2 Views    Narrative    XR ELBOW LT 2 VW 1/19/2021 11:27 AM     HISTORY: fell on elbow last night, now left elbow is swollen and with  reduced ROM; Elbow injury, left, initial encounter    COMPARISON: None.      Impression    IMPRESSION: Acute mildly displaced fracture at the base of the  coracoid. Large elbow joint effusion. Normal alignment.     NAFISA HUGO MD       X-Ray was done, my findings are: small fracture noted    ASSESSMENT:      ICD-10-CM    1. Elbow injury, left, initial encounter  S59.902A XR Elbow Left 2 Views   2. Closed displaced fracture of coracoid process of left shoulder, initial encounter  S42.132A acetaminophen-codeine (TYLENOL #3) 300-30 MG tablet     Orthopedic & Spine  Referral        Medical Decision Making:    Differential Diagnosis:  MS Injury Pain: sprain, fracture, tendonitis, muscle strain, contusion, dislocation and bursitis      PLAN:  Rest painful area  ICE  Elevated  Pain medication as advised  Side effects of medication discussed  Referral to orthopedic  All questions answered and patient is in agreement with treatment paln    Patient Instructions     Patient Education     Upper Extremity Fracture       You have a break (fracture) of the arm, wrist, or hand. This may be a small crack in the bone. Or it may be a major break, with the broken parts pushed out of position. Most fractures will heal without surgery. But you may need surgery if the bones are far out of place or if the break is near the elbow. Surgery is done by an  orthopedic surgeon. This is a surgeon who specializes in treating bone, muscle, joint, and tendon problems.  Non-surgical treatment is with a special sling called a shoulder immobilizer, or a splint or cast, depending on the type of fracture and where the fracture is located. This fracture takes 4 to 6 weeks or longer to heal. The cast may need to be changed in 2 to 3 weeks as swelling goes down.  Home care  Follow these guidelines when caring for yourself:    If you were given a shoulder immobilizer, leave it in place. This will support the injured arm at your side. This is the best position for bone healing. The shoulder immobilizer can be adjusted. If it becomes loose, adjust it so that your forearm is level with the ground (horizontal). Your hand should be level with your elbow.    Put an ice pack on the injured area. Do this for 20 minutes every 1 to 2 hours the first day for pain relief. You can make an ice pack by wrapping a plastic bag of ice cubes in a thin towel. As the ice melts, be careful that the cast/splint/sling doesn t get wet. You can put the ice pack inside the sling and directly over the splint or cast. Continue using the ice pack 3 to 4 times a day for the next 2 days. Then use the ice pack as needed to ease pain and swelling.    Keep the cast, splint, or sling completely dry at all times. Bathe with your cast, splint, or sling out of the water. Protect it with a large plastic bag, rubber-banded or taped at the top end. If a fiberglass cast, splint, or sling gets wet, you can dry it with a hair dryer.    You may use acetaminophen or ibuprofen to control pain, unless another pain medicine was prescribed. If you have chronic liver or kidney disease, talk with your healthcare provider before using these medicines. Also talk with your provider if you ve had a stomach ulcer or gastrointestinal bleeding.    Don t put creams or objects under the cast if you have itching.  Follow-up care  Follow up with  your healthcare provider, or as advised. This is to make sure the bone is healing the way it should.  X-rays may be taken. You will be told of any new findings that may affect your care.  When to seek medical advice  Call your healthcare provider right away if any of these occur:    The cast or splint cracks    The plaster cast or splint becomes wet or soft    The fiberglass cast or splint stays wet for more than 24 hours    Bad odor from the cast or wound fluid stains the cast    Tightness or pain under the cast or splint gets worse    Fingers become swollen, cold, blue, numb, or tingly    You can t move your fingers    Skin around cast or splint becomes red or irritated    Fever of 100.4 F (38 C) or higher, or as directed by your healthcare provider    hCato Valerio last reviewed this educational content on 6/1/2019 2000-2020 [employer] [address] [place] [address], [place] [address]. All rights reserved. This information is not intended as a substitute for professional medical care. Always follow your healthcare professional's instructions.

## 2021-01-19 NOTE — PATIENT INSTRUCTIONS
Patient Education     Upper Extremity Fracture       You have a break (fracture) of the arm, wrist, or hand. This may be a small crack in the bone. Or it may be a major break, with the broken parts pushed out of position. Most fractures will heal without surgery. But you may need surgery if the bones are far out of place or if the break is near the elbow. Surgery is done by an orthopedic surgeon. This is a surgeon who specializes in treating bone, muscle, joint, and tendon problems.  Non-surgical treatment is with a special sling called a shoulder immobilizer, or a splint or cast, depending on the type of fracture and where the fracture is located. This fracture takes 4 to 6 weeks or longer to heal. The cast may need to be changed in 2 to 3 weeks as swelling goes down.  Home care  Follow these guidelines when caring for yourself:    If you were given a shoulder immobilizer, leave it in place. This will support the injured arm at your side. This is the best position for bone healing. The shoulder immobilizer can be adjusted. If it becomes loose, adjust it so that your forearm is level with the ground (horizontal). Your hand should be level with your elbow.    Put an ice pack on the injured area. Do this for 20 minutes every 1 to 2 hours the first day for pain relief. You can make an ice pack by wrapping a plastic bag of ice cubes in a thin towel. As the ice melts, be careful that the cast/splint/sling doesn t get wet. You can put the ice pack inside the sling and directly over the splint or cast. Continue using the ice pack 3 to 4 times a day for the next 2 days. Then use the ice pack as needed to ease pain and swelling.    Keep the cast, splint, or sling completely dry at all times. Bathe with your cast, splint, or sling out of the water. Protect it with a large plastic bag, rubber-banded or taped at the top end. If a fiberglass cast, splint, or sling gets wet, you can dry it with a hair dryer.    You may use  acetaminophen or ibuprofen to control pain, unless another pain medicine was prescribed. If you have chronic liver or kidney disease, talk with your healthcare provider before using these medicines. Also talk with your provider if you ve had a stomach ulcer or gastrointestinal bleeding.    Don t put creams or objects under the cast if you have itching.  Follow-up care  Follow up with your healthcare provider, or as advised. This is to make sure the bone is healing the way it should.  X-rays may be taken. You will be told of any new findings that may affect your care.  When to seek medical advice  Call your healthcare provider right away if any of these occur:    The cast or splint cracks    The plaster cast or splint becomes wet or soft    The fiberglass cast or splint stays wet for more than 24 hours    Bad odor from the cast or wound fluid stains the cast    Tightness or pain under the cast or splint gets worse    Fingers become swollen, cold, blue, numb, or tingly    You can t move your fingers    Skin around cast or splint becomes red or irritated    Fever of 100.4 F (38 C) or higher, or as directed by your healthcare provider    Chato Valerio last reviewed this educational content on 6/1/2019 2000-2020 [employer]. [address] [place] [address], [place] [address]. All rights reserved. This information is not intended as a substitute for professional medical care. Always follow your healthcare professional's instructions.

## 2021-01-19 NOTE — TELEPHONE ENCOUNTER
Routing refill request to provider for review/approval because:  Labs not current:  Creatinine, potassium  BP above goal    Daphne To RN  [facility]

## 2021-01-22 ENCOUNTER — ANCILLARY PROCEDURE (OUTPATIENT)
Dept: CT IMAGING | Facility: CLINIC | Age: 56
End: 2021-01-22
Attending: FAMILY MEDICINE

## 2021-01-22 ENCOUNTER — OFFICE VISIT (OUTPATIENT)
Dept: ORTHOPEDICS | Facility: CLINIC | Age: 56
End: 2021-01-22

## 2021-01-22 DIAGNOSIS — S42.132A CLOSED DISPLACED FRACTURE OF CORACOID PROCESS OF LEFT SHOULDER, INITIAL ENCOUNTER: ICD-10-CM

## 2021-01-22 DIAGNOSIS — S52.042A CLOSED DISPLACED FRACTURE OF CORONOID PROCESS OF LEFT ULNA, INITIAL ENCOUNTER: Primary | ICD-10-CM

## 2021-01-22 PROCEDURE — 29105 APPLICATION LONG ARM SPLINT: CPT | Mod: LT | Performed by: FAMILY MEDICINE

## 2021-01-22 PROCEDURE — 73200 CT UPPER EXTREMITY W/O DYE: CPT | Mod: LT | Performed by: RADIOLOGY

## 2021-01-22 PROCEDURE — 99214 OFFICE O/P EST MOD 30 MIN: CPT | Mod: 25 | Performed by: FAMILY MEDICINE

## 2021-01-22 NOTE — PROGRESS NOTES
"CHIEF COMPLAINT:  Pain of the Left Shoulder (Closed displaced fracture of coracoid process of left shoulder)     HISTORY OF PRESENT ILLNESS  Mr. Crespo is a pleasant 55 year old year old male who presents to clinic today with left elbow fracture.  Carlos A states that he suffered an elbow fracture three days ago. Walking his dog when he slipped on the ice, falling forward on to left elbow.  Recalls immediate pain and \"feeling something was wrong\" but no clunk or feeling if something shifted back into place. Elbow remained stiff but was able to flex and extend somewhat.  Seen in urgent care and diagnosed with coronoid fracture, placed in sling.    Additional history: as documented    MEDICAL HISTORY  Patient Active Problem List   Diagnosis     Scoliosis of thoracic spine     Hyperlipidemia LDL goal <100     Onychomycosis     Elevated blood-pressure reading without diagnosis of hypertension       Current Outpatient Medications   Medication Sig Dispense Refill     acetaminophen-codeine (TYLENOL #3) 300-30 MG tablet Take 1 tablet by mouth every 6 hours as needed for severe pain 16 tablet 0     albuterol (PROAIR HFA/PROVENTIL HFA/VENTOLIN HFA) 108 (90 Base) MCG/ACT inhaler Inhale 2 puffs into the lungs every 6 hours as needed for shortness of breath / dyspnea or wheezing 1 Inhaler 0     dextromethorphan-guaiFENesin (MUCINEX DM)  MG 12 hr tablet Take 1 tablet by mouth every 12 hours       irbesartan (AVAPRO) 150 MG tablet Take 1 tablet (150 mg) by mouth At Bedtime 90 tablet 0     LORazepam (ATIVAN) 0.5 MG tablet Take 1 tablet (0.5 mg) by mouth 3 times daily as needed for anxiety 20 tablet 1     omeprazole (PRILOSEC) 40 MG DR capsule Take 1 capsule (40 mg) by mouth daily 30 capsule 1     azithromycin (ZITHROMAX) 250 MG tablet Two tabs today.  One tab daily x 4 days. 6 tablet 0       Allergies   Allergen Reactions     Influenza Vac Split [Flu Virus Vaccine]      Tetracycline      Ace Inhibitors Cough     Unsure as to " which medication was used but probably Lisinopril. Does remember having a cough with one of the medications in the past.       Family History   Problem Relation Age of Onset     Hypertension Mother      Breast Cancer Mother      Diabetes Father      Hypertension Father      Pulmonary Embolism Father      Diabetes Maternal Uncle      Cerebrovascular Disease No family hx of      Coronary Artery Disease No family hx of        Additional medical/Social/Surgical histories reviewed in [facility] and updated as appropriate.     REVIEW OF SYSTEMS (1/22/2021)  A 10-point review of systems was obtained and is negative except for as noted in the HPI.      PHYSICAL EXAM  There were no vitals taken for this visit.    General  - normal appearance, in no obvious distress  HEENT  - conjunctivae not injected, moist mucous membranes  CV  - normal radial pulse  Pulm  - normal respiratory pattern, non-labored  Musculoskeletal -Left elbow  - inspection: Swelling left elbow diffusely  - palpation: Tender anteriorly at elbow joint.  Tender at UCL medially.  - ROM:  90 flexion to 25 degrees extension, 45 degrees supination and pronation is grossly intact.  - strength:5/5  strength, 5/5 hand intrinsics, 5/5 thumb extension. 5/5 A-OK sign.   - special tests:  (-) varus without pain  (-) valgus without pain, difficult to discern endpoint but no gross laxity or instability  Neuro  - no sensory or motor deficit, grossly normal coordination, normal muscle tone  Skin  - no ecchymosis, erythema, warmth, or induration, no obvious rash  Psych  - interactive, appropriate, normal mood and affect    IMAGING : XR left elbow 3V.   Exam: CT ELBOW LEFT W/O CONTRAST, 1/22/2021 12:55 PM     Indication: Fracture, elbow; Closed displaced fracture of coracoid  process of left shoulder, initial encounter     Comparison: Radiograph of the elbow 1/19/2021     TECHNIQUE: Helical CT of the left elbow with multiplanar  reconstructions and without the administration of  intravenous  contrast.     Findings:   The elbow is congruent. There is a minimally displaced fracture of the  coronoid process. In the ulnohumeral joint there is a 1 mm ossific  density posteriorly (series 3 image 56). Ossific fragment at the  medial joint line of the glenohumeral joint (series 4 image 95)  measuring up to 3 mm. The remainder of the ulna is intact. No humeral  or radial fracture.     Soft tissue swelling about the elbow. The brachialis and biceps appear  intact.                                                                      Impression:   1. Mildly displaced fracture of the coronoid process.  2. Two calcified joint bodies, including an ossific fragment within  the posterior ulnohumeral joint, and the other at the medial margin of  the ulnohumeral joint.     I have personally reviewed the examination and initial interpretation  and I agree with the findings.     GIBSON DEGROOT MD (Joe)     ASSESSMENT & PLAN  Mr. Crespo is a 55 year old year old male who presents to clinic today with acute left coronoid process fracture with mild displacement.  Case discussed with Dr. Linda Willis and ordered CT to further elucidate fracture.      Reviewed Urgent care notes  Independent interpretation of XR  Discussed with Dr. Lazaro MD    Diagnosis: Coronoid fracture of left elbow with displacement    -CT left elbow without contrast  -Patient placed in post mold splint  -Sling use  -Tylenol/ibuprofen dosage reviewed  -Follow up: Call patient after CT reviewed with our shoulder surgeons.    It was a pleasure seeing Carlos A today.    Matty Hall DO, [facility]  Primary Care Sports Medicine    40 minutes on date of the encounter doing chart review, history and examination, independent imaging review, documentation, and additional activities noted above.         Cast/splint application    Date/Time: 1/22/2021 1:07 PM  Performed by: Wilbert Conley RN  Authorized by: Matty Hall DO     Consent:      "Consent obtained:  Verbal    Consent given by:  Patient    Risks discussed:  Discoloration, numbness, pain and swelling    Alternatives discussed:  Alternative treatment  Pre-procedure details:     Sensation:  Normal  Procedure details:     Laterality:  Left    Location:  Elbow    Elbow:  L elbow    Strapping: no      Splint type:  Posterior slab    Supplies:  Fiberglass (5\" orthoglass)  Post-procedure details:     Pain:  Unchanged    Sensation:  Normal    Patient tolerance of procedure:  Tolerated well, no immediate complications    Patient provided with cast or splint care instructions: Yes    Comments:      Discussed keeping splint dry and reaching out to clinic if he has any questions or issues with the splint        "

## 2021-01-22 NOTE — LETTER
"    1/22/2021         RE: Carlos A Crespo  [address] [facility] [id number]        Dear Colleague,    Thank you for referring your patient, Carlos A Crespo, to the [facility] [place]. Please see a copy of my visit note below.    CHIEF COMPLAINT:  Pain of the Left Shoulder (Closed displaced fracture of coracoid process of left shoulder)     HISTORY OF PRESENT ILLNESS  Mr. Crespo is a pleasant 55 year old year old male who presents to clinic today with left elbow fracture.  Carlos A states that he suffered an elbow fracture three days ago. Walking his dog when he slipped on the ice, falling forward on to left elbow.  Recalls immediate pain and \"feeling something was wrong\" but no clunk or feeling if something shifted back into place. Elbow remained stiff but was able to flex and extend somewhat.  Seen in urgent care and diagnosed with coronoid fracture, placed in sling.    Additional history: as documented    MEDICAL HISTORY  Patient Active Problem List   Diagnosis     Scoliosis of thoracic spine     Hyperlipidemia LDL goal <100     Onychomycosis     Elevated blood-pressure reading without diagnosis of hypertension       Current Outpatient Medications   Medication Sig Dispense Refill     acetaminophen-codeine (TYLENOL #3) 300-30 MG tablet Take 1 tablet by mouth every 6 hours as needed for severe pain 16 tablet 0     albuterol (PROAIR HFA/PROVENTIL HFA/VENTOLIN HFA) 108 (90 Base) MCG/ACT inhaler Inhale 2 puffs into the lungs every 6 hours as needed for shortness of breath / dyspnea or wheezing 1 Inhaler 0     dextromethorphan-guaiFENesin (MUCINEX DM)  MG 12 hr tablet Take 1 tablet by mouth every 12 hours       irbesartan (AVAPRO) 150 MG tablet Take 1 tablet (150 mg) by mouth At Bedtime 90 tablet 0     LORazepam (ATIVAN) 0.5 MG tablet Take 1 tablet (0.5 mg) by mouth 3 times daily as needed for anxiety 20 tablet 1     omeprazole (PRILOSEC) 40 MG DR capsule Take 1 " capsule (40 mg) by mouth daily 30 capsule 1     azithromycin (ZITHROMAX) 250 MG tablet Two tabs today.  One tab daily x 4 days. 6 tablet 0       Allergies   Allergen Reactions     Influenza Vac Split [Flu Virus Vaccine]      Tetracycline      Ace Inhibitors Cough     Unsure as to which medication was used but probably Lisinopril. Does remember having a cough with one of the medications in the past.       Family History   Problem Relation Age of Onset     Hypertension Mother      Breast Cancer Mother      Diabetes Father      Hypertension Father      Pulmonary Embolism Father      Diabetes Maternal Uncle      Cerebrovascular Disease No family hx of      Coronary Artery Disease No family hx of        Additional medical/Social/Surgical histories reviewed in [facility] and updated as appropriate.     REVIEW OF SYSTEMS (1/22/2021)  A 10-point review of systems was obtained and is negative except for as noted in the HPI.      PHYSICAL EXAM  There were no vitals taken for this visit.    General  - normal appearance, in no obvious distress  HEENT  - conjunctivae not injected, moist mucous membranes  CV  - normal radial pulse  Pulm  - normal respiratory pattern, non-labored  Musculoskeletal -Left elbow  - inspection: Swelling left elbow diffusely  - palpation: Tender anteriorly at elbow joint.  Tender at UCL medially.  - ROM:  90 flexion to 25 degrees extension, 45 degrees supination and pronation is grossly intact.  - strength:5/5  strength, 5/5 hand intrinsics, 5/5 thumb extension. 5/5 A-OK sign.   - special tests:  (-) varus without pain  (-) valgus without pain, difficult to discern endpoint but no gross laxity or instability  Neuro  - no sensory or motor deficit, grossly normal coordination, normal muscle tone  Skin  - no ecchymosis, erythema, warmth, or induration, no obvious rash  Psych  - interactive, appropriate, normal mood and affect    IMAGING : XR left elbow 3V.   Exam: CT ELBOW LEFT W/O CONTRAST, 1/22/2021 12:55  PM     Indication: Fracture, elbow; Closed displaced fracture of coracoid  process of left shoulder, initial encounter     Comparison: Radiograph of the elbow 1/19/2021     TECHNIQUE: Helical CT of the left elbow with multiplanar  reconstructions and without the administration of intravenous  contrast.     Findings:   The elbow is congruent. There is a minimally displaced fracture of the  coronoid process. In the ulnohumeral joint there is a 1 mm ossific  density posteriorly (series 3 image 56). Ossific fragment at the  medial joint line of the glenohumeral joint (series 4 image 95)  measuring up to 3 mm. The remainder of the ulna is intact. No humeral  or radial fracture.     Soft tissue swelling about the elbow. The brachialis and biceps appear  intact.                                                                      Impression:   1. Mildly displaced fracture of the coronoid process.  2. Two calcified joint bodies, including an ossific fragment within  the posterior ulnohumeral joint, and the other at the medial margin of  the ulnohumeral joint.     I have personally reviewed the examination and initial interpretation  and I agree with the findings.     GIBSON (Demetrius DEGROOT MD     ASSESSMENT & PLAN  Mr. Crespo is a 55 year old year old male who presents to clinic today with acute left coronoid process fracture with mild displacement.  Case discussed with Dr. Linda Willis and ordered CT to further elucidate fracture.      Reviewed Urgent care notes  Independent interpretation of XR  Discussed with Dr. Lazaro MD    Diagnosis: Coronoid fracture of left elbow with displacement    -CT left elbow without contrast  -Patient placed in post mold splint  -Sling use  -Tylenol/ibuprofen dosage reviewed  -Follow up: Call patient after CT reviewed with our shoulder surgeons.    It was a pleasure seeing Carlos A today.    Matty Hall DO, [facility]  [facility]    40 minutes on date of the encounter  "doing chart review, history and examination, independent imaging review, documentation, and additional activities noted above.         Cast/splint application    Date/Time: 1/22/2021 1:07 PM  Performed by: Wilbert Conley RN  Authorized by: Matty Hall DO     Consent:     Consent obtained:  Verbal    Consent given by:  Patient    Risks discussed:  Discoloration, numbness, pain and swelling    Alternatives discussed:  Alternative treatment  Pre-procedure details:     Sensation:  Normal  Procedure details:     Laterality:  Left    Location:  Elbow    Elbow:  L elbow    Strapping: no      Splint type:  Posterior slab    Supplies:  Fiberglass (5\" orthoglass)  Post-procedure details:     Pain:  Unchanged    Sensation:  Normal    Patient tolerance of procedure:  Tolerated well, no immediate complications    Patient provided with cast or splint care instructions: Yes    Comments:      Discussed keeping splint dry and reaching out to clinic if he has any questions or issues with the splint            Again, thank you for allowing me to participate in the care of your patient.        Sincerely,        Matty Hall DO    "

## 2021-01-22 NOTE — LETTER
January 22, 2021      RE: Carlos A Crespo  [address] [facility] [address]       To whom it may concern:    Carlos A Crespo was seen in our clinic today for a left elbow fracture. He may return to work with the following restrictions:     No use of left arm.     These restrictions will be in effect for at least the next 6 weeks (3/5/21). He will likely have follow up in clinic prior to this and his restrictions may be adjusted at that time.      Sincerely,      Matty Hall DO

## 2021-01-22 NOTE — NURSING NOTE
Carlos A Crespo's chief complaint for this visit includes:  Chief Complaint   Patient presents with     Left Shoulder - Pain     Closed displaced fracture of coracoid process of left shoulder     PCP: Frances Upton    Referring Provider:  ADRI Campos [place] [id number] [facility] [place] [address]    Patient refused to take vitals    Do you need any medication refills at today's visit? Tesha Patel MA

## 2021-01-23 ENCOUNTER — NURSE TRIAGE (OUTPATIENT)
Dept: NURSING | Facility: CLINIC | Age: 56
End: 2021-01-23

## 2021-01-23 NOTE — TELEPHONE ENCOUNTER
Orthopedic surgeon yesterday for fractured elbow - put a fiberglass splint on him and he thinks it's too tight. He can feel tightness around his wrist which is painful. Normal color/sensation/temperature to fingers. Patient states he has not been elevating above his heart. Advised patient to get his elbow up above his heart, and ice his elbow. Call back with any changes / further concerns.    Per protocol advised homecare - continue to monitor.    Jemima Azul RN on 1/23/2021 at 5:47 PM    Reason for Disposition    Cast feels too tight    Additional Information    Negative: Splint or elastic bandage problems or questions    Negative: Symptoms or questions after surgery    Negative: Chest pain    Negative: Difficulty breathing    Negative: [1] SEVERE pain (e.g., excruciating, pain scale 8-10) under cast AND [2] not improved after pain medications and elevation    Negative: [1] SEVERE pain of fingers or toes AND [2] not improved after pain medications and elevation    Negative: [1] Numbness (loss of sensation) of fingers or toes AND [2] persists after 1 hour of elevation    Negative: [1] Tingling (pins and needles sensation) of fingers or toes AND [2] persists after 1 hour of elevation    Negative: Blueness or pallor of fingers or toes (compared to non-injured side)    Negative: Patient sounds very sick or weak to the triager    Negative: [1] Increasing pain under cast AND [2] cast put on > 24 hours ago AND [3] not improved after elevation    Negative: Can't wiggle fingers or toes    Negative: Drainage comes through cast or out of end of cast    Negative: [1] Caller has URGENT question AND [2] triager unable to answer question    Negative: [1] Fingers or toes become swollen (compared to noninjured side) AND [2] not improved after elevation    Negative: [1] Fingers or toes cold (compared to noninjured side) AND [2] not improved after elevation    Negative: Foreign body gets stuck under the cast    Negative: Bad odor  comes from underneath the cast    Negative: Unexplained fever occurs    Negative: [1] Plaster cast gets wet AND [2] is soft after attempted drying    Negative: [1] Plaster or fiberglass cast gets wet AND [2] metal pins sticking out of skin    Negative: [1] Skin becomes red or raw at edge of cast AND [2] present > 24 hours    Negative: Cast breaks, cracks or falls off    Negative: [1] Caller has NON-URGENT question AND [2] triager unable to answer question    Negative: Cast feels too loose    Protocols used: CAST SYMPTOMS AND [id number]

## 2021-01-25 ENCOUNTER — ALLIED HEALTH/NURSE VISIT (OUTPATIENT)
Dept: NURSING | Facility: CLINIC | Age: 56
End: 2021-01-25

## 2021-01-25 ENCOUNTER — NURSE TRIAGE (OUTPATIENT)
Dept: NURSING | Facility: CLINIC | Age: 56
End: 2021-01-25

## 2021-01-25 ENCOUNTER — TELEPHONE (OUTPATIENT)
Dept: ORTHOPEDICS | Facility: CLINIC | Age: 56
End: 2021-01-25

## 2021-01-25 ENCOUNTER — PATIENT OUTREACH (OUTPATIENT)
Dept: CARE COORDINATION | Facility: CLINIC | Age: 56
End: 2021-01-25

## 2021-01-25 DIAGNOSIS — S52.042A CLOSED DISPLACED FRACTURE OF CORONOID PROCESS OF LEFT ULNA, INITIAL ENCOUNTER: Primary | ICD-10-CM

## 2021-01-25 PROCEDURE — 99207 PR NO CHARGE NURSE ONLY: CPT

## 2021-01-25 NOTE — TELEPHONE ENCOUNTER
Pt called the clinic this AM regarding his left arm posterior slab splint that was placed on Friday here in clinic. He is complaining of numbness and tingling throughout his hand (all fingers) and general discomfort. On Friday, we discussed loosening the ace wrap if he felt it was too tight, but he did not try this. We discuss this again on the phone to see if this would help, but he was unwilling to try this on his own. He would like to come into the clinic to have it rewrapped, but is adamant that he does not want to be charged for anything. I advised that if we are just loosening the ace wrap and placing some extra padding, then he would not be charged, but if needed to redo the splint, then it is typically something we charge for. I explained to him that I do not get to decide who gets charged and who doesn't for care.     He will be coming in this AM. I sent a message to Dr. Hall to discuss options. Also sent a message to management to discuss insurance and cost concerns.    Douglas Conley RN

## 2021-01-25 NOTE — PROGRESS NOTES
Social Work Note  [facility]     Patient Name:  Carlos A Crespo  /Age:  1965 (55 year old)    Referral Source: Douglas Cyr RN  Reason for Referral:  Insurance assistance     was connected with patient today,  following a new wrapping of an elbow injury with questions regarding insurance coverage and lack of.  Spoke with Carlos A and he shared that he had been previously told that between he and his wife's income that their income exceeds the [place] guideline.  Discussed options of establishing coverage under his wife's plan through her employer.  Also, provided guidance on how to apply [place] for possible coverage.  He also mentioned that he had spoke to someone via [employer] and was looking at coverage through them.    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  Is hoping to get on full time after 500 hrs of work, which is several months out.  Patient was understanding of next steps and process to establish insurance.   Provided Carlos A with writer contact information and encouraged him to call with any additional concerns or questions.  Sw will continue to assist as needed.               LEAH Navarrete, [facility]    [facility]  [phone]  [email or web address]

## 2021-01-25 NOTE — TELEPHONE ENCOUNTER
[phone] is the [facility] phone number where he was treated and a cast was placed, for a broken elbow. He won't go to the ER because he doesn't have insurance. He will call the clinic when they open at 7 a.m. He said his cast is too tight. He has numbness and tingling in his arm.    Reason for Disposition    [1] Numbness (loss of sensation) of fingers or toes AND [2] persists after 1 hour of elevation    Additional Information    Negative: Splint or elastic bandage problems or questions    Negative: Symptoms or questions after surgery    Negative: Chest pain    Negative: Difficulty breathing    Negative: [1] SEVERE pain (e.g., excruciating, pain scale 8-10) under cast AND [2] not improved after pain medications and elevation     Pain at 7    Negative: [1] SEVERE pain of fingers or toes AND [2] not improved after pain medications and elevation    Protocols used: CAST SYMPTOMS AND [id number]

## 2021-01-26 NOTE — PROGRESS NOTES
Carlos A GLORIA Cherie comes into clinic today at the request of Dr. Hall for an assessment and adjustment of his left elbow posterior slab splint.    S: The patient is status post closed displaced Type II coronoid process fracture of left ulna, DOI: 1/18/21    There has been no history of infection or drainage. Pt complains of numbness/tingling through left hand and fingers. Aching throughout forearm as well. Elbow is fine. Pt reports elevating and icing with relief. Currently taking Tylenol for pain and icing over the elbow. Has not tried IBU or naproxen.    O: Unwrapped ACE, kept splint in splint in place to maintain elbow position. After about 3 minutes or rest, Pt reported that numbness and tingling had resolved. Forearm still sore, but better. Splint was still in good condition and not pinching or apply too much pressure anywhere.    A: Likely just ACE wrap was too tight, compressing nerves and blood flow. I educated Pt again on how to remove part of the ACE wrap to decompress as needed at home, but we re-wrapped much looser this time and I don't anticipate any issues.    P: Routine splint care discussed. The patient will follow up next Friday in clinic with Dr. Hall. If there are any concerns, patient will reach out to the clinic. Patient is agreeable with plan.    This service provided today was under the direct supervision of Dr. Willis, who was available if needed.    Douglas Conley RN

## 2021-02-05 ENCOUNTER — ANCILLARY PROCEDURE (OUTPATIENT)
Dept: GENERAL RADIOLOGY | Facility: CLINIC | Age: 56
End: 2021-02-05
Attending: FAMILY MEDICINE

## 2021-02-05 ENCOUNTER — OFFICE VISIT (OUTPATIENT)
Dept: ORTHOPEDICS | Facility: CLINIC | Age: 56
End: 2021-02-05

## 2021-02-05 DIAGNOSIS — S52.042A CLOSED DISPLACED FRACTURE OF CORONOID PROCESS OF LEFT ULNA, INITIAL ENCOUNTER: ICD-10-CM

## 2021-02-05 DIAGNOSIS — S52.042A CLOSED DISPLACED FRACTURE OF CORONOID PROCESS OF LEFT ULNA, INITIAL ENCOUNTER: Primary | ICD-10-CM

## 2021-02-05 PROCEDURE — 99213 OFFICE O/P EST LOW 20 MIN: CPT | Performed by: FAMILY MEDICINE

## 2021-02-05 PROCEDURE — 73070 X-RAY EXAM OF ELBOW: CPT | Mod: LT | Performed by: RADIOLOGY

## 2021-02-05 PROCEDURE — 73110 X-RAY EXAM OF WRIST: CPT | Mod: LT | Performed by: RADIOLOGY

## 2021-02-05 NOTE — PROGRESS NOTES
ESTABLISHED PATIENT FOLLOW-UP:  Follow Up of the Left Elbow (2 week follow up elbow fracture)       HISTORY OF PRESENT ILLNESS  Mr. Crespo is a pleasant 55 year old year old male who presents to clinic today for follow-up of left elbow fracture - coronoid.    Date of injury: 1/19/2021   date last seen: 1/22/19  Following Therapeutic Plan: Yes  Pain: Improving elbow  Function: NA  Interval History: Carlos A  has been compliant with use of the sling as well as splint.  He notes pain has been very tolerable.  He has had increased wrist pain and now that splint is off is noticing swelling at the left wrist.  He denies any pain after the fall on the ice 2 weeks ago at the wrist, but he has concerns given there was some pain in the wrist after last visit.    Additional medical/Social/Surgical histories reviewed in [facility] and updated as appropriate.    REVIEW OF SYSTEMS (2/5/2021)  CONSTITUTIONAL: Denies fever and weight loss  GASTROINTESTINAL: Denies abdominal pain, nausea, vomiting  MUSCULOSKELETAL: See HPI  SKIN: Denies any recent rash or lesion  NEUROLOGICAL: Denies numbness or focal weakness       PHYSICAL EXAM  There were no vitals taken for this visit.    General  - normal appearance, in no obvious distress  Musculoskeletal -Left elbow  - inspection: Mild left elbow swelling  - palpation: Tender anteriorly at elbow joint.  Tender at UCL medially.  - ROM:  90 flexion to 25 degrees extension, 45 degrees supination and pronation  - strength:5/5  strength, 5/5 hand intrinsics, 5/5 thumb extension. 5/5 A-OK sign.  Deferred elbow strength  - special tests:  (-) varus without pain  (-) valgus without pain  Neuro  - no sensory or motor deficit, grossly normal coordination, normal muscle tone  Skin  -Small superficial abrasion of ulnar aspect of left wrist at the distal margin of the splint.  No surrounding erythema or cellulitic component.  Psych  - interactive, appropriate, normal mood and affect    IMAGING : X-ray elbow  2 view left. Final results and radiologist's interpretation, available in the [facility] health record. Images were reviewed with the patient/family members in the office today. My personal interpretation of the performed imaging is ongoing healing of coronoid fracture, no further displacement.    X-ray left wrist with no acute osseous abnormality.  This was discussed with patient     ASSESSMENT & PLAN  Mr. Crespo is a 55 year old year old male who presents to clinic today with Follow Up of the Left Elbow (2 week follow up elbow fracture)    Diagnosis:  1.  Nondisplaced coronoid fracture of left elbow  2.  Left wrist pain and swelling  3.  Abrasion of left ulna    -Discontinuation of the splint, I would like him to continue use of the sling at all times  -He will start occupational therapy for gravity limited active assisted range of motion and progress from there.  -I will see him back in 2 weeks time, at that time we may consider discontinuation of sling  -Wrist pain likely secondary to position in the splint as well as abrasions suffered from splint irritation.  Gentle range of motion as tolerated.  Ice.  Images independently interpreted and negative for fracture.  -Topical antibiotic ointment and bandaging to continue for abrasion of left wrist.  -Follow up 2 weeks    It was a pleasure seeing Haley Hall DO, [facility]  Primary Care Sports Medicine

## 2021-02-05 NOTE — LETTER
2/5/2021         RE: Carlos A Crespo  [address] [facility] [address]        Dear Colleague,    Thank you for referring your patient, Carlos A Crespo, to the [facility] [place]. Please see a copy of my visit note below.    ESTABLISHED PATIENT FOLLOW-UP:  Follow Up of the Left Elbow (2 week follow up elbow fracture)       HISTORY OF PRESENT ILLNESS  Mr. Crespo is a pleasant 55 year old year old male who presents to clinic today for follow-up of left elbow fracture - coronoid.    Date of injury: 1/19/2021   date last seen: 1/22/19  Following Therapeutic Plan: Yes  Pain: Improving elbow  Function: NA  Interval History: Carlos A  has been compliant with use of the sling as well as splint.  He notes pain has been very tolerable.  He has had increased wrist pain and now that splint is off is noticing swelling at the left wrist.  He denies any pain after the fall on the ice 2 weeks ago at the wrist, but he has concerns given there was some pain in the wrist after last visit.    Additional medical/Social/Surgical histories reviewed in [facility] and updated as appropriate.    REVIEW OF SYSTEMS (2/5/2021)  CONSTITUTIONAL: Denies fever and weight loss  GASTROINTESTINAL: Denies abdominal pain, nausea, vomiting  MUSCULOSKELETAL: See HPI  SKIN: Denies any recent rash or lesion  NEUROLOGICAL: Denies numbness or focal weakness       PHYSICAL EXAM  There were no vitals taken for this visit.    General  - normal appearance, in no obvious distress  Musculoskeletal -Left elbow  - inspection: Mild left elbow swelling  - palpation: Tender anteriorly at elbow joint.  Tender at UCL medially.  - ROM:  90 flexion to 25 degrees extension, 45 degrees supination and pronation  - strength:5/5  strength, 5/5 hand intrinsics, 5/5 thumb extension. 5/5 A-OK sign.  Deferred elbow strength  - special tests:  (-) varus without pain  (-) valgus without pain  Neuro  - no sensory or motor deficit,  grossly normal coordination, normal muscle tone  Skin  -Small superficial abrasion of ulnar aspect of left wrist at the distal margin of the splint.  No surrounding erythema or cellulitic component.  Psych  - interactive, appropriate, normal mood and affect    IMAGING : X-ray elbow 2 view left. Final results and radiologist's interpretation, available in the [facility] health record. Images were reviewed with the patient/family members in the office today. My personal interpretation of the performed imaging is ongoing healing of coronoid fracture, no further displacement.    X-ray left wrist with no acute osseous abnormality.  This was discussed with patient     ASSESSMENT & PLAN  Mr. Crespo is a 55 year old year old male who presents to clinic today with Follow Up of the Left Elbow (2 week follow up elbow fracture)    Diagnosis:  1.  Nondisplaced coronoid fracture of left elbow  2.  Left wrist pain and swelling  3.  Abrasion of left ulna    -Discontinuation of the splint, I would like him to continue use of the sling at all times  -He will start occupational therapy for gravity limited active assisted range of motion and progress from there.  -I will see him back in 2 weeks time, at that time we may consider discontinuation of sling  -Wrist pain likely secondary to position in the splint as well as abrasions suffered from splint irritation.  Gentle range of motion as tolerated.  Ice.  Images independently interpreted and negative for fracture.  -Topical antibiotic ointment and bandaging to continue for abrasion of left wrist.  -Follow up 2 weeks    It was a pleasure seeing Haley Hall DO, [facility]  Primary Care Sports Medicine            Again, thank you for allowing me to participate in the care of your patient.        Sincerely,        Matty Hall DO

## 2021-02-09 ENCOUNTER — THERAPY VISIT (OUTPATIENT)
Dept: OCCUPATIONAL THERAPY | Facility: CLINIC | Age: 56
End: 2021-02-09

## 2021-02-09 DIAGNOSIS — S52.042A CLOSED DISPLACED FRACTURE OF CORONOID PROCESS OF LEFT ULNA, INITIAL ENCOUNTER: ICD-10-CM

## 2021-02-09 DIAGNOSIS — M25.522 LEFT ELBOW PAIN: ICD-10-CM

## 2021-02-09 PROCEDURE — 97165 OT EVAL LOW COMPLEX 30 MIN: CPT | Mod: GO | Performed by: OCCUPATIONAL THERAPIST

## 2021-02-09 PROCEDURE — 97140 MANUAL THERAPY 1/> REGIONS: CPT | Mod: GO | Performed by: OCCUPATIONAL THERAPIST

## 2021-02-09 PROCEDURE — 97110 THERAPEUTIC EXERCISES: CPT | Mod: GO | Performed by: OCCUPATIONAL THERAPIST

## 2021-02-09 NOTE — PROGRESS NOTES
Hand Therapy Initial Evaluation    Current Date:  2/9/2021  Referring Physician: Dr. Hall    Diagnosis: Left Coranoid Fracture  DOI: 1/18/21  Post: 3w 1 d    Subjective:  Carlos A Crespo is a 55 year old right hand dominant male.    Patient reports symptoms of pain, stiffness/loss of motion, weakness/loss of strength and edema of the left elbow and wrist which occurred due to a fall on the ice. Since onset symptoms are Gradually getting better.  Special tests:  x-ray and CT.  Previous treatment: sling.    General health as reported by patient is good.  Pertinent medical history includes:High Blood Pressure  Medical allergies:dust and dander  Surgical history: none.  Medication history: Anti-inflammatory, High Blood Pressure, Pain.    Occupational Profile Information:  Current occupation is   Currently working in alternate job   Job Tasks: Computer Work, Lifting, Carrying, Prolonged Sitting, Prolonged Standing, Pushing, Pulling, Repetitive Tasks  Prior functional level:  no limitations  Barriers include:none  Mobility: No difficulty  Transportation: drives  Leisure activities/hobbies: [employer] games, [employer]    Upper Extremity Functional Index Score:  SCORE:   Column Totals: /80: 52   (A lower score indicates greater disability.)    Objective:  Pain Level (Scale 0-10)   2/9/2021   At Rest 6/10   With Use 7/10     Pain Description  Date 2/9/2021   Location elbow   Pain Quality Aching   Frequency intermittent     Pain is worst  nighttime   Exacerbated by  moving the elbow   Relieved by NSAIDs,rest   Progression Gradually improving     Edema  Mild  Circumference:    Location: Right Left    2/9/21 2/9/21   Elbow crease 29 31.5          Sensation   WNL throughout all nerve distributions; per patient report    ROM  Pain Report:  + mild    ++ moderate    +++ severe   Elbow 2/9/2021   AAROM gravity eliminated Left   Extension -20++   Flexion 115++   Supination 60   Pronation 80      Strength  Deferred    Palpation  Pain Report:  - none    + mild    ++ moderate    +++ severe     2/9/21      Medial and lateral elbow ++                                            Assessment:  Patient presents with symptoms consistent with diagnosis of left elbow fracture,  with conservative intervention.     Patient's limitations or Problem List includes:  Pain, Decreased ROM/motion, Increased edema, Weakness, Decreased stability and Tightness in musculature of the left elbow which interferes with the patient's ability to perform Self Care Tasks (dressing, bathing), Work Tasks, Sleep Patterns, Recreational Activities and Household Chores as compared to previous level of function.    Rehab Potential:  Excellent - Return to full activity, no limitations    Patient will benefit from skilled Occupational Therapy to increase ROM, flexibility, overall strength and stability of elbow and decrease pain and edema to return to previous activity level and resume normal daily tasks and to reach their rehab potential.    Barriers to Learning:  No barrier    Communication Issues:  Patient appears to be able to clearly communicate and understand verbal and written communication and follow directions correctly.    Chart Review: Brief history including review of medical and/or therapy records relating to the presenting problem and Simple history review with patient    Identified Performance Deficits: bathing/showering, dressing, driving and community mobility, home establishment and management, work and leisure activities    Assessment of Occupational Performance:  5 or more Performance Deficits    Clinical Decision Making (Complexity): Low complexity    Treatment Explanation:  The following has been discussed with the patient:  RX ordered/plan of care  Anticipated outcomes  Possible risks and side effects    Plan:  Frequency:  2 X a month, once daily  Duration:  for 4 months    Treatment Plan:   Modalities:  Warmth/  ice  Therapeutic Exercise:  AROM, AAROM, PROM, Isotonics, Isometrics and Stabilization  Neuromuscular re-education:  Nerve Gliding and Kinesiotaping  Manual Techniques:  Joint mobilization, Myofascial release and Manual edema mobilization    Discharge Plan:  Achieve all LTG.  Independent in home treatment program.  Reach maximal therapeutic benefit.    Home Exercise Program:  Exercise Name: Elbow Active Range of Motion Gravity Eliminated Extension/Flexion  Exercise Name: Wrist Active Range of Motion Extension/Flexion with Gravity Eliminated    Next Visit:  AAROM of elbow and wrist  MFR   Edema Management

## 2021-02-19 ENCOUNTER — OFFICE VISIT (OUTPATIENT)
Dept: ORTHOPEDICS | Facility: CLINIC | Age: 56
End: 2021-02-19

## 2021-02-19 VITALS — BODY MASS INDEX: 30.96 KG/M2 | WEIGHT: 222 LBS

## 2021-02-19 DIAGNOSIS — S52.042D CLOSED DISPLACED FRACTURE OF CORONOID PROCESS OF LEFT ULNA WITH ROUTINE HEALING, SUBSEQUENT ENCOUNTER: Primary | ICD-10-CM

## 2021-02-19 PROCEDURE — 99213 OFFICE O/P EST LOW 20 MIN: CPT | Performed by: FAMILY MEDICINE

## 2021-02-19 NOTE — PATIENT INSTRUCTIONS
Thanks for coming today.  Ortho/Sports Medicine Clinic  [address] [place] [address]    To schedule future appointments in Ortho Clinic, you may call [phone].    To schedule ordered imaging by your Provider: Call [facility] at [phone]    [employer] available online at:   [email or web address]    Please call if any further questions or concerns [phone] and ask for the Orthopedic Department. Clinic hours 8 am to 5 pm.    Return to clinic if symptoms worsen.

## 2021-02-19 NOTE — PROGRESS NOTES
[facility] FOLLOW-UP VISIT 2/19/2021    Carlos A Crespo's chief complaint for this visit includes:  Chief Complaint   Patient presents with     RECHECK     follow up left elbow fracture      PCP: Frances Upton    Referring Provider:  No referring provider defined for this encounter.    Wt 100.7 kg (222 lb)   BMI 30.96 kg/m    Data Unavailable       Interval History:     Follow up reason: left elbow fracture     Following Therapeutic Plan: Yes     Pain: Improving    Function: Improving    Medical History:    Any recent changes to your medical history? No    Any new medication prescribed since last visit? No    Review of Systems:    Do you have fever, chills, weight loss? No    Do you have any vision problems? No    Do you have any chest pain or edema? No    Do you have any shortness of breath or wheezing?  No    Do you have stomach problems? No    Do you have any urinary track issues? No    Do you have any numbness or focal weakness? No    Do you have diabetes? No    Do you have problems with bleeding or clotting? No    Do you have an rashes or other skin lesions? No

## 2021-02-19 NOTE — LETTER
2/19/2021         RE: Carlos A Crespo  [address] [facility] [address]        Dear Colleague,    Thank you for referring your patient, Carlos A Crespo, to the [facility] [place]. Please see a copy of my visit note below.          [facility] FOLLOW-UP VISIT 2/19/2021    Carlos A Crespo's chief complaint for this visit includes:  Chief Complaint   Patient presents with     RECHECK     follow up left elbow fracture      PCP: Frances Upton    Referring Provider:  No referring provider defined for this encounter.    Wt 100.7 kg (222 lb)   BMI 30.96 kg/m    Data Unavailable       Interval History:     Follow up reason: left elbow fracture     Following Therapeutic Plan: Yes     Pain: Improving    Function: Improving    Medical History:    Any recent changes to your medical history? No    Any new medication prescribed since last visit? No    Review of Systems:    Do you have fever, chills, weight loss? No    Do you have any vision problems? No    Do you have any chest pain or edema? No    Do you have any shortness of breath or wheezing?  No    Do you have stomach problems? No    Do you have any urinary track issues? No    Do you have any numbness or focal weakness? No    Do you have diabetes? No    Do you have problems with bleeding or clotting? No    Do you have an rashes or other skin lesions? No      ESTABLISHED PATIENT FOLLOW-UP:  RECHECK (follow up left elbow fracture )       HISTORY OF PRESENT ILLNESS  Mr. Crespo is a pleasant 55 year old year old male who presents to clinic today for follow-up of left elbow fracture.      Follow up reason: left elbow fracture     Following Therapeutic Plan: Yes     Pain: Improving    Function: Improving    Interval History: Had OT visit.  Due to financial constraints discussed with OT, will perform strictly HEP and increase over the next month.    Wearing sling and compliant here.    Pain  improved greatly.    Additional medical/Social/Surgical histories reviewed in [facility] and updated as appropriate.    REVIEW OF SYSTEMS (2/22/2021)  CONSTITUTIONAL: Denies fever and weight loss  GASTROINTESTINAL: Denies abdominal pain, nausea, vomiting  MUSCULOSKELETAL: See HPI  SKIN: Denies any recent rash or lesion  NEUROLOGICAL: Denies numbness or focal weakness     PHYSICAL EXAM  Wt 100.7 kg (222 lb)   BMI 30.96 kg/m      General  - normal appearance, in no obvious distress  Musculoskeletal -Left elbow  - inspection: No left elbow swelling  - palpation: Resolved tender anteriorly at elbow joint.  Tender at UCL medially.  - ROM:  110 flexion to 20 degrees extension, improved supination and pronation  - strength:5/5  strength, 5/5 hand intrinsics, 5/5 thumb extension. 5/5 A-OK sign.  Deferred elbow strength.  - special tests:  (-) varus without pain  (-) valgus without pain  Neuro  - no sensory or motor deficit, grossly normal coordination, normal muscle tone  Skin  -Small superficial abrasion of ulnar aspect of left wrist at the distal margin of the splint.  No surrounding erythema or cellulitic component.  Psych  - interactive, appropriate, normal mood and affect     ASSESSMENT & PLAN  Mr. Crespo is a 55 year old year old male who presents to clinic today with RECHECK (follow up left elbow fracture )    Diagnosis:  (S52.042A) Closed displaced fracture of coronoid process of left ulna, initial encounter  (primary encounter diagnosis)    -Weaning sling  -Continue OT ROM and begin active ROM  -Strict lifting restrictions  -Follow up in 2-3 weeks with xrays    It was a pleasure seeing Haley Hall DO, [facility]  Primary Care Sports Medicine            Again, thank you for allowing me to participate in the care of your patient.        Sincerely,        Matty Hall DO

## 2021-02-22 NOTE — PROGRESS NOTES
ESTABLISHED PATIENT FOLLOW-UP:  RECHECK (follow up left elbow fracture )       HISTORY OF PRESENT ILLNESS  Mr. Crespo is a pleasant 55 year old year old male who presents to clinic today for follow-up of left elbow fracture.      Follow up reason: left elbow fracture     Following Therapeutic Plan: Yes     Pain: Improving    Function: Improving    Interval History: Had OT visit.  Due to financial constraints discussed with OT, will perform strictly HEP and increase over the next month.    Wearing sling and compliant here.    Pain improved greatly.    Additional medical/Social/Surgical histories reviewed in [facility] and updated as appropriate.    REVIEW OF SYSTEMS (2/22/2021)  CONSTITUTIONAL: Denies fever and weight loss  GASTROINTESTINAL: Denies abdominal pain, nausea, vomiting  MUSCULOSKELETAL: See HPI  SKIN: Denies any recent rash or lesion  NEUROLOGICAL: Denies numbness or focal weakness     PHYSICAL EXAM  Wt 100.7 kg (222 lb)   BMI 30.96 kg/m      General  - normal appearance, in no obvious distress  Musculoskeletal -Left elbow  - inspection: No left elbow swelling  - palpation: Resolved tender anteriorly at elbow joint.  Tender at UCL medially.  - ROM:  110 flexion to 20 degrees extension, improved supination and pronation  - strength:5/5  strength, 5/5 hand intrinsics, 5/5 thumb extension. 5/5 A-OK sign.  Deferred elbow strength.  - special tests:  (-) varus without pain  (-) valgus without pain  Neuro  - no sensory or motor deficit, grossly normal coordination, normal muscle tone  Skin  -Small superficial abrasion of ulnar aspect of left wrist at the distal margin of the splint.  No surrounding erythema or cellulitic component.  Psych  - interactive, appropriate, normal mood and affect     ASSESSMENT & PLAN  Mr. Crespo is a 55 year old year old male who presents to clinic today with RECHECK (follow up left elbow fracture )    Diagnosis:  (S52.042A) Closed displaced fracture of coronoid process of left  ulna, initial encounter  (primary encounter diagnosis)    -Weaning sling  -Continue OT ROM and begin active ROM  -Strict lifting restrictions  -Follow up in 2-3 weeks with xrays    It was a pleasure seeing Carlos A.    Matty Hall DO, CAQSM  Primary Care Sports Medicine

## 2021-03-17 ENCOUNTER — TELEPHONE (OUTPATIENT)
Dept: FAMILY MEDICINE | Facility: CLINIC | Age: 56
End: 2021-03-17

## 2021-03-17 NOTE — TELEPHONE ENCOUNTER
Patient Quality Outreach      Summary:    Patient has the following on his problem list/HM:     Hypertension   Last three blood pressure readings:  BP Readings from Last 3 Encounters:   01/19/21 (!) 146/91   10/17/20 (!) 144/82   10/12/20 (!) 151/95     Blood pressure: Failed    HTN Guidelines:  ? 139/89     Patient is due/failing the following:   BP check    Type of outreach:    Sent [employer] message.    Questions for provider review:    None                                                                                                                                     Debbie Manzo

## 2021-03-17 NOTE — TELEPHONE ENCOUNTER
Patient returning call about his blood pressure.  Patient # [phone], can leave a voicemail message.  Ava Wei MA  [facility]  2nd Floor  Primary Care

## 2021-04-17 ENCOUNTER — MYC REFILL (OUTPATIENT)
Dept: FAMILY MEDICINE | Facility: CLINIC | Age: 56
End: 2021-04-17

## 2021-04-17 DIAGNOSIS — I10 HYPERTENSION GOAL BP (BLOOD PRESSURE) < 140/90: ICD-10-CM

## 2021-04-19 RX ORDER — IRBESARTAN 150 MG/1
150 TABLET ORAL AT BEDTIME
Qty: 30 TABLET | Refills: 0 | Status: SHIPPED | OUTPATIENT
Start: 2021-04-19 | End: 2021-05-27

## 2021-04-19 NOTE — TELEPHONE ENCOUNTER
Routing refill request to provider for review/approval because:  Labs not current:    Creatinine   Date Value Ref Range Status   11/05/2019 0.84 0.66 - 1.25 mg/dL Final     Potassium   Date Value Ref Range Status   11/05/2019 4.0 3.4 - 5.3 mmol/L Final       BP Readings from Last 6 Encounters:   01/19/21 (!) 146/91   10/17/20 (!) 144/82   10/12/20 (!) 151/95   12/23/19 (!) 140/96   11/05/19 (!) 162/102   11/04/18 (!) 154/94     Madelin Woody RN

## 2021-05-13 PROBLEM — M25.522 LEFT ELBOW PAIN: Status: RESOLVED | Noted: 2021-02-09 | Resolved: 2021-05-13

## 2021-05-13 NOTE — PROGRESS NOTES
Discharge Summary - Hand Therapy    Patient did not return to therapy did not return to therapy after the initial evaluation due to financial issues. We will assume that patient's goals were met.    [facility]

## 2021-05-25 ENCOUNTER — MYC REFILL (OUTPATIENT)
Dept: FAMILY MEDICINE | Facility: CLINIC | Age: 56
End: 2021-05-25

## 2021-05-25 DIAGNOSIS — I10 HYPERTENSION GOAL BP (BLOOD PRESSURE) < 140/90: ICD-10-CM

## 2021-05-26 RX ORDER — IRBESARTAN 150 MG/1
150 TABLET ORAL AT BEDTIME
Qty: 30 TABLET | Refills: 0 | OUTPATIENT
Start: 2021-05-26

## 2021-05-26 NOTE — TELEPHONE ENCOUNTER
"Routing refill request to provider for review/approval because:  Requested Prescriptions   Pending Prescriptions Disp Refills    irbesartan (AVAPRO) 150 MG tablet 30 tablet 0     Sig: Take 1 tablet (150 mg) by mouth At Bedtime Needs follow up visit for any further refill.       Angiotensin-II Receptors Failed - 5/26/2021  8:05 AM        Failed - Last blood pressure under 140/90 in past 12 months     BP Readings from Last 3 Encounters:   01/19/21 (!) 146/91   10/17/20 (!) 144/82   10/12/20 (!) 151/95                 Failed - Normal serum creatinine on file in past 12 months     Recent Labs   Lab Test 11/05/19  0802   CR 0.84       Ok to refill medication if creatinine is low          Failed - Normal serum potassium on file in past 12 months     Recent Labs   Lab Test 11/05/19  0802   POTASSIUM 4.0                    Passed - Recent (12 mo) or future (30 days) visit within the authorizing provider's specialty     Patient has had an office visit with the authorizing provider or a provider within the authorizing providers department within the previous 12 mos or has a future within next 30 days. See \"Patient Info\" tab in inbasket, or \"Choose Columns\" in Meds & Orders section of the refill encounter.              Passed - Medication is active on med list        Passed - Patient is age 18 or older               Katt LEWIS, RN          "

## 2021-09-16 ENCOUNTER — MYC MEDICAL ADVICE (OUTPATIENT)
Dept: FAMILY MEDICINE | Facility: CLINIC | Age: 56
End: 2021-09-16

## 2021-09-16 DIAGNOSIS — I10 HYPERTENSION GOAL BP (BLOOD PRESSURE) < 140/90: ICD-10-CM

## 2021-09-17 RX ORDER — IRBESARTAN 150 MG/1
150 TABLET ORAL AT BEDTIME
Qty: 90 TABLET | Refills: 0 | OUTPATIENT
Start: 2021-09-17

## 2021-09-17 NOTE — TELEPHONE ENCOUNTER
Refill denied to the pharmacy.   If patient schedules an appointment we can provide a livier refill.

## 2021-09-17 NOTE — TELEPHONE ENCOUNTER
"Routing refill request to provider for review/approval because:  Requested Prescriptions   Pending Prescriptions Disp Refills    irbesartan (AVAPRO) 150 MG tablet 90 tablet 0     Sig: Take 1 tablet (150 mg) by mouth At Bedtime Needs follow up visit for any further refill.        Angiotensin-II Receptors Failed - 9/17/2021 11:10 AM        Failed - Last blood pressure under 140/90 in past 12 months       BP Readings from Last 3 Encounters:   01/19/21 (!) 146/91   10/17/20 (!) 144/82   10/12/20 (!) 151/95                 Failed - Normal serum creatinine on file in past 12 months     Recent Labs   Lab Test 11/05/19  0802   CR 0.84       Ok to refill medication if creatinine is low          Failed - Normal serum potassium on file in past 12 months       Recent Labs   Lab Test 11/05/19  0802   POTASSIUM 4.0                    Passed - Recent (12 mo) or future (30 days) visit within the authorizing provider's specialty     Patient has had an office visit with the authorizing provider or a provider within the authorizing providers department within the previous 12 mos or has a future within next 30 days. See \"Patient Info\" tab in inbasket, or \"Choose Columns\" in Meds & Orders section of the refill encounter.              Passed - Medication is active on med list        Passed - Patient is age 18 or older                Katt LEWIS, RN        "

## 2021-09-22 RX ORDER — IRBESARTAN 150 MG/1
150 TABLET ORAL AT BEDTIME
Qty: 30 TABLET | Refills: 0 | Status: SHIPPED | OUTPATIENT
Start: 2021-09-22 | End: 2021-10-21

## 2021-09-22 NOTE — TELEPHONE ENCOUNTER
Patient currently does not have insurance. Unable to come into clinic at this time. He has been out of his medication for a week and a half.  Blood pressure a couple days ago was 168/96, does not monitor daily  Waking up with headaches in the morning, do go away as the day goes on, dizziness on and off    To provider to advise    Katt REYNAN, RN

## 2021-09-23 ENCOUNTER — TELEPHONE (OUTPATIENT)
Dept: FAMILY MEDICINE | Facility: CLINIC | Age: 56
End: 2021-09-23

## 2021-09-23 NOTE — TELEPHONE ENCOUNTER
"Spoke to patient. He was extremeley argumentative. Threw out about 30 f-bombs during the course of the conversation. \"You people just want me to drop dead on the warehouse floor so that's what I'm going to do! Fuck all of you! I don't have the money to come in and be seen so I guess I'll just die here on this warehouse floor because YOU people won't give me a refill. I don't have the money to come in so I guess I'll just die here\". \"And you can tell Dr Upton to fuck off too\"    He hung up on me.   "

## 2021-09-23 NOTE — TELEPHONE ENCOUNTER
Understood that we cannot continue to keep prescribing a medication if it is not monitored and if his blood pressure is not controlled, which has not been in clinic.  There are many resources out there to find insurance and if this does not work he could check with [facility].  But this is the last refill until he is seen.  Or at least lab work, a virtual visit, and a blood pressure recheck in the office

## 2021-09-30 ENCOUNTER — ANCILLARY PROCEDURE (OUTPATIENT)
Dept: GENERAL RADIOLOGY | Facility: CLINIC | Age: 56
End: 2021-09-30
Attending: PHYSICIAN ASSISTANT

## 2021-09-30 ENCOUNTER — ANCILLARY PROCEDURE (OUTPATIENT)
Dept: ULTRASOUND IMAGING | Facility: CLINIC | Age: 56
End: 2021-09-30
Attending: PREVENTIVE MEDICINE

## 2021-09-30 ENCOUNTER — OFFICE VISIT (OUTPATIENT)
Dept: URGENT CARE | Facility: URGENT CARE | Age: 56
End: 2021-09-30

## 2021-09-30 ENCOUNTER — OFFICE VISIT (OUTPATIENT)
Dept: PEDIATRICS | Facility: CLINIC | Age: 56
End: 2021-09-30
Attending: PHYSICIAN ASSISTANT

## 2021-09-30 VITALS
OXYGEN SATURATION: 97 % | HEART RATE: 84 BPM | DIASTOLIC BLOOD PRESSURE: 93 MMHG | TEMPERATURE: 98.2 F | WEIGHT: 230.1 LBS | SYSTOLIC BLOOD PRESSURE: 138 MMHG | BODY MASS INDEX: 32.09 KG/M2 | RESPIRATION RATE: 16 BRPM

## 2021-09-30 DIAGNOSIS — R10.11 ABDOMINAL WALL PAIN IN RIGHT UPPER QUADRANT: Primary | ICD-10-CM

## 2021-09-30 DIAGNOSIS — R05.9 COUGH: ICD-10-CM

## 2021-09-30 DIAGNOSIS — R10.11 RUQ ABDOMINAL PAIN: ICD-10-CM

## 2021-09-30 DIAGNOSIS — R09.82 POST-NASAL DRIP: ICD-10-CM

## 2021-09-30 DIAGNOSIS — R31.29 MICROSCOPIC HEMATURIA: ICD-10-CM

## 2021-09-30 DIAGNOSIS — R07.9 CHEST PAIN, UNSPECIFIED TYPE: ICD-10-CM

## 2021-09-30 DIAGNOSIS — R07.9 CHEST PAIN, UNSPECIFIED TYPE: Primary | ICD-10-CM

## 2021-09-30 LAB
ALBUMIN SERPL-MCNC: 4 G/DL (ref 3.4–5)
ALBUMIN UR-MCNC: NEGATIVE MG/DL
ALP SERPL-CCNC: 75 U/L (ref 40–150)
ALT SERPL W P-5'-P-CCNC: 22 U/L (ref 0–70)
ANION GAP SERPL CALCULATED.3IONS-SCNC: 6 MMOL/L (ref 3–14)
APPEARANCE UR: CLEAR
AST SERPL W P-5'-P-CCNC: 11 U/L (ref 0–45)
BASOPHILS # BLD AUTO: 0 10E3/UL (ref 0–0.2)
BASOPHILS NFR BLD AUTO: 0 %
BILIRUB SERPL-MCNC: 0.6 MG/DL (ref 0.2–1.3)
BILIRUB UR QL STRIP: NEGATIVE
BUN SERPL-MCNC: 11 MG/DL (ref 7–30)
CALCIUM SERPL-MCNC: 9.3 MG/DL (ref 8.5–10.1)
CHLORIDE BLD-SCNC: 107 MMOL/L (ref 94–109)
CO2 SERPL-SCNC: 25 MMOL/L (ref 20–32)
COLOR UR AUTO: YELLOW
CREAT SERPL-MCNC: 0.94 MG/DL (ref 0.66–1.25)
CRP SERPL-MCNC: <2.9 MG/L (ref 0–8)
EOSINOPHIL # BLD AUTO: 0.1 10E3/UL (ref 0–0.7)
EOSINOPHIL NFR BLD AUTO: 1 %
ERYTHROCYTE [DISTWIDTH] IN BLOOD BY AUTOMATED COUNT: 13.4 % (ref 10–15)
GFR SERPL CREATININE-BSD FRML MDRD: >90 ML/MIN/1.73M2
GLUCOSE BLD-MCNC: 100 MG/DL (ref 70–99)
GLUCOSE UR STRIP-MCNC: NEGATIVE MG/DL
HCT VFR BLD AUTO: 40 % (ref 40–53)
HGB BLD-MCNC: 13.1 G/DL (ref 13.3–17.7)
HGB UR QL STRIP: ABNORMAL
IMM GRANULOCYTES # BLD: 0 10E3/UL
IMM GRANULOCYTES NFR BLD: 0 %
KETONES UR STRIP-MCNC: NEGATIVE MG/DL
LEUKOCYTE ESTERASE UR QL STRIP: NEGATIVE
LIPASE SERPL-CCNC: 142 U/L (ref 73–393)
LYMPHOCYTES # BLD AUTO: 2.5 10E3/UL (ref 0.8–5.3)
LYMPHOCYTES NFR BLD AUTO: 27 %
MCH RBC QN AUTO: 30.6 PG (ref 26.5–33)
MCHC RBC AUTO-ENTMCNC: 32.8 G/DL (ref 31.5–36.5)
MCV RBC AUTO: 94 FL (ref 78–100)
MONOCYTES # BLD AUTO: 0.7 10E3/UL (ref 0–1.3)
MONOCYTES NFR BLD AUTO: 7 %
MUCOUS THREADS #/AREA URNS LPF: PRESENT /LPF
NEUTROPHILS # BLD AUTO: 5.9 10E3/UL (ref 1.6–8.3)
NEUTROPHILS NFR BLD AUTO: 65 %
NITRATE UR QL: NEGATIVE
NRBC # BLD AUTO: 0 10E3/UL
NRBC BLD AUTO-RTO: 0 /100
PH UR STRIP: 6 [PH] (ref 5–7)
PLATELET # BLD AUTO: 227 10E3/UL (ref 150–450)
POTASSIUM BLD-SCNC: 3.8 MMOL/L (ref 3.4–5.3)
PROT SERPL-MCNC: 8.3 G/DL (ref 6.8–8.8)
RBC # BLD AUTO: 4.28 10E6/UL (ref 4.4–5.9)
RBC #/AREA URNS AUTO: ABNORMAL /HPF
SODIUM SERPL-SCNC: 138 MMOL/L (ref 133–144)
SP GR UR STRIP: 1.02 (ref 1–1.03)
SQUAMOUS #/AREA URNS AUTO: ABNORMAL /LPF
TROPONIN I SERPL-MCNC: <0.015 UG/L (ref 0–0.04)
UROBILINOGEN UR STRIP-MCNC: NORMAL MG/DL
WBC # BLD AUTO: 9.1 10E3/UL (ref 4–11)
WBC #/AREA URNS AUTO: ABNORMAL /HPF

## 2021-09-30 PROCEDURE — 84484 ASSAY OF TROPONIN QUANT: CPT | Performed by: PREVENTIVE MEDICINE

## 2021-09-30 PROCEDURE — 86140 C-REACTIVE PROTEIN: CPT | Performed by: PREVENTIVE MEDICINE

## 2021-09-30 PROCEDURE — 99215 OFFICE O/P EST HI 40 MIN: CPT | Performed by: PREVENTIVE MEDICINE

## 2021-09-30 PROCEDURE — 76705 ECHO EXAM OF ABDOMEN: CPT | Mod: GC | Performed by: RADIOLOGY

## 2021-09-30 PROCEDURE — 99417 PROLNG OP E/M EACH 15 MIN: CPT | Performed by: PREVENTIVE MEDICINE

## 2021-09-30 PROCEDURE — 80053 COMPREHEN METABOLIC PANEL: CPT | Performed by: PREVENTIVE MEDICINE

## 2021-09-30 PROCEDURE — 85025 COMPLETE CBC W/AUTO DIFF WBC: CPT | Performed by: PREVENTIVE MEDICINE

## 2021-09-30 PROCEDURE — 71046 X-RAY EXAM CHEST 2 VIEWS: CPT | Performed by: RADIOLOGY

## 2021-09-30 PROCEDURE — 81001 URINALYSIS AUTO W/SCOPE: CPT | Performed by: PREVENTIVE MEDICINE

## 2021-09-30 PROCEDURE — 36415 COLL VENOUS BLD VENIPUNCTURE: CPT | Performed by: PREVENTIVE MEDICINE

## 2021-09-30 PROCEDURE — 83690 ASSAY OF LIPASE: CPT | Performed by: PREVENTIVE MEDICINE

## 2021-09-30 PROCEDURE — 99207 REFERRAL TO ACUTE AND DIAGNOSTIC SERVICES: CPT | Performed by: PHYSICIAN ASSISTANT

## 2021-09-30 ASSESSMENT — ENCOUNTER SYMPTOMS
SHORTNESS OF BREATH: 0
NEUROLOGICAL NEGATIVE: 1
CONSTITUTIONAL NEGATIVE: 1
DYSURIA: 0
CHEST TIGHTNESS: 1
COUGH: 1
DIARRHEA: 0
MUSCULOSKELETAL NEGATIVE: 1
NAUSEA: 0
FREQUENCY: 0
VOMITING: 0
FEVER: 0
MYALGIAS: 0
CARDIOVASCULAR NEGATIVE: 1
HEADACHES: 0
HEMATURIA: 0
CONSTIPATION: 0
WHEEZING: 0
CHILLS: 0
SORE THROAT: 0
PALPITATIONS: 0
ALLERGIC/IMMUNOLOGIC NEGATIVE: 1
ABDOMINAL PAIN: 1

## 2021-09-30 ASSESSMENT — PAIN SCALES - GENERAL: PAINLEVEL: SEVERE PAIN (6)

## 2021-09-30 NOTE — PROGRESS NOTES
Chief Complaint:     Chief Complaint   Patient presents with     Pain     pain under ribcage (right side) for about 3 weeks and got worse last week b/c the cough (deep cough), its more like pressure thightness feeling      Cough       Results for orders placed or performed in visit on 09/30/21   CBC with platelets and differential     Status: None ()    Narrative    The following orders were created for panel order CBC with platelets and differential.  Procedure                               Abnormality         Status                     ---------                               -----------         ------                     CBC with platelets and d...[[id number]]                                                   Please view results for these tests on the individual orders.   Results for orders placed or performed in visit on 09/30/21   XR Chest 2 Views     Status: None    Narrative    XR CHEST 2 VW 9/30/2021 2:55 PM    HISTORY: 2 weeks worsening R sided chest pain.; Chest pain,  unspecified type    COMPARISON: 10/12/2020      Impression    IMPRESSION: No infiltrate, pleural effusion or pneumothorax. Normal  heart size. Stable thoracic scoliosis.    ANAND HILL MD         SYSTEM ID:  [id number]       Medical Decision Making:    Vital signs reviewed by Ryan Fatima PA-C  BP (!) 138/93 (BP Location: Right arm, Patient Position: Sitting, Cuff Size: Adult Large)   Pulse 84   Temp 98.2  F (36.8  C) (Tympanic)   Resp 16   Wt 104.4 kg (230 lb 1.6 oz)   SpO2 97%   BMI 32.09 kg/m      Differential Diagnosis:  URI Adult/Peds:  Bronchitis-viral, Viral syndrome and Viral upper respiratory illness  Abdominal Pain: GB/Cholelithiasis, GERD/Ulcer, Non Specific and Viral Gastroenteritis  MS Injury Pain: sprain, muscle strain and contusion  Cardiac: Pleurisy  GERD  Panic/Anxiety        ASSESSMENT    1. Chest pain, unspecified type    2. RUQ abdominal pain        PLAN    Patient is in no acute distress. He is afebrile with  stable vital signs.  Temp is 98.2 in clinic today, lung sounds were clear, and O2 sats at 97% on RA.     CXR showed no infiltrate, pleural effusion or pneumothorax.  Normal heart size.  Stable thoracic scoliosis.  Patient has moderate pain with palpation in the RUQ.  Based on exam cannot rule out acute gallbladder  Patient instructed to go to the [facility] now for further evaluation, lab work, and imaging.  [facility] staff notified and handoff completed with [facility] provider.  Patient verbalized understanding and agreed with this plan.  He was discharged in stable condition.    Labs:    Results for orders placed or performed in visit on 09/30/21   CBC with platelets and differential     Status: None ()    Narrative    The following orders were created for panel order CBC with platelets and differential.  Procedure                               Abnormality         Status                     ---------                               -----------         ------                     CBC with platelets and d...[[id number]]                                                   Please view results for these tests on the individual orders.   Results for orders placed or performed in visit on 09/30/21   XR Chest 2 Views     Status: None    Narrative    XR CHEST 2 VW 9/30/2021 2:55 PM    HISTORY: 2 weeks worsening R sided chest pain.; Chest pain,  unspecified type    COMPARISON: 10/12/2020      Impression    IMPRESSION: No infiltrate, pleural effusion or pneumothorax. Normal  heart size. Stable thoracic scoliosis.    ANAND HILL MD         SYSTEM ID:  [id number]        Vital signs reviewed by Ryan Fatima PA-C  BP (!) 138/93 (BP Location: Right arm, Patient Position: Sitting, Cuff Size: Adult Large)   Pulse 84   Temp 98.2  F (36.8  C) (Tympanic)   Resp 16   Wt 104.4 kg (230 lb 1.6 oz)   SpO2 97%   BMI 32.09 kg/m      Current Meds      Current Outpatient Medications:      albuterol (PROAIR HFA/PROVENTIL HFA/VENTOLIN HFA) 108 (90 Base)  "MCG/ACT inhaler, Inhale 2 puffs into the lungs every 6 hours as needed for shortness of breath / dyspnea or wheezing, Disp: 1 Inhaler, Rfl: 0     dextromethorphan-guaiFENesin (MUCINEX DM)  MG 12 hr tablet, Take 1 tablet by mouth every 12 hours, Disp: , Rfl:      irbesartan (AVAPRO) 150 MG tablet, Take 1 tablet (150 mg) by mouth At Bedtime Needs follow up visit for any further refill., Disp: 30 tablet, Rfl: 0     LORazepam (ATIVAN) 0.5 MG tablet, Take 1 tablet (0.5 mg) by mouth 3 times daily as needed for anxiety (Patient not taking: Reported on 9/30/2021), Disp: 20 tablet, Rfl: 1      Respiratory History    occasional episodes of bronchitis, asthma, GERD      SUBJECTIVE    HPI: Carlos A Crespo is an 56 year old male who presents with a 3-week history of pain under his rib cage that gets worse with deep breathing.  He reports that he has had a cough \"deep cough\" for about a year now and he feels like he has having chest tightness.  He reports that he is been trying to figure out why he has this cough for about a year now.  Of note he has been vaccinated against COVID-19.  This pain that he is feeling underneath his ribs feels like a dull tightness feeling.  No jaw pain left arm pain sweating feelings of immense chest pressure.  Patient is eating and drinking well.  No fever, nausea, vomiting, or diarrhea.  He also reports that he has a h/o allergies and that some mornings wakes up w/ sinus congestion.  Additionally he does have a history of heartburn for which he takes omeprazole inconsistently    Patient denies any recent travel or exposure to known COVID positive tested individual.      ROS:     Review of Systems   Constitutional: Negative.  Negative for chills and fever.   HENT: Negative.  Negative for sore throat.    Respiratory: Positive for cough and chest tightness. Negative for shortness of breath and wheezing.    Cardiovascular: Negative.  Negative for chest pain and palpitations.   Gastrointestinal: " Positive for abdominal pain. Negative for constipation, diarrhea, nausea and vomiting.   Genitourinary: Negative for dysuria, frequency, hematuria and urgency.   Musculoskeletal: Negative.  Negative for myalgias.   Skin: Negative for rash.   Allergic/Immunologic: Negative.  Negative for immunocompromised state.   Neurological: Negative.  Negative for headaches.         Family History   Family History   Problem Relation Age of Onset     Hypertension Mother      Breast Cancer Mother      Diabetes Father      Hypertension Father      Pulmonary Embolism Father      Diabetes Maternal Uncle      Cerebrovascular Disease No family hx of      Coronary Artery Disease No family hx of         Problem history  Patient Active Problem List   Diagnosis     Scoliosis of thoracic spine     Hyperlipidemia LDL goal <100     Onychomycosis     Elevated blood-pressure reading without diagnosis of hypertension        Allergies  Allergies   Allergen Reactions     Influenza Vac Split [Flu Virus Vaccine]      Tetracycline      Ace Inhibitors Cough     Unsure as to which medication was used but probably Lisinopril. Does remember having a cough with one of the medications in the past.        Social History  Social History     Socioeconomic History     Marital status:      Spouse name: Tnaa     Number of children: 0     Years of education: Not on file     Highest education level: Not on file   Occupational History     Occupation: [employer] work.     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     Special Diet No     Back Care Yes     Comment: PT for ankle in past     Exercise Yes     Comment: Active at work Walks his dog 2 times at work     Bike Helmet No     Seat Belt Yes     Self-Exams No     Parent/sibling w/ CABG, MI or angioplasty before 65F 55M? No   Social History Narrative     Not on file     Social Determinants of Health     Financial Resource Strain:      Difficulty of Paying Living Expenses:    Food Insecurity:      Worried About Running Out of Food in the Last Year:      Ran Out of Food in the Last Year:    Transportation Needs:      Lack of Transportation (Medical):      Lack of Transportation (Non-Medical):    Physical Activity:      Days of Exercise per Week:      Minutes of Exercise per Session:    Stress:      Feeling of Stress :    Social Connections:      Frequency of Communication with Friends and Family:      Frequency of Social Gatherings with Friends and Family:      Attends Mandaen Services:      Active Member of Clubs or Organizations:      Attends Club or Organization Meetings:      Marital Status:    Intimate Partner Violence:      Fear of Current or Ex-Partner:      Emotionally Abused:      Physically Abused:      Sexually Abused:         OBJECTIVE     Vital signs reviewed by Ryan Fatima PA-C  BP (!) 138/93 (BP Location: Right arm, Patient Position: Sitting, Cuff Size: Adult Large)   Pulse 84   Temp 98.2  F (36.8  C) (Tympanic)   Resp 16   Wt 104.4 kg (230 lb 1.6 oz)   SpO2 97%   BMI 32.09 kg/m       Physical Exam  Vitals reviewed.   Constitutional:       General: He is not in acute distress.     Appearance: He is well-developed. He is not ill-appearing, toxic-appearing or diaphoretic.   HENT:      Head: Normocephalic and atraumatic.      Right Ear: Hearing, tympanic membrane, ear canal and external ear normal. No drainage,  swelling or tenderness. Tympanic membrane is not perforated, erythematous, retracted or bulging.      Left Ear: Hearing, tympanic membrane, ear canal and external ear normal. No drainage, swelling or tenderness. Tympanic membrane is not perforated, erythematous, retracted or bulging.      Nose: No nasal tenderness, mucosal edema, congestion or rhinorrhea.      Right Turbinates: Not enlarged or swollen.      Left Turbinates: Not enlarged or swollen.      Right Sinus: No maxillary sinus tenderness or frontal sinus tenderness.      Left Sinus: No maxillary sinus tenderness or frontal sinus tenderness.      Mouth/Throat:      Pharynx: No pharyngeal swelling, oropharyngeal exudate, posterior oropharyngeal erythema or uvula swelling.      Tonsils: No tonsillar exudate. 0 on the right. 0 on the left.   Eyes:      General: Lids are normal.         Right eye: No discharge.         Left eye: No discharge.      Conjunctiva/sclera: Conjunctivae normal.      Right eye: Right conjunctiva is not injected. No exudate.     Left eye: Left conjunctiva is not injected. No exudate.     Pupils: Pupils are equal, round, and reactive to light.   Cardiovascular:      Rate and Rhythm: Normal rate and regular rhythm.      Heart sounds: Normal heart sounds. No murmur heard.   No friction rub. No gallop.    Pulmonary:      Effort: Pulmonary effort is normal. No accessory muscle usage, respiratory distress or retractions.      Breath sounds: Normal breath sounds and air entry. No stridor, decreased air movement or transmitted upper airway sounds. No decreased breath sounds, wheezing, rhonchi or rales.      Comments: Lung sounds clear to auscultation bilaterally  Chest:      Chest wall: Tenderness present. No lacerations, deformity or swelling.   Abdominal:      General: Bowel sounds are normal. There is no distension.      Palpations: Abdomen is soft. Abdomen is not rigid. There is no mass.      Tenderness: There is abdominal tenderness in the  right upper quadrant and epigastric area. There is no right CVA tenderness, left CVA tenderness, guarding or rebound. Positive signs include Robles's sign. Negative signs include Rovsing's sign, McBurney's sign, psoas sign and obturator sign.          Comments: Patient had exquisite tenderness on Robles sign   Musculoskeletal:         General: Normal range of motion.      Cervical back: Normal range of motion and neck supple.   Lymphadenopathy:      Head:      Right side of head: No submental, submandibular, tonsillar, preauricular or posterior auricular adenopathy.      Left side of head: No submental, submandibular, tonsillar, preauricular or posterior auricular adenopathy.      Cervical:      Right cervical: No superficial or posterior cervical adenopathy.     Left cervical: No superficial or posterior cervical adenopathy.   Skin:     General: Skin is warm.      Capillary Refill: Capillary refill takes less than 2 seconds.   Neurological:      Mental Status: He is alert and oriented to person, place, and time.      Cranial Nerves: No cranial nerve deficit.      Sensory: No sensory deficit.      Motor: No abnormal muscle tone.      Coordination: Coordination normal.      Deep Tendon Reflexes: Reflexes normal.   Psychiatric:         Behavior: Behavior normal. Behavior is cooperative.         Thought Content: Thought content normal.         Judgment: Judgment normal.           Ryan Fatima PA-C  9/30/2021, 2:28 PM

## 2021-09-30 NOTE — PATIENT INSTRUCTIONS
Most consistent with abdominal wall pain.  Avoid ibuprofen.  Take acetaminophen (tylenol) 650 mg three times per day for at least one week.  Avoid heavy exercise and lifting for 2 weeks.  Heat and ice to area of pain.  Lidocare patch as well daily (over the counter).  Follow up if not improving in 2 weeks or sooner if you develop fever, vomiting, blood in stool or worsening pain.    Also there are a couple red blood cells in your urine.  I recommend having this rechecked in two weeks as well.

## 2021-10-01 VITALS
TEMPERATURE: 98.3 F | HEART RATE: 82 BPM | RESPIRATION RATE: 18 BRPM | SYSTOLIC BLOOD PRESSURE: 139 MMHG | OXYGEN SATURATION: 97 % | DIASTOLIC BLOOD PRESSURE: 75 MMHG

## 2021-10-21 ENCOUNTER — MYC REFILL (OUTPATIENT)
Dept: FAMILY MEDICINE | Facility: CLINIC | Age: 56
End: 2021-10-21

## 2021-10-21 DIAGNOSIS — I10 HYPERTENSION GOAL BP (BLOOD PRESSURE) < 140/90: ICD-10-CM

## 2021-10-22 RX ORDER — IRBESARTAN 300 MG/1
300 TABLET ORAL DAILY
Qty: 90 TABLET | Refills: 0 | Status: SHIPPED | OUTPATIENT
Start: 2021-10-22 | End: 2022-03-05

## 2021-10-22 NOTE — TELEPHONE ENCOUNTER
Routing refill request to provider for review/approval because:  Najma given x1 and patient did not follow up, please advise    Cyn Roberts RN  [facility]

## 2021-10-23 ENCOUNTER — HEALTH MAINTENANCE LETTER (OUTPATIENT)
Age: 56
End: 2021-10-23

## 2022-01-12 ENCOUNTER — OFFICE VISIT (OUTPATIENT)
Dept: URGENT CARE | Facility: URGENT CARE | Age: 57
End: 2022-01-12
Payer: COMMERCIAL

## 2022-01-12 VITALS
SYSTOLIC BLOOD PRESSURE: 145 MMHG | TEMPERATURE: 98.3 F | DIASTOLIC BLOOD PRESSURE: 97 MMHG | HEART RATE: 84 BPM | BODY MASS INDEX: 32.61 KG/M2 | WEIGHT: 233.8 LBS | OXYGEN SATURATION: 97 %

## 2022-01-12 DIAGNOSIS — J40 BRONCHITIS: Primary | ICD-10-CM

## 2022-01-12 PROCEDURE — 99213 OFFICE O/P EST LOW 20 MIN: CPT | Performed by: PHYSICIAN ASSISTANT

## 2022-01-12 PROCEDURE — 99000 SPECIMEN HANDLING OFFICE-LAB: CPT | Performed by: PHYSICIAN ASSISTANT

## 2022-01-12 PROCEDURE — U0005 INFEC AGEN DETEC AMPLI PROBE: HCPCS | Mod: 90 | Performed by: PHYSICIAN ASSISTANT

## 2022-01-12 PROCEDURE — U0003 INFECTIOUS AGENT DETECTION BY NUCLEIC ACID (DNA OR RNA); SEVERE ACUTE RESPIRATORY SYNDROME CORONAVIRUS 2 (SARS-COV-2) (CORONAVIRUS DISEASE [COVID-19]), AMPLIFIED PROBE TECHNIQUE, MAKING USE OF HIGH THROUGHPUT TECHNOLOGIES AS DESCRIBED BY CMS-2020-01-R: HCPCS | Mod: 90 | Performed by: PHYSICIAN ASSISTANT

## 2022-01-12 RX ORDER — METHYLPREDNISOLONE 4 MG
TABLET, DOSE PACK ORAL
Qty: 21 TABLET | Refills: 0 | Status: SHIPPED | OUTPATIENT
Start: 2022-01-12 | End: 2022-01-17

## 2022-01-13 NOTE — PATIENT INSTRUCTIONS
Patient Education     Viral or Bacterial Bronchitis with Wheezing (Adult)    Bronchitis is an infection of the air passages. It often occurs during a cold and is usually caused by a virus. Symptoms include cough with mucus (phlegm) and low-grade fever. This illness is contagious during the first few days and is spread through the air by coughing and sneezing, or by direct contact (touching the sick person and then touching your own eyes, nose, or mouth).  If there is a lot of inflammation, air flow is restricted. The air passages may also go into spasm, especially if you have asthma. This causes wheezing and difficulty breathing even in people who do not have asthma.  Bronchitis usually lasts 7 to 14 days. The wheezing should improve with treatment during the first week. An inhaler is often prescribed to relax the air passages and stop wheezing. Antibiotics will be prescribed if your doctor thinks there is also a secondary bacterial infection.  Home care    If symptoms are severe, rest at home for the first 2 to 3 days. When you go back to your usual activities, don't let yourself get too tired.    Dont s'moke. Also avoid being exposed to secondhand smoke.    You may use over-the-counter medicine to control fever or pain, unless another medicine was prescribed. Note: If you have chronic liver or kidney disease or have ever had a stomach ulcer or gastrointestinal bleeding, talk with your healthcare provider before using these medicines. Also talk to your provider if you are taking medicine to prevent blood clots.) Aspirin should never be given to anyone younger than 18 years of age who is ill with a viral infection or fever. It may cause severe liver or brain damage.    Your appetite may be poor, so a light diet is fine. Stay well hydrated by drinking 6 to 8 glasses of fluids per day (such as water, soft drinks, sports drinks, juices, tea, or soup). Extra fluids will help loosen secretions in the nose and  lungs.    Over-the-counter cough, cold, and sore-throat medicines will not shorten the length of the illness, but they may be helpful to reduce symptoms. (Note: Don't use decongestants if you have high blood pressure.)    If you were given an inhaler, use it exactly as directed. If you need to use it more often than prescribed, your condition may be worsening. If this happens, contact your healthcare provider.    If prescribed, finish all antibiotic medicine, even if you are feeling better after only a few days.  Follow-up care  Follow up with your healthcare provider, or as advised. If you had an X-ray or ECG (electrocardiogram), a specialist will review it. You will be notified of any new findings that may affect your care.  If you are age 65 or older, or if you have a chronic lung disease or condition that affects your immune system, or you smoke, ask your healthcare provider about getting a pneumococcal vaccine and a yearly flu shot (influenza vaccine).  When to seek medical advice  Call your healthcare provider right away if any of these occur:    Fever of 100.4 F (38 C) or higher, or as directed by your healthcare provider    Coughing up increasing amounts of colored sputum    Weakness, drowsiness, headache, facial pain, ear pain, or a stiff neck  Call 911  Call 911 if any of these occur.    Coughing up blood    Worsening weakness, drowsiness, headache, or stiff neck    Increased wheezing not helped with medication, shortness of breath, or pain with breathing  [employer] last reviewed this educational content on 6/1/2018 2000-2021 [employer]. All rights reserved. This information is not intended as a substitute for professional medical care. Always follow your healthcare professional's instructions.

## 2022-01-13 NOTE — PROGRESS NOTES
Bronchitis  - Symptomatic; Yes COVID-19 Virus (Coronavirus) by PCR Nose  - methylPREDNISolone (MEDROL DOSEPAK) 4 MG tablet therapy pack; Follow Package Directions  - amoxicillin-clavulanate (AUGMENTIN) 875-125 MG tablet; Take 1 tablet by mouth 2 times daily for 10 days     See Patient Instructions  Patient Instructions     Patient Education     Viral or Bacterial Bronchitis with Wheezing (Adult)    Bronchitis is an infection of the air passages. It often occurs during a cold and is usually caused by a virus. Symptoms include cough with mucus (phlegm) and low-grade fever. This illness is contagious during the first few days and is spread through the air by coughing and sneezing, or by direct contact (touching the sick person and then touching your own eyes, nose, or mouth).  If there is a lot of inflammation, air flow is restricted. The air passages may also go into spasm, especially if you have asthma. This causes wheezing and difficulty breathing even in people who do not have asthma.  Bronchitis usually lasts 7 to 14 days. The wheezing should improve with treatment during the first week. An inhaler is often prescribed to relax the air passages and stop wheezing. Antibiotics will be prescribed if your doctor thinks there is also a secondary bacterial infection.  Home care    If symptoms are severe, rest at home for the first 2 to 3 days. When you go back to your usual activities, don't let yourself get too tired.    Dont s'moke. Also avoid being exposed to secondhand smoke.    You may use over-the-counter medicine to control fever or pain, unless another medicine was prescribed. Note: If you have chronic liver or kidney disease or have ever had a stomach ulcer or gastrointestinal bleeding, talk with your healthcare provider before using these medicines. Also talk to your provider if you are taking medicine to prevent blood clots.) Aspirin should never be given to anyone younger than 18 years of age who is ill  with a viral infection or fever. It may cause severe liver or brain damage.    Your appetite may be poor, so a light diet is fine. Stay well hydrated by drinking 6 to 8 glasses of fluids per day (such as water, soft drinks, sports drinks, juices, tea, or soup). Extra fluids will help loosen secretions in the nose and lungs.    Over-the-counter cough, cold, and sore-throat medicines will not shorten the length of the illness, but they may be helpful to reduce symptoms. (Note: Don't use decongestants if you have high blood pressure.)    If you were given an inhaler, use it exactly as directed. If you need to use it more often than prescribed, your condition may be worsening. If this happens, contact your healthcare provider.    If prescribed, finish all antibiotic medicine, even if you are feeling better after only a few days.  Follow-up care  Follow up with your healthcare provider, or as advised. If you had an X-ray or ECG (electrocardiogram), a specialist will review it. You will be notified of any new findings that may affect your care.  If you are age 65 or older, or if you have a chronic lung disease or condition that affects your immune system, or you smoke, ask your healthcare provider about getting a pneumococcal vaccine and a yearly flu shot (influenza vaccine).  When to seek medical advice  Call your healthcare provider right away if any of these occur:    Fever of 100.4 F (38 C) or higher, or as directed by your healthcare provider    Coughing up increasing amounts of colored sputum    Weakness, drowsiness, headache, facial pain, ear pain, or a stiff neck  Call 911  Call 911 if any of these occur.    Coughing up blood    Worsening weakness, drowsiness, headache, or stiff neck    Increased wheezing not helped with medication, shortness of breath, or pain with breathing  [employer] last reviewed this educational content on 6/1/2018 2000-2021 [employer]. All rights reserved. This information  is not intended as a substitute for professional medical care. Always follow your healthcare professional's instructions.               [facility]    Subjective   56 year old who presents to clinic today for the following health issues:    Cough       HPI     Acute Illness  Acute illness concerns: for a couple of weeks, started as a cold. Getting pain on right side of ribs. Deep cough, unable to cough anything up.  Onset/Duration: Couple of weeks   Symptoms:  Fever: YES- Not currently   Chills/Sweats: YES  Headache (location?): YES  Sinus Pressure: Pressure  Conjunctivitis:  no  Ear Pain: no  Rhinorrhea: YES  Congestion: YES   Sore Throat: no  Cough: YES  Wheeze: YES  Decreased Appetite: no  Nausea: no  Vomiting: no  Diarrhea: no  Dysuria/Freq.: no  Dysuria or Hematuria: no  Fatigue/Achiness: YES- Not currently   Sick/Strep Exposure: no  Therapies tried and outcome: None    Review of Systems   Review of Systems   See HPI     Objective    Temp: 98.3  F (36.8  C) Temp src: Oral BP: (!) 145/97 Pulse: 84     SpO2: 97 %       Physical Exam   Physical Exam  Constitutional:       General: He is not in acute distress.     Appearance: Normal appearance. He is normal weight. He is not ill-appearing, toxic-appearing or diaphoretic.   HENT:      Head: Normocephalic and atraumatic.      Right Ear: Tympanic membrane, ear canal and external ear normal. There is no impacted cerumen.      Left Ear: Tympanic membrane, ear canal and external ear normal. There is no impacted cerumen.      Nose: Nose normal. No congestion or rhinorrhea.      Mouth/Throat:      Mouth: Mucous membranes are moist.      Pharynx: Oropharynx is clear. No oropharyngeal exudate or posterior oropharyngeal erythema.   Cardiovascular:      Rate and Rhythm: Normal rate and regular rhythm.      Pulses: Normal pulses.      Heart sounds: Normal heart sounds. No murmur heard.  No friction rub. No gallop.    Pulmonary:       Effort: No respiratory distress.      Breath sounds: No stridor. Wheezing present. No rhonchi or rales.   Chest:      Chest wall: No tenderness.   Neurological:      General: No focal deficit present.      Mental Status: He is alert and oriented to person, place, and time. Mental status is at baseline.      Gait: Gait normal.   Psychiatric:         Mood and Affect: Mood normal.         Behavior: Behavior normal.         Thought Content: Thought content normal.         Judgment: Judgment normal.          No results found for this or any previous visit (from the past 24 hour(s)).

## 2022-01-14 ENCOUNTER — TELEPHONE (OUTPATIENT)
Dept: NURSING | Facility: CLINIC | Age: 57
End: 2022-01-14
Payer: COMMERCIAL

## 2022-01-14 LAB — SARS-COV-2 RNA RESP QL NAA+PROBE: NOT DETECTED

## 2022-01-16 ENCOUNTER — NURSE TRIAGE (OUTPATIENT)
Dept: NURSING | Facility: CLINIC | Age: 57
End: 2022-01-16
Payer: COMMERCIAL

## 2022-01-17 ENCOUNTER — ANCILLARY PROCEDURE (OUTPATIENT)
Dept: GENERAL RADIOLOGY | Facility: CLINIC | Age: 57
End: 2022-01-17
Attending: FAMILY MEDICINE
Payer: COMMERCIAL

## 2022-01-17 ENCOUNTER — OFFICE VISIT (OUTPATIENT)
Dept: FAMILY MEDICINE | Facility: CLINIC | Age: 57
End: 2022-01-17
Payer: COMMERCIAL

## 2022-01-17 VITALS
WEIGHT: 229.1 LBS | OXYGEN SATURATION: 99 % | TEMPERATURE: 97.9 F | RESPIRATION RATE: 18 BRPM | SYSTOLIC BLOOD PRESSURE: 146 MMHG | HEART RATE: 93 BPM | BODY MASS INDEX: 31.95 KG/M2 | DIASTOLIC BLOOD PRESSURE: 99 MMHG

## 2022-01-17 DIAGNOSIS — R09.1 PLEURISY: ICD-10-CM

## 2022-01-17 DIAGNOSIS — R07.81 RIB PAIN ON RIGHT SIDE: ICD-10-CM

## 2022-01-17 DIAGNOSIS — R05.9 COUGH: ICD-10-CM

## 2022-01-17 DIAGNOSIS — S22.41XA CLOSED FRACTURE OF MULTIPLE RIBS OF RIGHT SIDE, INITIAL ENCOUNTER: ICD-10-CM

## 2022-01-17 DIAGNOSIS — I10 HYPERTENSION GOAL BP (BLOOD PRESSURE) < 140/90: ICD-10-CM

## 2022-01-17 DIAGNOSIS — R05.9 COUGH: Primary | ICD-10-CM

## 2022-01-17 LAB
BASOPHILS # BLD AUTO: 0 10E3/UL (ref 0–0.2)
BASOPHILS NFR BLD AUTO: 0 %
EOSINOPHIL # BLD AUTO: 0.1 10E3/UL (ref 0–0.7)
EOSINOPHIL NFR BLD AUTO: 1 %
ERYTHROCYTE [DISTWIDTH] IN BLOOD BY AUTOMATED COUNT: 14.2 % (ref 10–15)
HCT VFR BLD AUTO: 43.9 % (ref 40–53)
HGB BLD-MCNC: 13.8 G/DL (ref 13.3–17.7)
LYMPHOCYTES # BLD AUTO: 3.6 10E3/UL (ref 0.8–5.3)
LYMPHOCYTES NFR BLD AUTO: 29 %
MCH RBC QN AUTO: 30.1 PG (ref 26.5–33)
MCHC RBC AUTO-ENTMCNC: 31.4 G/DL (ref 31.5–36.5)
MCV RBC AUTO: 96 FL (ref 78–100)
MONOCYTES # BLD AUTO: 1.1 10E3/UL (ref 0–1.3)
MONOCYTES NFR BLD AUTO: 9 %
NEUTROPHILS # BLD AUTO: 7.5 10E3/UL (ref 1.6–8.3)
NEUTROPHILS NFR BLD AUTO: 61 %
PLATELET # BLD AUTO: 351 10E3/UL (ref 150–450)
RBC # BLD AUTO: 4.58 10E6/UL (ref 4.4–5.9)
WBC # BLD AUTO: 12.2 10E3/UL (ref 4–11)

## 2022-01-17 PROCEDURE — 71101 X-RAY EXAM UNILAT RIBS/CHEST: CPT | Mod: RT | Performed by: RADIOLOGY

## 2022-01-17 PROCEDURE — 36415 COLL VENOUS BLD VENIPUNCTURE: CPT | Performed by: FAMILY MEDICINE

## 2022-01-17 PROCEDURE — 99214 OFFICE O/P EST MOD 30 MIN: CPT | Performed by: FAMILY MEDICINE

## 2022-01-17 PROCEDURE — 85025 COMPLETE CBC W/AUTO DIFF WBC: CPT | Performed by: FAMILY MEDICINE

## 2022-01-17 RX ORDER — CODEINE PHOSPHATE AND GUAIFENESIN 10; 100 MG/5ML; MG/5ML
1 SOLUTION ORAL EVERY 6 HOURS PRN
Qty: 120 ML | Refills: 0 | Status: SHIPPED | OUTPATIENT
Start: 2022-01-17 | End: 2022-07-01

## 2022-01-17 RX ORDER — NAPROXEN 500 MG/1
500 TABLET ORAL 2 TIMES DAILY PRN
Qty: 30 TABLET | Refills: 0 | Status: SHIPPED | OUTPATIENT
Start: 2022-01-17 | End: 2022-07-01

## 2022-01-17 ASSESSMENT — PAIN SCALES - GENERAL: PAINLEVEL: SEVERE PAIN (7)

## 2022-01-17 NOTE — RESULT ENCOUNTER NOTE
Miguel Strauss,  Your x-ray showed no concern for pneumonia but nondisplaced fracture of the right lower ribs, explaining your symptoms of rib pain.  I would recommend to stop taking the Mucinex.  I have sent a prescription for Robitussin codeine cough syrup to use up to 4 times a day as needed for the cough.  I have also sent a prescription for naproxen to take twice a day as needed for pain.  I would recommend you to follow-up with  in 2 weeks for recheck on this and high blood pressure or sooner if needed.   Let me know if you have any questions. Take care.  Son Russo MD

## 2022-01-17 NOTE — RESULT ENCOUNTER NOTE
Miguel Strauss,  Your lab test showed slightly elevated white count, likely from recent Medrol Dosepak use.  But your neutrophils are in normal range, this is reassuring.   Let me know if you have any questions. Take care.  Son Russo MD

## 2022-01-17 NOTE — TELEPHONE ENCOUNTER
Side pain, bronchitis , 6 day tablet of prednisone, and ogment,      Chest pain is intenfied, couses, 7/10, when I cough , or get up and movent , I feel something is moving, swollen up or inflamed, the other side feels good     Pt called stating he was diagnosed with bronchitis and he is taking prednisone. Pt reported he has chest pain rated 7/10, when he coughs or moves and thinks his right side is inflamed or swollen.       Per protocal pt was advised to go to the ER to be seen and he stated okay.      Rigoberto King RN  [facility]     COVID 19 Nurse Triage Plan/Patient Instructions    Please be aware that novel coronavirus (COVID-19) may be circulating in the community. If you develop symptoms such as fever, cough, or SOB or if you have concerns about the presence of another infection including coronavirus (COVID-19), please contact your health care provider or visit [email or web address].     Disposition/Instructions    ED Visit recommended. Follow protocol based instructions.     Bring Your Own Device:  Please also bring your smart device(s) (smart phones, tablets, laptops) and their charging cables for your personal use and to communicate with your care team during your visit.    Thank you for taking steps to prevent the spread of this virus.  o Limit your contact with others.  o Wear a simple mask to cover your cough.  o Wash your hands well and often.    Resources    [facility]: About COVID-19: [email or web address]    CDC: What to Do If You're Sick: www.cdc.gov/coronavirus/2019-ncov/about/steps-when-sick.html    CDC: Ending Home Isolation: www.cdc.gov/coronavirus/2019-ncov/hcp/disposition-in-home-patients.html     CDC: Caring for Someone: www.cdc.gov/coronavirus/2019-ncov/if-you-are-sick/care-for-someone.html     LISA: Interim Guidance for Hospital Discharge to Home: www.health.[facility].[place]/diseases/coronavirus/hcp/hospdischarge.pdf    [facility]  clinical trials (COVID-19 research studies): [email or web address]     Below are the COVID-19 hotlines at the [place] Department of [facility] ([facility]). Interpreters are available.   o For health questions: Call 065-598-9742 or 1-190.655.5042 (7 a.m. to 7 p.m.)  o For questions about schools and childcare: Call 594-240-7160 or 1-972.345.7519 (7 a.m. to 7 p.m.)             Reason for Disposition    SEVERE chest pain    Additional Information    Negative: Severe difficulty breathing (e.g., struggling for each breath, speaks in single words)    Negative: Difficult to awaken or acting confused (e.g., disoriented, slurred speech)    Negative: Shock suspected (e.g., cold/pale/clammy skin, too weak to stand, low BP, rapid pulse)    Negative: [1] Chest pain lasts > 5 minutes AND [2] history of heart disease  (i.e., heart attack, bypass surgery, angina, angioplasty, CHF; not just a heart murmur)    Negative: [1] Chest pain lasts > 5 minutes AND [2] described as crushing, pressure-like, or heavy    Negative: [1] Chest pain lasts > 5 minutes AND [2] age > 50    Negative: [1] Chest pain lasts > 5 minutes AND [2] age > 30 AND [3] at least one cardiac risk factor (i.e., hypertension, diabetes, obesity, smoker or strong family history of heart disease)    Negative: [1] Chest pain lasts > 5 minutes AND [2] not relieved with nitroglycerin    Negative: Passed out (i.e., lost consciousness, collapsed and was not responding)    Negative: Heart beating < 50 beats per minute OR > 140 beats per minute    Negative: Visible sweat on face or sweat dripping down face    Negative: Sounds like a life-threatening emergency to the triager    Negative: Followed a chest injury    Protocols used: CHEST PAIN-A-AH

## 2022-01-17 NOTE — PROGRESS NOTES
Assessment & Plan     Cough  ddx-viral URI/bronchitis  Patient is under treatment with Augmentin, prescribed at the urgent care on 1/14/2022  Due to concern for pleurisy and possible rib fracture from cough, recommended chest x-ray to evaluate this further  - CBC with platelets and differential; Future  - XR Ribs & Chest Right G/E 3 Views; Future  - CBC with platelets and differential  - guaiFENesin-codeine (ROBITUSSIN AC) 100-10 MG/5ML solution; Take 5 mLs by mouth every 6 hours as needed for cough    Rib pain on right side  ddx-rib fracture from recent coughing  Results for orders placed or performed in visit on 01/17/22   XR Ribs & Chest Right G/E 3 Views     Status: None    Narrative    RIBS AND CHEST RIGHT THREE VIEWS January 17, 2022 1:10 PM     HISTORY: Cough. Right chest wall pain.    COMPARISON: Chest x-ray from 9/30/2021.      Impression    IMPRESSION: Stable left convex upper thoracic scoliosis. Heart size is  normal. Lungs are clear. No pneumothorax or pleural fluid.    Nondisplaced fractures of the right seventh through ninth ribs.    CARLA MUNOZ MD         SYSTEM ID:  [id number]   Results for orders placed or performed in visit on 01/17/22   CBC with platelets and differential     Status: Abnormal   Result Value Ref Range    WBC Count 12.2 (H) 4.0 - 11.0 10e3/uL    RBC Count 4.58 4.40 - 5.90 10e6/uL    Hemoglobin 13.8 13.3 - 17.7 g/dL    Hematocrit 43.9 40.0 - 53.0 %    MCV 96 78 - 100 fL    MCH 30.1 26.5 - 33.0 pg    MCHC 31.4 (L) 31.5 - 36.5 g/dL    RDW 14.2 10.0 - 15.0 %    Platelet Count 351 150 - 450 10e3/uL    % Neutrophils 61 %    % Lymphocytes 29 %    % Monocytes 9 %    % Eosinophils 1 %    % Basophils 0 %    Absolute Neutrophils 7.5 1.6 - 8.3 10e3/uL    Absolute Lymphocytes 3.6 0.8 - 5.3 10e3/uL    Absolute Monocytes 1.1 0.0 - 1.3 10e3/uL    Absolute Eosinophils 0.1 0.0 - 0.7 10e3/uL    Absolute Basophils 0.0 0.0 - 0.2 10e3/uL   CBC with platelets and differential     Status: Abnormal     Narrative    The following orders were created for panel order CBC with platelets and differential.  Procedure                               Abnormality         Status                     ---------                               -----------         ------                     CBC with platelets and d...[[id number]]  Abnormal            Final result                 Please view results for these tests on the individual orders.     Reviewed chest and rib x-ray showing no concern for pneumonias, pleural effusion but nondisplaced fractures of the right lower ribs explaining his symptoms  Will recommend to stop using Mucinex, prescription given for Robitussin codeine cough syrup to use daily as needed for cough  Will take naproxen 500 mg twice a day as needed for pain  We will follow-up with PCP, Dr. Upton in 2 weeks for recheck or sooner if needed  Can use local ice and heat as needed, avoid triggering activities  - XR Ribs & Chest Right G/E 3 Views; Future  - guaiFENesin-codeine (ROBITUSSIN AC) 100-10 MG/5ML solution; Take 5 mLs by mouth every 6 hours as needed for cough  - naproxen (NAPROSYN) 500 MG tablet; Take 1 tablet (500 mg) by mouth 2 times daily as needed for moderate pain    Closed fracture of multiple ribs of right side, initial encounter  as above    - guaiFENesin-codeine (ROBITUSSIN AC) 100-10 MG/5ML solution; Take 5 mLs by mouth every 6 hours as needed for cough  - naproxen (NAPROSYN) 500 MG tablet; Take 1 tablet (500 mg) by mouth 2 times daily as needed for moderate pain    Hypertension goal BP (blood pressure) < 140/90  BP Readings from Last 6 Encounters:   01/17/22 (!) 146/99   01/12/22 (!) 145/97   09/30/21 139/75   09/30/21 (!) 138/93   01/19/21 (!) 146/91   10/17/20 (!) 144/82     Reviewed ongoing moderately elevated blood pressures  Patient reported taking Avapro 150 mg dose he was given prescription for 300 mg due to concern for dizziness, when he was taking the 300  Recommended patient to take to  150 mg twice a day, follow-up with PCP in 2 weeks for recheck  Patient verbalised understanding and is agreeable to the plan.        Review of the result(s) of each unique test - CBC with differential, chest and rib x-ray         Chart documentation done in part with Dragon Voice recognition Software. Although reviewed after completion, some word and grammatical error may remain.    See Patient Instructions    Return in about 2 weeks (around 1/31/2022), or if symptoms worsen or fail to improve, for follow up.    Son Russo MD  [facility] REBECCA Strauss is a 56 year old who presents for the following health issues     HPI     ED/UC Followup:  Patient is new to the provider, is here today for follow-up on his recent urgent care visit on January 12 for cough and upper respiratory infection  In the urgent care, he was diagnosed with acute bronchitis, was given prescription for Augmentin, Mucinex and albuterol inhaler  Patient reported having moderate to severe pain in the right lower ribs that radiates to the right mid back, worse when taking deep breathing and coughing for the past 3 to 4 days  Denies hemoptysis, previous history of pneumonia, left-sided symptoms  Denies previous history of rib fractures  He was tested negative for COVID at the time of urgent care visit  Patient is vaccinated against COVID but has not received the booster dose yet  He denies history of asthma, allergies, smoking  Denies recent history of travel, sick contact exposure  Past medical history is significant for hypertension, taking Avapro 150 mg once daily instead of 300 mg due to concern for dizziness when he was taking the prescribed dose of 300 mg      Facility:  [facility]  Date of visit: 01/12/22  Reason for visit: URI  Current Status: has worsened, when coughing there is a sharp pain RT rib area that Radiates to RT side back           Review of Systems   CONSTITUTIONAL: NEGATIVE for fever, chills,  change in weight  INTEGUMENTARY/SKIN: NEGATIVE for worrisome rashes, moles or lesions  EYES: NEGATIVE for vision changes or irritation  ENT/MOUTH: NEGATIVE for ear, mouth and throat problems  RESP:as above  CV: NEGATIVE for chest pain, palpitations or peripheral edema  CV: History of hypertension  GI: NEGATIVE for nausea, abdominal pain, heartburn, or change in bowel habits  MUSCULOSKELETAL: as above  NEURO: NEGATIVE for weakness, dizziness or paresthesias  PSYCHIATRIC: NEGATIVE for changes in mood or affect      Objective    BP (!) 146/99 (BP Location: Right arm, Patient Position: Sitting, Cuff Size: Adult Large)   Pulse 93   Temp 97.9  F (36.6  C) (Oral)   Resp 18   Wt 103.9 kg (229 lb 1.6 oz)   SpO2 99%   BMI 31.95 kg/m    Body mass index is 31.95 kg/m .  Physical Exam   GENERAL: healthy, alert and no distress  EYES: Eyes grossly normal to inspection  HENT: normal cephalic/atraumatic, ear canals and TM's normal, nose and mouth without ulcers or lesions, oropharynx clear and oral mucous membranes moist  NECK: no adenopathy, no asymmetry, masses, or scars and thyroid normal to palpation  RESP: lungs clear to auscultation - no rales, rhonchi or wheezes  CV: regular rate and rhythm, normal S1 S2, no S3 or S4, no murmur, click or rub, no peripheral edema and peripheral pulses strong  ABDOMEN: soft, nontender, no hepatosplenomegaly, no masses and bowel sounds normal  MS: Moderate to severe tenderness over the right lower ribs both anterior and posterior aspects  SKIN: no suspicious lesions or rashes  PSYCH: mentation appears normal, affect normal/bright    Lab Results   Component Value Date    WBC 12.2 01/17/2022    WBC 5.8 02/25/2011     Lab Results   Component Value Date    RBC 4.58 01/17/2022    RBC 4.40 02/25/2011     Lab Results   Component Value Date    HGB 13.8 01/17/2022    HGB 13.9 02/25/2011     Lab Results   Component Value Date    HCT 43.9 01/17/2022    HCT 41.0 02/25/2011     No components found for:  MCT  Lab Results   Component Value Date    MCV 96 01/17/2022    MCV 93 02/25/2011     Lab Results   Component Value Date    MCH 30.1 01/17/2022    MCH 31.6 02/25/2011     Lab Results   Component Value Date    MCHC 31.4 01/17/2022    MCHC 33.9 02/25/2011     Lab Results   Component Value Date    RDW 14.2 01/17/2022    RDW 13.5 02/25/2011     Lab Results   Component Value Date     01/17/2022     02/25/2011     Recent Results (from the past 24 hour(s))   XR Ribs & Chest Right G/E 3 Views    Narrative    RIBS AND CHEST RIGHT THREE VIEWS January 17, 2022 1:10 PM     HISTORY: Cough. Right chest wall pain.    COMPARISON: Chest x-ray from 9/30/2021.      Impression    IMPRESSION: Stable left convex upper thoracic scoliosis. Heart size is  normal. Lungs are clear. No pneumothorax or pleural fluid.    Nondisplaced fractures of the right seventh through ninth ribs.    CARLA MUNOZ MD         SYSTEM ID:  [id number]         Chart forwarded to PCP for FYI

## 2022-02-12 ENCOUNTER — HEALTH MAINTENANCE LETTER (OUTPATIENT)
Age: 57
End: 2022-02-12

## 2022-03-05 ENCOUNTER — MYC REFILL (OUTPATIENT)
Dept: FAMILY MEDICINE | Facility: CLINIC | Age: 57
End: 2022-03-05
Payer: COMMERCIAL

## 2022-03-05 DIAGNOSIS — I10 HYPERTENSION GOAL BP (BLOOD PRESSURE) < 140/90: ICD-10-CM

## 2022-03-08 RX ORDER — IRBESARTAN 300 MG/1
300 TABLET ORAL DAILY
Qty: 90 TABLET | Refills: 0 | Status: SHIPPED | OUTPATIENT
Start: 2022-03-08 | End: 2022-05-15

## 2022-03-08 NOTE — TELEPHONE ENCOUNTER
"Routing refill request to provider for review/approval because:  BP Readings from Last 3 Encounters:   01/17/22 (!) 146/99   01/12/22 (!) 145/97   09/30/21 139/75     Requested Prescriptions   Pending Prescriptions Disp Refills    irbesartan (AVAPRO) 300 MG tablet 90 tablet 0     Sig: Take 1 tablet (300 mg) by mouth daily        Angiotensin-II Receptors Failed - 3/5/2022 11:42 PM        Failed - Last blood pressure under 140/90 in past 12 months       BP Readings from Last 3 Encounters:   01/17/22 (!) 146/99   01/12/22 (!) 145/97   09/30/21 139/75                 Passed - Recent (12 mo) or future (30 days) visit within the authorizing provider's specialty     Patient has had an office visit with the authorizing provider or a provider within the authorizing providers department within the previous 12 mos or has a future within next 30 days. See \"Patient Info\" tab in inbasket, or \"Choose Columns\" in Meds & Orders section of the refill encounter.              Passed - Medication is active on med list        Passed - Patient is age 18 or older        Passed - Normal serum creatinine on file in past 12 months     Recent Labs   Lab Test 09/30/21  1607   CR 0.94       Ok to refill medication if creatinine is low          Passed - Normal serum potassium on file in past 12 months       Recent Labs   Lab Test 09/30/21  1607   POTASSIUM 3.8                        Megan Jean-Baptiste RN, BSN  [facility]        "

## 2022-03-09 NOTE — TELEPHONE ENCOUNTER
Refilled x 1.  Needs visit (OFV or video visit) before any further refill.     I have not seen him in a couple yrs - needs BP follow up

## 2022-04-17 ENCOUNTER — MYC MEDICAL ADVICE (OUTPATIENT)
Dept: FAMILY MEDICINE | Facility: CLINIC | Age: 57
End: 2022-04-17
Payer: COMMERCIAL

## 2022-05-15 ENCOUNTER — MYC REFILL (OUTPATIENT)
Dept: FAMILY MEDICINE | Facility: CLINIC | Age: 57
End: 2022-05-15
Payer: COMMERCIAL

## 2022-05-15 DIAGNOSIS — I10 HYPERTENSION GOAL BP (BLOOD PRESSURE) < 140/90: ICD-10-CM

## 2022-05-18 RX ORDER — IRBESARTAN 300 MG/1
300 TABLET ORAL DAILY
Qty: 30 TABLET | Refills: 0 | Status: SHIPPED | OUTPATIENT
Start: 2022-05-18 | End: 2022-07-01

## 2022-05-18 NOTE — TELEPHONE ENCOUNTER
"Routing refill request to provider for review/approval because:  BP Readings from Last 3 Encounters:   01/17/22 (!) 146/99   01/12/22 (!) 145/97   09/30/21 139/75     Requested Prescriptions   Pending Prescriptions Disp Refills    irbesartan (AVAPRO) 300 MG tablet 90 tablet 0     Sig: Take 1 tablet (300 mg) by mouth daily Needs follow up visit for any further refill.        Angiotensin-II Receptors Failed - 5/15/2022 12:12 AM        Failed - Last blood pressure under 140/90 in past 12 months       BP Readings from Last 3 Encounters:   01/17/22 (!) 146/99   01/12/22 (!) 145/97   09/30/21 139/75                 Passed - Recent (12 mo) or future (30 days) visit within the authorizing provider's specialty     Patient has had an office visit with the authorizing provider or a provider within the authorizing providers department within the previous 12 mos or has a future within next 30 days. See \"Patient Info\" tab in inbasket, or \"Choose Columns\" in Meds & Orders section of the refill encounter.              Passed - Medication is active on med list        Passed - Patient is age 18 or older        Passed - Normal serum creatinine on file in past 12 months     Recent Labs   Lab Test 09/30/21  1607   CR 0.94       Ok to refill medication if creatinine is low          Passed - Normal serum potassium on file in past 12 months       Recent Labs   Lab Test 09/30/21  1607   POTASSIUM 3.8                        Megan Jean-Baptiste RN, BSN  [facility]        "

## 2022-07-01 ENCOUNTER — TELEPHONE (OUTPATIENT)
Dept: FAMILY MEDICINE | Facility: CLINIC | Age: 57
End: 2022-07-01

## 2022-07-01 ENCOUNTER — VIRTUAL VISIT (OUTPATIENT)
Dept: FAMILY MEDICINE | Facility: CLINIC | Age: 57
End: 2022-07-01
Payer: COMMERCIAL

## 2022-07-01 DIAGNOSIS — Z13.220 SCREENING CHOLESTEROL LEVEL: ICD-10-CM

## 2022-07-01 DIAGNOSIS — Z12.11 COLON CANCER SCREENING: ICD-10-CM

## 2022-07-01 DIAGNOSIS — R05.9 COUGH: ICD-10-CM

## 2022-07-01 DIAGNOSIS — I10 HYPERTENSION GOAL BP (BLOOD PRESSURE) < 140/90: Primary | ICD-10-CM

## 2022-07-01 PROCEDURE — 99214 OFFICE O/P EST MOD 30 MIN: CPT | Mod: 95 | Performed by: FAMILY MEDICINE

## 2022-07-01 RX ORDER — IRBESARTAN 300 MG/1
300 TABLET ORAL DAILY
Qty: 90 TABLET | Refills: 1 | Status: SHIPPED | OUTPATIENT
Start: 2022-07-01 | End: 2022-09-30

## 2022-07-01 NOTE — PATIENT INSTRUCTIONS
Chronic cough can relate to either drainage from above (allergies, post-nasal drip) or acid reflux from below.  Try either:    - claritin or zyrtec (generic OK)   Or  - prilosec, zantac, or pepcid nightly    2 week trial - if no change, try the other.

## 2022-07-01 NOTE — PROGRESS NOTES
Carlos A is a 56 year old who is being evaluated via a billable video visit.      How would you like to obtain your AVS? MyChart  If the video visit is dropped, the invitation should be resent by: Text to cell phone: 1  Will anyone else be joining your video visit? No          Assessment & Plan     Hypertension goal BP (blood pressure) < 140/90  Home BP readings well controlled.  Due for lab work this fall.  - irbesartan (AVAPRO) 300 MG tablet; Take 1 tablet (300 mg) by mouth daily  - Basic metabolic panel; Future    Cough  Has noted some intermittent dry cough.  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  I sent some information about a trial of antihistamines versus antacids.  He does have a history of reflux as well.  Doubt it is related to the Avapro.    Screening cholesterol level    - Lipid panel reflex to direct LDL Non-fasting; Future    Colon cancer screening    - Fecal colorectal cancer screen FIT; Future         See Patient Instructions    Return in about 6 months (around 1/1/2023) for BP recheck, In Office or Video.    Frances Upton MD  [facility]   Carlos A is a 56 year old, presenting for the following health issues:  No chief complaint on file.      HPI     Video visit patient in follow-up of hypertension.  Also notes a dry hacking cough at times.    Review of Systems   Constitutional, HEENT, cardiovascular, pulmonary, gi and gu systems are negative, except as otherwise noted.      Objective           Vitals:  No vitals were obtained today due to virtual visit.    Physical Exam   GENERAL: Healthy, alert and no distress  EYES: Eyes grossly normal to inspection.  No discharge or erythema, or obvious scleral/conjunctival abnormalities.  RESP: No audible wheeze, cough, or visible cyanosis.  No visible retractions or increased work of breathing.    SKIN: Visible skin clear. No significant rash, abnormal pigmentation or lesions.  NEURO: Cranial nerves  grossly intact.  Mentation and speech appropriate for age.  PSYCH: Mentation appears normal, affect normal/bright, judgement and insight intact, normal speech and appearance well-groomed.    Past labs reviewed with the patient.             Video-Visit Details    Video Start Time: 1111    Type of service:  Video Visit    Video End Time:1125    Originating Location (pt. Location): Home    Distant Location (provider location):  [facility]     Platform used for Video Visit: Care Thread    .  .. Answers for HPI/ROS submitted by the patient on 6/28/2022  Do you check your blood pressure regularly outside of the clinic?: No  Are your blood pressures ever more than 140 on the top number (systolic) OR more than 90 on the bottom number (diastolic)? (For example, greater than 140/90): Yes  Are you following a low salt diet?: Yes

## 2022-09-26 ASSESSMENT — ENCOUNTER SYMPTOMS
WEAKNESS: 0
HEADACHES: 0
FEVER: 0
PARESTHESIAS: 0
CHILLS: 0
NAUSEA: 0
FREQUENCY: 0
SORE THROAT: 0
HEMATOCHEZIA: 0
DYSURIA: 0
ARTHRALGIAS: 1
MYALGIAS: 1
NERVOUS/ANXIOUS: 0
DIZZINESS: 0
PALPITATIONS: 0
JOINT SWELLING: 0
EYE PAIN: 0
DIARRHEA: 0
COUGH: 1
HEARTBURN: 1
SHORTNESS OF BREATH: 0
ABDOMINAL PAIN: 0
HEMATURIA: 0
CONSTIPATION: 0

## 2022-09-30 ENCOUNTER — OFFICE VISIT (OUTPATIENT)
Dept: FAMILY MEDICINE | Facility: CLINIC | Age: 57
End: 2022-09-30
Payer: COMMERCIAL

## 2022-09-30 VITALS
RESPIRATION RATE: 18 BRPM | BODY MASS INDEX: 33.5 KG/M2 | OXYGEN SATURATION: 99 % | DIASTOLIC BLOOD PRESSURE: 96 MMHG | HEIGHT: 70 IN | SYSTOLIC BLOOD PRESSURE: 140 MMHG | WEIGHT: 234 LBS | HEART RATE: 81 BPM | TEMPERATURE: 97.5 F

## 2022-09-30 DIAGNOSIS — Z12.5 PROSTATE CANCER SCREENING: ICD-10-CM

## 2022-09-30 DIAGNOSIS — E78.5 HYPERLIPIDEMIA LDL GOAL <100: ICD-10-CM

## 2022-09-30 DIAGNOSIS — R07.89 CHEST WALL PAIN: ICD-10-CM

## 2022-09-30 DIAGNOSIS — Z13.220 SCREENING CHOLESTEROL LEVEL: ICD-10-CM

## 2022-09-30 DIAGNOSIS — R60.0 PERIPHERAL EDEMA: ICD-10-CM

## 2022-09-30 DIAGNOSIS — I10 HYPERTENSION GOAL BP (BLOOD PRESSURE) < 140/90: ICD-10-CM

## 2022-09-30 DIAGNOSIS — Z23 HIGH PRIORITY FOR 2019-NCOV VACCINE: ICD-10-CM

## 2022-09-30 DIAGNOSIS — Z12.11 COLON CANCER SCREENING: ICD-10-CM

## 2022-09-30 DIAGNOSIS — Z00.00 ROUTINE GENERAL MEDICAL EXAMINATION AT A HEALTH CARE FACILITY: Primary | ICD-10-CM

## 2022-09-30 PROBLEM — R03.0 ELEVATED BLOOD-PRESSURE READING WITHOUT DIAGNOSIS OF HYPERTENSION: Status: RESOLVED | Noted: 2017-12-25 | Resolved: 2022-09-30

## 2022-09-30 PROCEDURE — G0103 PSA SCREENING: HCPCS | Performed by: FAMILY MEDICINE

## 2022-09-30 PROCEDURE — 80048 BASIC METABOLIC PNL TOTAL CA: CPT | Performed by: FAMILY MEDICINE

## 2022-09-30 PROCEDURE — 36415 COLL VENOUS BLD VENIPUNCTURE: CPT | Performed by: FAMILY MEDICINE

## 2022-09-30 PROCEDURE — 80061 LIPID PANEL: CPT | Performed by: FAMILY MEDICINE

## 2022-09-30 PROCEDURE — 99396 PREV VISIT EST AGE 40-64: CPT | Performed by: FAMILY MEDICINE

## 2022-09-30 RX ORDER — LOSARTAN POTASSIUM AND HYDROCHLOROTHIAZIDE 25; 100 MG/1; MG/1
1 TABLET ORAL DAILY
Qty: 90 TABLET | Refills: 1 | Status: SHIPPED | OUTPATIENT
Start: 2022-09-30 | End: 2022-12-02

## 2022-09-30 RX ORDER — CETIRIZINE HYDROCHLORIDE 10 MG/1
10 TABLET ORAL DAILY
COMMUNITY

## 2022-09-30 ASSESSMENT — ENCOUNTER SYMPTOMS
JOINT SWELLING: 0
FREQUENCY: 0
NERVOUS/ANXIOUS: 0
DIZZINESS: 0
CHILLS: 0
CONSTIPATION: 0
PALPITATIONS: 0
COUGH: 1
SORE THROAT: 0
ABDOMINAL PAIN: 0
ARTHRALGIAS: 1
SHORTNESS OF BREATH: 0
PARESTHESIAS: 0
HEADACHES: 0
WEAKNESS: 0
FEVER: 0
HEMATURIA: 0
EYE PAIN: 0
MYALGIAS: 1
HEARTBURN: 1
DIARRHEA: 0
NAUSEA: 0
HEMATOCHEZIA: 0
DYSURIA: 0

## 2022-09-30 ASSESSMENT — PAIN SCALES - GENERAL: PAINLEVEL: SEVERE PAIN (6)

## 2022-09-30 NOTE — PROGRESS NOTES
SUBJECTIVE:   CC: Carlos A is an 57 year old who presents for preventative health visit.       Patient has been advised of split billing requirements and indicates understanding: Yes  Healthy Habits:     Getting at least 3 servings of Calcium per day:  NO    Bi-annual eye exam:  Yes    Dental care twice a year:  Yes    Sleep apnea or symptoms of sleep apnea:  None    Diet:  Low salt    Frequency of exercise:  None    Taking medications regularly:  Yes    Medication side effects:  Other    PHQ-2 Total Score: 0    Additional concerns today:  Yes    Ability to successfully perform activities of daily living: Yes, no assistance needed  Home safety:  none identified   Hearing impairment: Yes mild      Pt has noticed mostly on L calf, standing causes bulging and veins to show more. Would like to see what could be causing.   Pt has also been having abdominal/side pain from coughing for a few years. (pt has been taking allergy pills to help with coughing per provider)    Today's PHQ-2 Score:   PHQ-2 ( 1999 Pfizer) 9/26/2022   Q1: Little interest or pleasure in doing things 0   Q2: Feeling down, depressed or hopeless 0   PHQ-2 Score 0   PHQ-2 Total Score (12-17 Years)- Positive if 3 or more points; Administer PHQ-A if positive -   Q1: Little interest or pleasure in doing things Not at all   Q2: Feeling down, depressed or hopeless Not at all   PHQ-2 Score 0       Abuse: Current or Past(Physical, Sexual or Emotional)- No  Do you feel safe in your environment? Yes    Have you ever done Advance Care Planning? (For example, a Health Directive, POLST, or a discussion with a medical provider or your loved ones about your wishes): No, advance care planning information given to patient to review.  Patient plans to discuss their wishes with loved ones or provider.      Social History     Tobacco Use     Smoking status: Never Smoker     Smokeless tobacco: Never Used   Substance Use Topics     Alcohol use: Yes     Comment: occassional - 1  to 2 times a year     If you drink alcohol do you typically have >3 drinks per day or >7 drinks per week? No    Alcohol Use 9/26/2022   Prescreen: >3 drinks/day or >7 drinks/week? Not Applicable   Prescreen: >3 drinks/day or >7 drinks/week? -   No flowsheet data found.    Last PSA:   PSA   Date Value Ref Range Status   11/05/2019 0.85 0 - 4 ug/L Final     Comment:     Assay Method:  Chemiluminescence using Siemens Vista analyzer       Reviewed orders with patient. Reviewed health maintenance and updated orders accordingly - Yes  Lab work is in process  Labs reviewed in EPIC  BP Readings from Last 3 Encounters:   09/30/22 (!) 140/96   01/17/22 (!) 146/99   01/12/22 (!) 145/97    Wt Readings from Last 3 Encounters:   09/30/22 106.1 kg (234 lb)   01/17/22 103.9 kg (229 lb 1.6 oz)   01/12/22 106.1 kg (233 lb 12.8 oz)                    Reviewed and updated as needed this visit by clinical staff   Tobacco  Allergies  Meds  Problems  Med Hx  Surg Hx  Fam Hx  Soc   Hx          Reviewed and updated as needed this visit by Provider   Tobacco  Allergies  Meds  Problems  Med Hx  Surg Hx  Fam Hx           Here today for routine annual visit and follow-up on hypertension.  Has been having significant coughing for the past couple of years.  We changed from lisinopril to irbesartan a number of years ago.  Did not seem to change that much.  But he coughed hard enough that he was found to have some fractured ribs on the right side in January.  Still sore in that area and sore across his abdomen.    Review of Systems   Constitutional: Negative for chills and fever.   HENT: Positive for congestion. Negative for ear pain, hearing loss and sore throat.    Eyes: Negative for pain and visual disturbance.   Respiratory: Positive for cough. Negative for shortness of breath.    Cardiovascular: Negative for chest pain, palpitations and peripheral edema.   Gastrointestinal: Positive for heartburn. Negative for abdominal pain,  "constipation, diarrhea, hematochezia and nausea.   Genitourinary: Positive for impotence. Negative for dysuria, frequency, genital sores, hematuria, penile discharge and urgency.   Musculoskeletal: Positive for arthralgias and myalgias. Negative for joint swelling.   Skin: Negative for rash.   Neurological: Negative for dizziness, weakness, headaches and paresthesias.   Psychiatric/Behavioral: Negative for mood changes. The patient is not nervous/anxious.          OBJECTIVE:   BP (!) 140/96   Pulse 81   Temp 97.5  F (36.4  C) (Tympanic)   Resp 18   Ht 1.778 m (5' 10\")   Wt 106.1 kg (234 lb)   SpO2 99%   BMI 33.58 kg/m      Physical Exam  GENERAL: healthy, alert and no distress  EYES: Eyes grossly normal to inspection, PERRL and conjunctivae and sclerae normal  HENT: ear canals and TM's normal, nose and mouth without ulcers or lesions  NECK: no adenopathy, no asymmetry, masses, or scars and thyroid normal to palpation  RESP: lungs clear to auscultation - no rales, rhonchi or wheezes  CV: regular rates and rhythm, normal S1 S2, no S3 or S4, no murmur, click or rub and peripheral edema bilaterally with mild varicosities  ABDOMEN: soft, nontender, no hepatosplenomegaly, no masses and bowel sounds normal  MS: no gross musculoskeletal defects noted, no edema  SKIN: no suspicious lesions or rashes  NEURO: Normal strength and tone, mentation intact and speech normal  PSYCH: mentation appears normal, affect normal/bright    Diagnostic Test Results:  Labs reviewed in Epic    ASSESSMENT/PLAN:   (Z00.00) Routine general medical examination at a health care facility  (primary encounter diagnosis)  Comment: Discussed routine health maintenance.  Allergic to flu shot but would like to get COVID booster.  Plan:     (I10) Hypertension goal BP (blood pressure) < 140/90  Comment: Blood pressure not fully controlled and it may be he is getting some cough and/or peripheral edema related to this.  Difficult to say but it is the " "perfect time to change to Hyzaar to add a touch of diuretic.  Plan: losartan-hydrochlorothiazide (HYZAAR) 100-25 MG        tablet, Basic metabolic panel            (E78.5) Hyperlipidemia LDL goal <100  Comment: Recheck  Plan:     (R07.89) Chest wall pain  Comment: Not sure how much is related to the coughing that stopping coughing could help.  I also gave him some information on a trial to do at home such as a couple of weeks of reflux treatment versus antihistamines and see if that helps.  Plan:     (R60.9) Peripheral edema  Comment: Hoping the addition of the diuretic will help  Plan:     (Z13.220) Screening cholesterol level  Comment:   Plan: Lipid panel reflex to direct LDL Non-fasting            (Z12.5) Prostate cancer screening  Comment:   Plan: Prostate Specific Antigen Screen            (Z12.11) Colon cancer screening  Comment:   Plan: Fecal colorectal cancer screen FIT            (Z23) High priority for 2019-nCoV vaccine  Comment:   Plan: COVID-19,PF,PFIZER BOOSTER BIVALENT 12+Yrs              Patient has been advised of split billing requirements and indicates understanding: Yes    COUNSELING:   Reviewed preventive health counseling, as reflected in patient instructions       Regular exercise       Healthy diet/nutrition       Vision screening       Colorectal cancer screening       Prostate cancer screening    Estimated body mass index is 33.58 kg/m  as calculated from the following:    Height as of this encounter: 1.778 m (5' 10\").    Weight as of this encounter: 106.1 kg (234 lb).     Weight management plan: Discussed healthy diet and exercise guidelines    He reports that he has never smoked. He has never used smokeless tobacco.      Counseling Resources:  ATP IV Guidelines  Pooled Cohorts Equation Calculator  FRAX Risk Assessment  ICSI Preventive Guidelines  Dietary Guidelines for Americans, 2010  USDA's MyPlate  ASA Prophylaxis  Lung CA Screening    Frances Upton MD  [facility] " [place]

## 2022-09-30 NOTE — PATIENT INSTRUCTIONS
Plan:    1) we are changing from irbesartan to Hyzaar.  This is a combination medicine that has 1 medicine that is similar to the irbesartan plus a diuretic.  The diuretic should help lower the blood pressure and also get rid of some of the swelling in the legs and varicose veins.  I also think changing may get rid of that cough.   -Keep an eye on blood pressure and contact me in a few weeks with how things are going    2) for the chest wall I can only imagine that the fractures are healed, but it may still be sore especially with ongoing coughing.  So if we can stop the cough that will help.  You can also try some topical Voltaren gel (sold over-the-counter and there is a generic for this) which has an absorbable anti-inflammatory like Advil.  You can rub it directly onto that area a few times a day and it should help.      Chronic cough can relate to either drainage from above (allergies, post-nasal drip) or acid reflux from below.  Try either:    - claritin or zyrtec (generic OK)   Or  - prilosec, zantac, or pepcid nightly    2 week trial - if no change, try the other.   Preventive Health Recommendations  Male Ages 50 - 64    Yearly exam:             See your health care provider every year in order to  o   Review health changes.   o   Discuss preventive care.    o   Review your medicines if your doctor has prescribed any.     Have a cholesterol test every 5 years, or more frequently if you are at risk for high cholesterol/heart disease.     Have a diabetes test (fasting glucose) every three years. If you are at risk for diabetes, you should have this test more often.     Have a colonoscopy at age 50, or have a yearly FIT test (stool test). These exams will check for colon cancer.      Talk with your health care provider about whether or not a prostate cancer screening test (PSA) is right for you.    You should be tested each year for STDs (sexually transmitted diseases), if you re at risk.     Shots: Get a flu  shot each year. Get a tetanus shot every 10 years.     Nutrition:    Eat at least 5 servings of fruits and vegetables daily.     Eat whole-grain bread, whole-wheat pasta and brown rice instead of white grains and rice.     Get adequate Calcium and Vitamin D.     Lifestyle    Exercise for at least 150 minutes a week (30 minutes a day, 5 days a week). This will help you control your weight and prevent disease.     Limit alcohol to one drink per day.     No smoking.     Wear sunscreen to prevent skin cancer.     See your dentist every six months for an exam and cleaning.     See your eye doctor every 1 to 2 years.

## 2022-10-03 LAB
ANION GAP SERPL CALCULATED.3IONS-SCNC: 5 MMOL/L (ref 3–14)
BUN SERPL-MCNC: 12 MG/DL (ref 7–30)
CALCIUM SERPL-MCNC: 9 MG/DL (ref 8.5–10.1)
CHLORIDE BLD-SCNC: 105 MMOL/L (ref 94–109)
CHOLEST SERPL-MCNC: 221 MG/DL
CO2 SERPL-SCNC: 27 MMOL/L (ref 20–32)
CREAT SERPL-MCNC: 0.89 MG/DL (ref 0.66–1.25)
FASTING STATUS PATIENT QL REPORTED: NO
GFR SERPL CREATININE-BSD FRML MDRD: >90 ML/MIN/1.73M2
GLUCOSE BLD-MCNC: 98 MG/DL (ref 70–99)
HDLC SERPL-MCNC: 58 MG/DL
LDLC SERPL CALC-MCNC: 154 MG/DL
NONHDLC SERPL-MCNC: 163 MG/DL
POTASSIUM BLD-SCNC: 3.9 MMOL/L (ref 3.4–5.3)
PSA SERPL-MCNC: 0.68 UG/L (ref 0–4)
SODIUM SERPL-SCNC: 137 MMOL/L (ref 133–144)
TRIGL SERPL-MCNC: 45 MG/DL

## 2022-10-03 NOTE — RESULT ENCOUNTER NOTE
"Carlos A,  Lab work looks good.  PSA test looks fantastic.  Kidney function, glucose, electrolytes all perfectly normal.  Cholesterol looks about the same as previous.  Even though there is a mild elevation of the LDL (\"bad\" cholesterol) I think your high HDL (\"good\" cholesterol) offsets this so I think things are okay for now.  Keep up the good work and great to see you the other day.  AXEL Upton M.D. "

## 2022-10-10 ENCOUNTER — HEALTH MAINTENANCE LETTER (OUTPATIENT)
Age: 57
End: 2022-10-10

## 2022-11-07 ENCOUNTER — OFFICE VISIT (OUTPATIENT)
Dept: URGENT CARE | Facility: URGENT CARE | Age: 57
End: 2022-11-07
Payer: COMMERCIAL

## 2022-11-07 ENCOUNTER — TELEPHONE (OUTPATIENT)
Dept: FAMILY MEDICINE | Facility: CLINIC | Age: 57
End: 2022-11-07

## 2022-11-07 ENCOUNTER — TELEPHONE (OUTPATIENT)
Dept: UROLOGY | Facility: CLINIC | Age: 57
End: 2022-11-07

## 2022-11-07 VITALS
RESPIRATION RATE: 20 BRPM | DIASTOLIC BLOOD PRESSURE: 88 MMHG | WEIGHT: 234.6 LBS | TEMPERATURE: 97.8 F | BODY MASS INDEX: 33.66 KG/M2 | OXYGEN SATURATION: 97 % | SYSTOLIC BLOOD PRESSURE: 144 MMHG | HEART RATE: 72 BPM

## 2022-11-07 DIAGNOSIS — R31.9 PAINLESS HEMATURIA: Primary | ICD-10-CM

## 2022-11-07 DIAGNOSIS — M54.50 CHRONIC BILATERAL LOW BACK PAIN WITHOUT SCIATICA: ICD-10-CM

## 2022-11-07 DIAGNOSIS — G89.29 CHRONIC BILATERAL LOW BACK PAIN WITHOUT SCIATICA: ICD-10-CM

## 2022-11-07 LAB
ALBUMIN UR-MCNC: NEGATIVE MG/DL
APPEARANCE UR: CLEAR
BACTERIA #/AREA URNS HPF: ABNORMAL /HPF
BILIRUB UR QL STRIP: NEGATIVE
COLOR UR AUTO: YELLOW
GLUCOSE UR STRIP-MCNC: NEGATIVE MG/DL
HGB UR QL STRIP: ABNORMAL
KETONES UR STRIP-MCNC: NEGATIVE MG/DL
LEUKOCYTE ESTERASE UR QL STRIP: NEGATIVE
NITRATE UR QL: NEGATIVE
PH UR STRIP: 6 [PH] (ref 5–7)
RBC #/AREA URNS AUTO: ABNORMAL /HPF
SP GR UR STRIP: 1.02 (ref 1–1.03)
SQUAMOUS #/AREA URNS AUTO: ABNORMAL /LPF
UROBILINOGEN UR STRIP-ACNC: 0.2 E.U./DL
WBC #/AREA URNS AUTO: ABNORMAL /HPF

## 2022-11-07 PROCEDURE — 87086 URINE CULTURE/COLONY COUNT: CPT | Performed by: EMERGENCY MEDICINE

## 2022-11-07 PROCEDURE — 99214 OFFICE O/P EST MOD 30 MIN: CPT | Performed by: EMERGENCY MEDICINE

## 2022-11-07 PROCEDURE — 81001 URINALYSIS AUTO W/SCOPE: CPT | Performed by: EMERGENCY MEDICINE

## 2022-11-07 NOTE — TELEPHONE ENCOUNTER
[facility] Call Center    Phone Message    May a detailed message be left on voicemail: yes     Reason for Call: Appointment Intake    Referring Provider Name: Matt Hays PA-C  Diagnosis and/or Symptoms: Gross Hematuria    Patient being referred for Urgent Appointment (1-2 weeks) by referring provider. Sending encounter message for clinic review and follow-up with patient for scheduling.     Action Taken: Message routed to:  [facility] ([facility] Urology    Travel Screening: Not Applicable

## 2022-11-07 NOTE — PROGRESS NOTES
Assessment & Plan     Diagnosis:    (R31.9) Painless hematuria  (primary encounter diagnosis)  Plan: Urine Culture, Adult Urology          Referral    (M54.50,  G89.29) Chronic bilateral low back pain without sciatica      Medical Decision Making:  Carlos A Crespo is a 57 year old male who presents for evaluation of painless hematuria this morning; has cleared up slightly per patient. UA shows 25-50 RBCs under microscopy; no signs of infection. Regardless, urine culture was added and is pending.  Patient has chronic back pain and no CVA tenderness here, a completely benign abdominal exam, no fevers, urgency, frequency or retention concerns.    A broad differential was considered including UT, pyelonephritis, ureterolithiasis, bladder mass, prostatitis, among others. Discussed going to the [facility] for further evaluation with CT imaging or abdominal x-ray here and patient refuses; says that he does not want any more labs or imaging done due to cost, would like to follow-up with Urology.  Discussed importance of close follow-up with PCP and Urology, that this could represent a kidney stone, urinary tract or kidney infection, or even a bladder mass/tumor and he voices understanding.      Patient verbalizes understanding and agreement with the plan including reasons to go to the ER. All questions answered.       Matt Hays PA-C  [facility]    Subjective     Carlos A Crespo is a 57 year old male who presents to clinic today for the following health issues:  Chief Complaint   Patient presents with     UTI         HPI  Patient states that he has been experiencing bright red blood in his urine this morning when he first got up to pee, notes that the next time it looked a little more clear.  He denies any pain urinating, retention, incontinence, frequency or urgency of urination.  He notes a history of chronic low back pain, states that this is unchanged at this time and is in the  low back on both sides, does not radiate down the legs.    Patient denies any abdominal pain, fever, nausea, vomiting, diarrhea, inability to urinate, penile discharge or concerns for STDS.  He has had no pain with defecation, no history of kidney stones, BPH or bladder tumor.    Review of Systems    See HPI    Objective      Vitals:    Patient Vitals for the past 24 hrs:   BP Temp Temp src Pulse Resp SpO2 Weight   11/07/22 1021 (!) 144/88 97.8  F (36.6  C) Tympanic 72 20 97 % 106.4 kg (234 lb 9.6 oz)         Vital signs reviewed by: Matt Hays PA-C    Physical Exam   Constitutional: The patient is oriented to person, place, and time. Alert and cooperative. No acute distress  Mouth: Mucous membranes are moist.  Cardiovascular: Regular rate and rhythm.  Pulmonary/Chest: Effort normal. No respiratory distress.   GI: Abdomen is soft, non-tender and non-distended throughout. Normoactive bowel sounds. No rebound or guarding. No McBurney point tenderness.   : Deferred   MSK: No CVA tenderness bilaterally.  Neurological: Alert and oriented x3.     Labs/Imaging:    Results for orders placed or performed in visit on 11/07/22   UA Macro with Reflex to Micro and Culture - lab collect     Status: Abnormal    Specimen: Urine, Midstream   Result Value Ref Range    Color Urine Yellow Colorless, Straw, Light Yellow, Yellow    Appearance Urine Clear Clear    Glucose Urine Negative Negative, 1000 , >=2000 mg/dL    Bilirubin Urine Negative Negative    Ketones Urine Negative Negative, 160  mg/dL    Specific Gravity Urine 1.020 1.003 - 1.035    Blood Urine Large (A) Negative    pH Urine 6.0 5.0 - 7.0    Protein Albumin Urine Negative Negative, 300 , >=2000 mg/dL    Urobilinogen Urine 0.2 0.2, 1.0 E.U./dL    Nitrite Urine Negative Negative    Leukocyte Esterase Urine Negative Negative   Urine Microscopic     Status: Abnormal   Result Value Ref Range    Bacteria Urine Few (A) None Seen /HPF    RBC Urine 25-50 (A) 0-2 /HPF /HPF     WBC Urine None Seen 0-5 /HPF /HPF    Squamous Epithelials Urine Few (A) None Seen /LPF    Narrative    Urine Culture Pending         Matt Hays PA-C, November 7, 2022

## 2022-11-07 NOTE — TELEPHONE ENCOUNTER
Patient called c/o blood in urine this morning, also c/o painful urination  Did have low back pain last Thursday/friday   Feels cold, no fever  Instructed patient needs to be seen.   Patient instructed to go to urgent care today      Katt LEWIS, RN

## 2022-11-09 LAB — BACTERIA UR CULT: NO GROWTH

## 2023-01-20 ENCOUNTER — OFFICE VISIT (OUTPATIENT)
Dept: FAMILY MEDICINE | Facility: CLINIC | Age: 58
End: 2023-01-20
Payer: COMMERCIAL

## 2023-01-20 ENCOUNTER — ANCILLARY PROCEDURE (OUTPATIENT)
Dept: GENERAL RADIOLOGY | Facility: CLINIC | Age: 58
End: 2023-01-20
Attending: FAMILY MEDICINE
Payer: COMMERCIAL

## 2023-01-20 ENCOUNTER — LAB (OUTPATIENT)
Dept: FAMILY MEDICINE | Facility: CLINIC | Age: 58
End: 2023-01-20

## 2023-01-20 VITALS
HEART RATE: 86 BPM | OXYGEN SATURATION: 98 % | SYSTOLIC BLOOD PRESSURE: 156 MMHG | BODY MASS INDEX: 33.58 KG/M2 | TEMPERATURE: 98.2 F | RESPIRATION RATE: 15 BRPM | DIASTOLIC BLOOD PRESSURE: 101 MMHG | WEIGHT: 234 LBS

## 2023-01-20 DIAGNOSIS — Z23 NEED FOR VACCINATION: ICD-10-CM

## 2023-01-20 DIAGNOSIS — L60.3 NAIL DYSTROPHY: ICD-10-CM

## 2023-01-20 DIAGNOSIS — I10 HYPERTENSION GOAL BP (BLOOD PRESSURE) < 140/90: Primary | ICD-10-CM

## 2023-01-20 DIAGNOSIS — R05.3 CHRONIC COUGH: ICD-10-CM

## 2023-01-20 DIAGNOSIS — Z12.11 COLON CANCER SCREENING: ICD-10-CM

## 2023-01-20 DIAGNOSIS — B35.1 ONYCHOMYCOSIS: ICD-10-CM

## 2023-01-20 PROCEDURE — 99214 OFFICE O/P EST MOD 30 MIN: CPT | Mod: 25 | Performed by: FAMILY MEDICINE

## 2023-01-20 PROCEDURE — 71046 X-RAY EXAM CHEST 2 VIEWS: CPT | Mod: TC | Performed by: RADIOLOGY

## 2023-01-20 PROCEDURE — 90750 HZV VACC RECOMBINANT IM: CPT | Performed by: FAMILY MEDICINE

## 2023-01-20 PROCEDURE — 90471 IMMUNIZATION ADMIN: CPT | Performed by: FAMILY MEDICINE

## 2023-01-20 RX ORDER — AMLODIPINE BESYLATE 10 MG/1
10 TABLET ORAL DAILY
Qty: 90 TABLET | Refills: 0 | Status: SHIPPED | OUTPATIENT
Start: 2023-01-20 | End: 2023-04-03

## 2023-01-20 ASSESSMENT — PAIN SCALES - GENERAL: PAINLEVEL: NO PAIN (0)

## 2023-01-20 NOTE — PATIENT INSTRUCTIONS
1) increase the amlodipine to 10 mg daily.  I will send in a new prescription but you can use 2 tablets of what you have.  Does not really matter if you take it morning or night.  Follow blood pressure and let me know in a few weeks how it is running.    2) Chronic cough can relate to either drainage from above (allergies, post-nasal drip) or acid reflux from below.  Try either:    - claritin or zyrtec (generic OK)   (Take at night, or try changing to Claritin or Allegra)    Or    - prilosec, zantac, or pepcid nightly (every night)     Two week trial - if no change, try the other.  And let me know how it is going.    3) the neck step would be to get you in with a specialist like an ENT or lung specialist ask about the cough.  And we can set that up whenever -just send me a MyChart message    4) for the toenails I am going to get you in with one of our foot specialists to do something about that toenail and also deal with the nail fungus.

## 2023-01-20 NOTE — PROGRESS NOTES
cxr  Answers for HPI/ROS submitted by the patient on 1/20/2023  Do you check your blood pressure regularly outside of the clinic?: No  Are your blood pressures ever more than 140 on the top number (systolic) OR more than 90 on the bottom number (diastolic)? (For example, greater than 140/90): Yes  Are you following a low salt diet?: Yes      Assessment & Plan     Hypertension goal BP (blood pressure) < 140/90  We had changed from lisinopril to irbesartan and then eventually to amlodipine partially for better control and partially for an ongoing cough.  Home blood pressure readings typically in the 140s systolic.  Cough is still present so I do not think this is related to that.  We will increase amlodipine to 10 mg daily and have him contact me in a few weeks.  Might need to add a water pill for better control and/or if swelling develops.  - amLODIPine (NORVASC) 10 MG tablet; Take 1 tablet (10 mg) by mouth daily    Cough  Chest x-ray unremarkable.  Do not think this is related to blood pressure medication.  Discussed treating potential allergies and irritation and/or reflux.  So schedule his antihistamines and acid blockers.  Neck step would be referral to ENT or pulmonary    Nail dystrophy  Severely dystrophic left great toenail where it is curled and moving around the toe.  Ever since a trauma in the past.  I do not know that anything can be done on a permanent basis but a least temporarily it might need a little bit of care which is above what we can provide in the clinic.  Also has some ongoing onychomycosis so I think at least a one-time visit with podiatry would be great.  - Orthopedic  Referral; Future    Onychomycosis  As above  - Orthopedic  Referral; Future    Colon cancer screening    - COLOGROSAURA(EXACT SCIENCES); Future         See Patient Instructions    Return in about 3 weeks (around 2/10/2023) for Contact me with Adis louis message.    Frances Upton MD   HEALTH  [facility] ROBSON Strauss is a 57 year old, presenting for the following health issues:  Hypertension      History of Present Illness       Hypertension: He presents for follow up of hypertension.  He does not check blood pressure  regularly outside of the clinic. Outside blood pressures have been over 140/90. He follows a low salt diet.           Here today primarily to follow-up on hypertension 1 to talk but some other issues as above.    Review of Systems   Constitutional, HEENT, cardiovascular, pulmonary, gi and gu systems are negative, except as otherwise noted.      Objective    BP (!) 156/101 (BP Location: Left arm, Patient Position: Sitting, Cuff Size: Adult Large)   Pulse 86   Temp 98.2  F (36.8  C) (Oral)   Resp 15   Wt 106.1 kg (234 lb)   SpO2 98%   BMI 33.58 kg/m    Body mass index is 33.58 kg/m .  Physical Exam   Alert, pleasant, upbeat, and in no apparent discomfort.  S1 and S2 normal, no murmurs, clicks, gallops or rubs. Regular rate and rhythm. Chest is clear; no wheezes or rales. No edema or JVD.  Severely dystrophic and controlled left great toenail  Past labs reviewed with the patient.     Chest x-ray reviewed and read by me is unremarkable

## 2023-01-20 NOTE — NURSING NOTE
Prior to immunization administration, verified patients identity using patient s name and date of birth. Please see Immunization Activity for additional information.     Screening Questionnaire for Adult Immunization    Are you sick today?   No   Do you have allergies to medications, food, a vaccine component or latex?   No   Have you ever had a serious reaction after receiving a vaccination?   No   Do you have a long-term health problem with heart, lung, kidney, or metabolic disease (e.g., diabetes), asthma, a blood disorder, no spleen, complement component deficiency, a cochlear implant, or a spinal fluid leak?  Are you on long-term aspirin therapy?   No   Do you have cancer, leukemia, HIV/AIDS, or any other immune system problem?   No   Do you have a parent, brother, or sister with an immune system problem?   No   In the past 3 months, have you taken medications that affect  your immune system, such as prednisone, other steroids, or anticancer drugs; drugs for the treatment of rheumatoid arthritis, Crohn s disease, or psoriasis; or have you had radiation treatments?   No   Have you had a seizure, or a brain or other nervous system problem?   No   During the past year, have you received a transfusion of blood or blood    products, or been given immune (gamma) globulin or antiviral drug?   No   For women: Are you pregnant or is there a chance you could become       pregnant during the next month?   No   Have you received any vaccinations in the past 4 weeks?   No     Immunization questionnaire answers were all negative.        Per orders of Dr. PERKINS, injection of SHINGRIX given by Shara Sierra. Patient instructed to remain in clinic for 15 minutes afterwards, and to report any adverse reaction to me immediately.       Screening performed by Shara Sierra on 1/20/2023 at 2:06 PM.

## 2023-01-25 ENCOUNTER — TELEPHONE (OUTPATIENT)
Dept: OTHER | Age: 58
End: 2023-01-25

## 2023-01-25 NOTE — TELEPHONE ENCOUNTER
Hello,  I'm with ortho con.  Who would pt see for Severely dystrophic curled left great toenail and unrelated onychomycosis.  Thank you.

## 2023-01-30 NOTE — TELEPHONE ENCOUNTER
"Returned call to pt who would not like to proceed at this time d/t other medical bills. Advised he can look up on the Internet a \"medical pedicure\" this may be much more affordable for him. He will consider this or if he changes his mind call back.    Mary AMBROCIO RN Specialty Triage 1/30/2023 1:09 PM    "

## 2023-02-13 ENCOUNTER — MYC MEDICAL ADVICE (OUTPATIENT)
Dept: FAMILY MEDICINE | Facility: CLINIC | Age: 58
End: 2023-02-13
Payer: COMMERCIAL

## 2023-02-14 LAB — NONINV COLON CA DNA+OCC BLD SCRN STL QL: NEGATIVE

## 2023-02-14 NOTE — RESULT ENCOUNTER NOTE
Carlos A,  Your Cologuard test was negative.   We will plan to repeat this screening test every three years.  AXEL Upton M.D.

## 2023-04-03 DIAGNOSIS — I10 HYPERTENSION GOAL BP (BLOOD PRESSURE) < 140/90: ICD-10-CM

## 2023-04-03 RX ORDER — AMLODIPINE BESYLATE 10 MG/1
10 TABLET ORAL DAILY
Qty: 90 TABLET | Refills: 0 | Status: SHIPPED | OUTPATIENT
Start: 2023-04-03 | End: 2023-06-30

## 2023-04-03 NOTE — TELEPHONE ENCOUNTER
Pt calling regarding refill request. Pt states he needs this filled tonight. He has no medication left. Insurance changed and needs filled at [facility] for 90 day supply.     Informed pt refills can take 48-72 hours to process, advise requesting fill sooner next time.   Pt verbalized understanding. Will route as high priority.   Cindy Raphael RN    [facility]- Primary Care

## 2023-04-03 NOTE — TELEPHONE ENCOUNTER
Refill sent.  No problem on that.  But just wanted to know how his blood pressure is running at home since we made the increase.  He can feel free to send me a message anytime.

## 2023-04-07 NOTE — TELEPHONE ENCOUNTER
[employer] message sent to the pt with this request.  Cindy Raphael RN    [facility]- Primary Care

## 2023-04-10 ENCOUNTER — OFFICE VISIT (OUTPATIENT)
Dept: URGENT CARE | Facility: URGENT CARE | Age: 58
End: 2023-04-10
Payer: COMMERCIAL

## 2023-04-10 VITALS
BODY MASS INDEX: 32.28 KG/M2 | WEIGHT: 225 LBS | TEMPERATURE: 100.2 F | DIASTOLIC BLOOD PRESSURE: 107 MMHG | HEART RATE: 97 BPM | OXYGEN SATURATION: 98 % | SYSTOLIC BLOOD PRESSURE: 151 MMHG

## 2023-04-10 DIAGNOSIS — J20.9 ACUTE BRONCHITIS, UNSPECIFIED ORGANISM: Primary | ICD-10-CM

## 2023-04-10 DIAGNOSIS — I10 HYPERTENSION GOAL BP (BLOOD PRESSURE) < 140/90: ICD-10-CM

## 2023-04-10 PROCEDURE — 99214 OFFICE O/P EST MOD 30 MIN: CPT | Performed by: PHYSICIAN ASSISTANT

## 2023-04-10 RX ORDER — BENZONATATE 100 MG/1
100 CAPSULE ORAL 3 TIMES DAILY PRN
Qty: 30 CAPSULE | Refills: 0 | Status: SHIPPED | OUTPATIENT
Start: 2023-04-10 | End: 2023-12-12

## 2023-04-10 RX ORDER — AZITHROMYCIN 250 MG/1
TABLET, FILM COATED ORAL
Qty: 6 TABLET | Refills: 0 | Status: SHIPPED | OUTPATIENT
Start: 2023-04-10 | End: 2023-04-15

## 2023-04-10 RX ORDER — CODEINE PHOSPHATE AND GUAIFENESIN 10; 100 MG/5ML; MG/5ML
1-2 SOLUTION ORAL EVERY 4 HOURS PRN
Qty: 118 ML | Refills: 0 | Status: SHIPPED | OUTPATIENT
Start: 2023-04-10 | End: 2023-12-12

## 2023-04-10 RX ORDER — ALBUTEROL SULFATE 90 UG/1
2 AEROSOL, METERED RESPIRATORY (INHALATION) EVERY 6 HOURS PRN
Qty: 18 G | Refills: 0 | Status: SHIPPED | OUTPATIENT
Start: 2023-04-10 | End: 2023-12-12

## 2023-04-10 ASSESSMENT — ENCOUNTER SYMPTOMS
CHEST TIGHTNESS: 0
COUGH: 1
FEVER: 1
GASTROINTESTINAL NEGATIVE: 1
WHEEZING: 0
STRIDOR: 0
RHINORRHEA: 1
CARDIOVASCULAR NEGATIVE: 1
APNEA: 0
SHORTNESS OF BREATH: 0
SORE THROAT: 0
CHOKING: 0

## 2023-04-10 NOTE — PROGRESS NOTES
"  Assessment & Plan     (J20.9) Acute bronchitis, unspecified organism  (primary encounter diagnosis)  Comment: Lungs clear to auscultation bilaterally, normal oxygen saturation.  No symptoms consistent with ACS.  Plan: albuterol (PROAIR HFA/PROVENTIL HFA/VENTOLIN         HFA) 108 (90 Base) MCG/ACT inhaler, benzonatate        (TESSALON) 100 MG capsule, azithromycin         (ZITHROMAX) 250 MG tablet            Pt having symptoms of acute bronchitis.  He declines testing for COVID-19 today.     Provided albuterol inhaler, benzonatate, and Robitussin-AC for symptomatic relief of chest tightness, wheezing and cough.  Prescribed a watch and wait prescription for azithromycin.  Recommended that he not start azithromycin get considering his symptoms are likely viral until at least 10 days from onset.  He is in no acute distress currently.    Symptomatic cares include: pushing fluids, rest, OTC cold medication such as Mucinex DM. For the sore throat patient can use salt water gargles, cough drops, chloraseptic spray/drops and tylenol/ibuprofen. Follow up with PCP if no improvement in 4 to 5 days.     Seek urgent medical evaluation if there are new or worsening symptoms such as fever of 102 degrees F or greater, chest tightness, wheezing, chest pain, shortness of breath, facial pressure, severe headaches, trouble breathing, trouble swallowing, severe or worsening nausea/vomiting, or severe abdominal pain.     Has elevated blood pressure today as well.  However no signs or symptoms consistent with ACS today.  Recommend continue to monitor and follow-up with primary care for further evaluation and management.    Pt/guardian verbalized understanding and agrees with the treatment plan.                   BMI:   Estimated body mass index is 32.28 kg/m  as calculated from the following:    Height as of 9/30/22: 1.778 m (5' 10\").    Weight as of this encounter: 102.1 kg (225 lb).           No follow-ups on file.    Aknit Dodson, " AGATA CASTRO [facility] ROBSONMADDIE Strauss is a 57 year old, presenting for the following health issues:  URI (Negative covid test, history of Bronchitis)         View : No data to display.              HPI     The patient is a 58 yo male with a past medical history of hypertension and scoliosis of thoracic spine who presents with complaints of cough which began on April 5, 2023.  Associated symptoms include wet-sounding but nonproductive cough, fever up to 100 degrees F, runny nose, and nasal congestion,  The patient has been taking OTC cough medication (Mucinex) with no relief of symptoms. No concerns for breathing or swallowing, chest pain, shortness of breath, abdominal pain, joint pain or rashes, headaches, acute vision changes, nausea or vomiting, constipation or diarrhea, or leg pain/swelling. Normal bowel and bladder function.  Tested negative for COVID-19 yesterday.              Review of Systems   Constitutional: Positive for fever.   HENT: Positive for congestion and rhinorrhea. Negative for sore throat.    Respiratory: Positive for cough. Negative for apnea, choking, chest tightness, shortness of breath, wheezing and stridor.    Cardiovascular: Negative.    Gastrointestinal: Negative.    Genitourinary: Negative.             Objective    BP (!) 151/107   Pulse 97   Temp 100.2  F (37.9  C) (Tympanic)   Wt 102.1 kg (225 lb)   SpO2 98%   BMI 32.28 kg/m    Body mass index is 32.28 kg/m .  Physical Exam  Vitals reviewed.   Constitutional:       General: He is not in acute distress.     Appearance: Normal appearance. He is not ill-appearing, toxic-appearing or diaphoretic.   HENT:      Head: Normocephalic and atraumatic.      Nose: Congestion and rhinorrhea present.      Mouth/Throat:      Mouth: Mucous membranes are moist.      Pharynx: Oropharynx is clear. No oropharyngeal exudate or posterior oropharyngeal erythema.   Cardiovascular:      Rate and Rhythm: Normal rate and  regular rhythm.      Pulses: Normal pulses.      Heart sounds: Normal heart sounds. No murmur heard.  Pulmonary:      Effort: Pulmonary effort is normal. No respiratory distress.      Breath sounds: Normal breath sounds. No stridor. No wheezing, rhonchi or rales.   Chest:      Chest wall: No tenderness.   Musculoskeletal:         General: Normal range of motion.      Cervical back: Normal range of motion and neck supple. No rigidity. No muscular tenderness.   Lymphadenopathy:      Cervical: No cervical adenopathy.   Skin:     General: Skin is warm and dry.   Neurological:      Mental Status: He is alert and oriented to person, place, and time.

## 2023-04-20 ENCOUNTER — OFFICE VISIT (OUTPATIENT)
Dept: URGENT CARE | Facility: URGENT CARE | Age: 58
End: 2023-04-20
Payer: COMMERCIAL

## 2023-04-20 ENCOUNTER — ANCILLARY PROCEDURE (OUTPATIENT)
Dept: GENERAL RADIOLOGY | Facility: CLINIC | Age: 58
End: 2023-04-20
Payer: COMMERCIAL

## 2023-04-20 VITALS
BODY MASS INDEX: 32.28 KG/M2 | DIASTOLIC BLOOD PRESSURE: 101 MMHG | HEART RATE: 89 BPM | TEMPERATURE: 97.6 F | WEIGHT: 225 LBS | OXYGEN SATURATION: 99 % | SYSTOLIC BLOOD PRESSURE: 155 MMHG

## 2023-04-20 DIAGNOSIS — J20.9 ACUTE BRONCHITIS, UNSPECIFIED ORGANISM: Primary | ICD-10-CM

## 2023-04-20 DIAGNOSIS — R05.1 ACUTE COUGH: ICD-10-CM

## 2023-04-20 PROCEDURE — 71046 X-RAY EXAM CHEST 2 VIEWS: CPT | Mod: TC | Performed by: RADIOLOGY

## 2023-04-20 PROCEDURE — 99213 OFFICE O/P EST LOW 20 MIN: CPT

## 2023-04-20 RX ORDER — PREDNISONE 20 MG/1
20 TABLET ORAL DAILY
Qty: 5 TABLET | Refills: 0 | Status: SHIPPED | OUTPATIENT
Start: 2023-04-20 | End: 2023-04-25

## 2023-04-20 NOTE — LETTER
April 20, 2023      Carlos A Crespo  [id number] [facility] [id number]        To Whom It May Concern:    Carlos A Crespo  was seen on April 20, 2023.  Please excuse him from work until April 24th due to illness.        Sincerely,        Kareem Márquez, ADRI CNP

## 2023-04-20 NOTE — PROGRESS NOTES
ASSESSMENT:  (J20.9) Acute bronchitis, unspecified organism  (primary encounter diagnosis)  Plan: predniSONE (DELTASONE) 20 MG tablet    (R05.1) Acute cough  Plan: XR Chest 2 Views    PLAN:  Informed the patient of the chest x-ray does not show any pneumonia per the radiologist report.  We discussed taking prednisone as prescribed and finishing the full course even if symptoms improve.  Prednisone patient instructions discussed and provided.  Work note provided.  Informed the patient to follow-up with his primary care provider should symptoms persist.  We also discussed the need to go to the emergency department with any new or worsening symptoms.  Patient acknowledged his understanding of the above plan.    The use of Dragon/PowerMic dictation services may have been used to construct the content in this note; any grammatical or spelling errors are non-intentional. Please contact the author of this note directly if you are in need of any clarification.      ADRI Carias CNP      SUBJECTIVE:  Carlos A Crespo is a 57 year old male who presents to the clinic today with a chief complaint of cough  for 15 day(s).  His cough is described as productive clear.  The patient's symptoms are severe and worsening.  Associated symptoms include congestion and nasal congestion. The patient's symptoms are exacerbated by working at the job while moving around.  Patient has been using inhaler, Robitussin AC, azithromycin and Tessalon to improve symptoms.    ROS  Negative except noted above.     OBJECTIVE:  BP (!) 155/101 (BP Location: Left arm, Patient Position: Sitting, Cuff Size: Adult Large)   Pulse 89   Temp 97.6  F (36.4  C) (Tympanic)   Wt 102.1 kg (225 lb)   SpO2 99%   BMI 32.28 kg/m    GENERAL APPEARANCE: healthy, alert and no distress  EYES: EOMI,  PERRL, conjunctiva clear  HENT: nose and mouth without erythema, ulcers or lesions and oral mucous membranes moist, no erythema noted  NECK: supple, nontender,  no lymphadenopathy  RESP: lungs clear to auscultation - no rales, rhonchi or wheezes  CV: regular rates and rhythm, normal S1 S2, no murmur noted  SKIN: no suspicious lesions or rashes

## 2023-04-21 NOTE — PATIENT INSTRUCTIONS
Chest x-ray does not show any pneumonia per the radiologist report.  Take prednisone as prescribed and finish the full course even if symptoms improve.  Follow up with your primary care provider should symptoms persist.

## 2023-08-31 ENCOUNTER — PATIENT OUTREACH (OUTPATIENT)
Dept: CARE COORDINATION | Facility: CLINIC | Age: 58
End: 2023-08-31
Payer: COMMERCIAL

## 2023-09-01 ENCOUNTER — TELEPHONE (OUTPATIENT)
Dept: FAMILY MEDICINE | Facility: CLINIC | Age: 58
End: 2023-09-01
Payer: COMMERCIAL

## 2023-09-01 NOTE — TELEPHONE ENCOUNTER
Patient Quality Outreach    Patient is due for the following:   Hypertension -  BP check      Topic Date Due    Hepatitis B Vaccine (1 of 3 - 3-dose series) Never done    COVID-19 Vaccine (4 - Pfizer series) 05/07/2022    Flu Vaccine (1) 09/01/2023    Zoster (Shingles) Vaccine (2 of 2) 03/17/2023       Next Steps:   Schedule a office visit for hypertension    Type of outreach:    Sent [employer] message.      Questions for provider review:    None           Priti Patterson MA

## 2023-09-26 DIAGNOSIS — I10 HYPERTENSION GOAL BP (BLOOD PRESSURE) < 140/90: ICD-10-CM

## 2023-09-26 RX ORDER — AMLODIPINE BESYLATE 10 MG/1
10 TABLET ORAL DAILY
Qty: 90 TABLET | Refills: 0 | Status: SHIPPED | OUTPATIENT
Start: 2023-09-26 | End: 2023-12-12

## 2023-10-12 NOTE — TELEPHONE ENCOUNTER
Pt had COVID at the end of August. Pt has questions regarding flu shot and shingles vaccine and would like to know how long she should wait until receiving immunizations or if she even needs to wait at all? Please advise. Pt is calling.    Was seen in urgent care Wednesday night. Diagnosed with Bronchitis. Calling regarding COVID results.      Coronavirus (COVID-19) Notification     Reason for call  Patient requesting results     Lab Result    Lab test 2019-nCoV rRt-PCR in process        RN Recommendations/Instructions per [facility]  Continue to quarantine and follow the instructions given at your testing visit until you receive the results.     Please Contact your PCP clinic or return to the Emergency department if your:    Symptoms worsen or other concerning symptom's.     Patient informed that if test for COVID19 is POSITIVE,  you will receive a call typically within 48 hours from the test date (date lab collected).  If NEGATIVE result, you will receive a letter in the mail or [employer].      Carlee Mayfield

## 2023-12-12 ENCOUNTER — OFFICE VISIT (OUTPATIENT)
Dept: FAMILY MEDICINE | Facility: CLINIC | Age: 58
End: 2023-12-12
Payer: COMMERCIAL

## 2023-12-12 VITALS
HEART RATE: 83 BPM | DIASTOLIC BLOOD PRESSURE: 87 MMHG | SYSTOLIC BLOOD PRESSURE: 131 MMHG | HEIGHT: 71 IN | WEIGHT: 221.1 LBS | TEMPERATURE: 97.9 F | OXYGEN SATURATION: 99 % | BODY MASS INDEX: 30.95 KG/M2 | RESPIRATION RATE: 19 BRPM

## 2023-12-12 DIAGNOSIS — Z00.00 ROUTINE GENERAL MEDICAL EXAMINATION AT A HEALTH CARE FACILITY: Primary | ICD-10-CM

## 2023-12-12 DIAGNOSIS — I10 HYPERTENSION GOAL BP (BLOOD PRESSURE) < 140/90: ICD-10-CM

## 2023-12-12 DIAGNOSIS — R05.2 SUBACUTE COUGH: ICD-10-CM

## 2023-12-12 DIAGNOSIS — Z13.220 SCREENING CHOLESTEROL LEVEL: ICD-10-CM

## 2023-12-12 DIAGNOSIS — Z12.5 PROSTATE CANCER SCREENING: ICD-10-CM

## 2023-12-12 PROCEDURE — 99396 PREV VISIT EST AGE 40-64: CPT | Performed by: FAMILY MEDICINE

## 2023-12-12 PROCEDURE — 80061 LIPID PANEL: CPT | Performed by: FAMILY MEDICINE

## 2023-12-12 PROCEDURE — G0103 PSA SCREENING: HCPCS | Performed by: FAMILY MEDICINE

## 2023-12-12 PROCEDURE — 80053 COMPREHEN METABOLIC PANEL: CPT | Performed by: FAMILY MEDICINE

## 2023-12-12 PROCEDURE — 36415 COLL VENOUS BLD VENIPUNCTURE: CPT | Performed by: FAMILY MEDICINE

## 2023-12-12 RX ORDER — PREDNISONE 20 MG/1
TABLET ORAL
Qty: 15 TABLET | Refills: 0 | Status: SHIPPED | OUTPATIENT
Start: 2023-12-12 | End: 2023-12-29

## 2023-12-12 RX ORDER — AMLODIPINE BESYLATE 10 MG/1
10 TABLET ORAL DAILY
Qty: 90 TABLET | Refills: 3 | Status: SHIPPED | OUTPATIENT
Start: 2023-12-12 | End: 2024-05-21

## 2023-12-12 RX ORDER — AMLODIPINE BESYLATE 10 MG/1
10 TABLET ORAL DAILY
Qty: 90 TABLET | Refills: 0 | Status: SHIPPED | OUTPATIENT
Start: 2023-12-12 | End: 2023-12-12

## 2023-12-12 RX ORDER — ALBUTEROL SULFATE 90 UG/1
2 AEROSOL, METERED RESPIRATORY (INHALATION) EVERY 6 HOURS PRN
Qty: 18 G | Refills: 2 | Status: SHIPPED | OUTPATIENT
Start: 2023-12-12

## 2023-12-12 RX ORDER — AMLODIPINE BESYLATE 10 MG/1
10 TABLET ORAL DAILY
Qty: 90 TABLET | Refills: 0 | Status: CANCELLED | OUTPATIENT
Start: 2023-12-12

## 2023-12-12 ASSESSMENT — ENCOUNTER SYMPTOMS
COUGH: 1
HEMATURIA: 0
DIZZINESS: 0
DIARRHEA: 0
JOINT SWELLING: 0
CONSTIPATION: 0
HEADACHES: 0
EYE PAIN: 0
FEVER: 0
FREQUENCY: 0
CHILLS: 0
ARTHRALGIAS: 0
HEMATOCHEZIA: 0
ABDOMINAL PAIN: 0
HEARTBURN: 0
MYALGIAS: 1
NERVOUS/ANXIOUS: 0

## 2023-12-12 NOTE — PATIENT INSTRUCTIONS
Preventive Health Recommendations  Male Ages 50 - 64    Yearly exam:             See your health care provider every year in order to  o   Review health changes.   o   Discuss preventive care.    o   Review your medicines if your doctor has prescribed any.   Have a cholesterol test every 5 years, or more frequently if you are at risk for high cholesterol/heart disease.   Have a diabetes test (fasting glucose) every three years. If you are at risk for diabetes, you should have this test more often.   Have a colonoscopy at age 45, or have a yearly FIT test (stool test). These exams will check for colon cancer.    Talk with your health care provider about whether or not a prostate cancer screening test (PSA) is right for you.  You should be tested each year for STDs (sexually transmitted diseases), if you re at risk.     Shots: Get a flu shot each year. Get a tetanus shot every 10 years.     Nutrition:  Eat at least 5 servings of fruits and vegetables daily.   Eat whole-grain bread, whole-wheat pasta and brown rice instead of white grains and rice.   Get adequate Calcium and Vitamin D.     Lifestyle  Exercise for at least 150 minutes a week (30 minutes a day, 5 days a week). This will help you control your weight and prevent disease.   Limit alcohol to one drink per day.   No smoking.   Wear sunscreen to prevent skin cancer.   See your dentist every six months for an exam and cleaning.   See your eye doctor every 1 to 2 years.      Cough:  1) course of steroid (prednisone) -should help with any allergy or environmental irritant  2) let me know by Friday or Monday if this helps or not.  3) if it does we might change up your inhaler or consider trying a different medicine called Singulair that is designed to help with allergies, etc.  If it does not help then I have some other ideas, including possibly changing the blood pressure medication (although I really do not think that is the issue)  4) routine lab work always  keeps an eye on kidneys, liver, etc.

## 2023-12-12 NOTE — PROGRESS NOTES
SUBJECTIVE:   Carlos A is a 58 year old, presenting for the following:  Physical        12/12/2023     4:20 PM   Additional Questions   Roomed by Priti   Accompanied by Self         12/12/2023     4:20 PM   Patient Reported Additional Medications   Patient reports taking the following new medications none       Healthy Habits:     Getting at least 3 servings of Calcium per day:  NO    Bi-annual eye exam:  Yes    Dental care twice a year:  NO    Sleep apnea or symptoms of sleep apnea:  None    Diet:  Low salt    Frequency of exercise:  1 day/week    Duration of exercise:  Less than 15 minutes    Taking medications regularly:  Yes    Medication side effects:  None    Additional concerns today:  Yes        Social History     Tobacco Use    Smoking status: Never    Smokeless tobacco: Never   Substance Use Topics    Alcohol use: Yes     Comment: occassional - 1 to 2 times a year             12/12/2023     3:34 PM   Alcohol Use   Prescreen: >3 drinks/day or >7 drinks/week? No          No data to display                Last PSA:   PSA   Date Value Ref Range Status   11/05/2019 0.85 0 - 4 ug/L Final     Comment:     Assay Method:  Chemiluminescence using Siemens Vista analyzer     Prostate Specific Antigen Screen   Date Value Ref Range Status   09/30/2022 0.68 0.00 - 4.00 ug/L Final       Reviewed orders with patient. Reviewed health maintenance and updated orders accordingly - Yes  Labs reviewed in EPIC  BP Readings from Last 3 Encounters:   12/12/23 131/87   04/20/23 (!) 155/101   04/10/23 (!) 151/107    Wt Readings from Last 3 Encounters:   12/12/23 100.3 kg (221 lb 1.6 oz)   04/20/23 102.1 kg (225 lb)   04/10/23 102.1 kg (225 lb)                    Reviewed and updated as needed this visit by clinical staff   Tobacco  Allergies  Meds  Problems  Med Hx  Surg Hx  Fam Hx          Reviewed and updated as needed this visit by Provider   Tobacco  Allergies  Meds  Problems  Med Hx  Surg Hx  Fam Hx         Here today  "for routine checkup and follow-up on hypertension.  But he notes an issue that has been going on for quite some time related to ongoing cough.  Details as below.    Review of Systems   Constitutional:  Negative for chills and fever.   HENT:  Positive for congestion. Negative for ear pain and hearing loss.    Eyes:  Negative for pain.   Respiratory:  Positive for cough.    Cardiovascular:  Positive for chest pain. Negative for peripheral edema.   Gastrointestinal:  Negative for abdominal pain, constipation, diarrhea, heartburn and hematochezia.   Genitourinary:  Positive for impotence. Negative for frequency, genital sores, hematuria and penile discharge.   Musculoskeletal:  Positive for myalgias. Negative for arthralgias and joint swelling.   Neurological:  Negative for dizziness and headaches.   Psychiatric/Behavioral:  Negative for mood changes. The patient is not nervous/anxious.        OBJECTIVE:   /87 (BP Location: Right arm, Patient Position: Sitting, Cuff Size: Adult Large)   Pulse 83   Temp 97.9  F (36.6  C) (Oral)   Resp 19   Ht 1.81 m (5' 11.26\")   Wt 100.3 kg (221 lb 1.6 oz)   SpO2 99%   BMI 30.61 kg/m      Physical Exam  GENERAL: healthy, alert and no distress  EYES: Eyes grossly normal to inspection, PERRL and conjunctivae and sclerae normal  HENT: ear canals and TM's normal, nose and mouth without ulcers or lesions  NECK: no adenopathy, no asymmetry, masses, or scars and thyroid normal to palpation  RESP: lungs clear to auscultation - no rales, rhonchi or wheezes  CV: regular rate and rhythm, normal S1 S2, no S3 or S4, no murmur, click or rub, no peripheral edema and peripheral pulses strong  ABDOMEN: soft, nontender, no hepatosplenomegaly, no masses and bowel sounds normal  MS: no gross musculoskeletal defects noted, no edema  SKIN: no suspicious lesions or rashes  NEURO: Normal strength and tone, mentation intact and speech normal  PSYCH: mentation appears normal, affect " normal/bright    Diagnostic Test Results:  Labs reviewed in Epic    ASSESSMENT/PLAN:   (Z00.00) Routine general medical examination at a health care facility  (primary encounter diagnosis)  Comment: Routine health maintenance otherwise up-to-date.  Discussed scheduling his second shingles vaccination  Plan:     (I10) Hypertension goal BP (blood pressure) < 140/90  Comment: Stable on current regimen.  Continue same plan and routine follow-up.   Plan: Comprehensive metabolic panel, amLODIPine         (NORVASC) 10 MG tablet, DISCONTINUED:         amLODIPine (NORVASC) 10 MG tablet            (R05.2) Subacute cough  Comment: This cough has been an issue for a couple of years.  Some of that I have known about some of it he has seen other providers and been diagnosed with a variety of things.  Even had an issue of a couple of broken ribs 2 years ago that he relates to coughing.  I do not think it is the amlodipine as that would be unusual.  He mentions that things are little bit worse at work and there are a lot of chemicals and things around so we talked about irritants in the lungs.  Has been using his inhaler with some regularity and it helps a little.  So I do not have a clear-cut answer at this point but what I want to do is a trial of prednisone for 1 week to see if this helps.  If it does I might consider a controller inhaler or Singulair as a baseline agent.  If it does not help then we may want to get him in with pulmonary to ask about testing.  Plan: predniSONE (DELTASONE) 20 MG tablet, albuterol         (PROAIR HFA/PROVENTIL HFA/VENTOLIN HFA) 108 (90        Base) MCG/ACT inhaler            (Z13.220) Screening cholesterol level  Comment:  Plan: Lipid panel reflex to direct LDL Non-fasting            (Z12.5) Prostate cancer screening  Comment:   Plan: Prostate Specific Antigen Screen            Patient has been advised of split billing requirements and indicates understanding: Yes      COUNSELING:   Reviewed  preventive health counseling, as reflected in patient instructions       Regular exercise       Healthy diet/nutrition       Vision screening       Colorectal cancer screening       Prostate cancer screening        He reports that he has never smoked. He has never used smokeless tobacco.            Frances Upton MD  [facility]

## 2023-12-13 LAB
ALBUMIN SERPL BCG-MCNC: 4.6 G/DL (ref 3.5–5.2)
ALP SERPL-CCNC: 90 U/L (ref 40–150)
ALT SERPL W P-5'-P-CCNC: 20 U/L (ref 0–70)
ANION GAP SERPL CALCULATED.3IONS-SCNC: 11 MMOL/L (ref 7–15)
AST SERPL W P-5'-P-CCNC: 20 U/L (ref 0–45)
BILIRUB SERPL-MCNC: 0.4 MG/DL
BUN SERPL-MCNC: 13.2 MG/DL (ref 6–20)
CALCIUM SERPL-MCNC: 9.1 MG/DL (ref 8.6–10)
CHLORIDE SERPL-SCNC: 102 MMOL/L (ref 98–107)
CHOLEST SERPL-MCNC: 243 MG/DL
CREAT SERPL-MCNC: 0.91 MG/DL (ref 0.67–1.17)
DEPRECATED HCO3 PLAS-SCNC: 26 MMOL/L (ref 22–29)
EGFRCR SERPLBLD CKD-EPI 2021: >90 ML/MIN/1.73M2
FASTING STATUS PATIENT QL REPORTED: NO
GLUCOSE SERPL-MCNC: 101 MG/DL (ref 70–99)
HDLC SERPL-MCNC: 52 MG/DL
LDLC SERPL CALC-MCNC: 179 MG/DL
NONHDLC SERPL-MCNC: 191 MG/DL
POTASSIUM SERPL-SCNC: 4 MMOL/L (ref 3.4–5.3)
PROT SERPL-MCNC: 8.1 G/DL (ref 6.4–8.3)
PSA SERPL DL<=0.01 NG/ML-MCNC: 0.78 NG/ML (ref 0–3.5)
SODIUM SERPL-SCNC: 139 MMOL/L (ref 135–145)
TRIGL SERPL-MCNC: 59 MG/DL

## 2023-12-16 ENCOUNTER — NURSE TRIAGE (OUTPATIENT)
Dept: NURSING | Facility: CLINIC | Age: 58
End: 2023-12-16
Payer: COMMERCIAL

## 2023-12-16 NOTE — TELEPHONE ENCOUNTER
Nurse Triage SBAR    Is this a 2nd Level Triage? YES, LICENSED PRACTITIONER REVIEW IS REQUIRED    Situation: Carlos A calling reporting leg weakness, all over shakiness, hoarse voice, mood changes and dizziness.    Background: Started prednisone for cough 12/12/2023    Assessment: States no improvement of cough on prednisone. Inhaler is helping breathing. Oxygen sats are 95%-98% and Pulse is in the 80s. Reports he almost fell today due to weakness. Better after rest.    Protocol Recommended Disposition:   No disposition on file.    Recommendation: Page on-call     Paged to provider Paged Dr Chairez     Does the patient meet one of the following criteria for ADS visit consideration? 16+ years old, with an [facility] PCP     TIP  Providers, please consider if this condition is appropriate for management at one of our Acute and Diagnostic Services sites.     If patient is a good candidate, please use dotphrase <dot>triageresponse and select Refer to ADS to document.  Provider Recommendation Follow Up:   Reached patient/caregiver. Informed of provider's recommendations. Patient verbalized understanding and agrees with the plan.   Carlos A requesting information sent to PCP.    Rosaura Mason RN on 12/16/2023 at 5:35 PM    Reason for Disposition   [1] MODERATE weakness (i.e., interferes with work, school, normal activities) AND [2] cause unknown  (Exceptions: Weakness from acute minor illness or poor fluid intake; weakness is chronic and not worse.)    Additional Information   Negative: SEVERE difficulty breathing (e.g., struggling for each breath, speaks in single words)   Negative: Shock suspected (e.g., cold/pale/clammy skin, too weak to stand, low BP, rapid pulse)   Negative: Difficult to awaken or acting confused (e.g., disoriented, slurred speech)   Negative: [1] Fainted > 15 minutes ago AND [2] still feels too weak or dizzy to stand   Negative: [1] SEVERE weakness (i.e., unable to walk or barely able to walk, requires  "support) AND [2] new-onset or worsening   Negative: Sounds like a life-threatening emergency to the triager   Negative: Difficulty breathing   Negative: Heart beating < 50 beats per minute OR > 140 beats per minute   Negative: Extra heartbeats, irregular heart beating, or heart is beating very fast  (i.e., \"palpitations\")   Negative: Follows large amount of bleeding (e.g., from vomiting, rectum, vagina  (Exception: Small brief weakness from sight of a small amount blood.)   Negative: Black or tarry bowel movements   Negative: [1] Drinking very little AND [2] dehydration suspected (e.g., no urine > 12 hours, very dry mouth, very lightheaded)   Negative: Patient sounds very sick or weak to the triager    Protocols used: Weakness (Generalized) and Fatigue-A-AH    "

## 2023-12-18 ENCOUNTER — MYC MEDICAL ADVICE (OUTPATIENT)
Dept: FAMILY MEDICINE | Facility: CLINIC | Age: 58
End: 2023-12-18
Payer: COMMERCIAL

## 2023-12-18 NOTE — TELEPHONE ENCOUNTER
See Message below. Routing to PCP per patient request. Please review and further advise if appropriate.     KIERAN Booker  [facility]

## 2023-12-18 NOTE — TELEPHONE ENCOUNTER
See OV notes 12/12/2023 and nurse triage call 12/16/2023.     KIERAN Gauthier  [facility] Primary [facility] Triage

## 2023-12-18 NOTE — RESULT ENCOUNTER NOTE
Carlos A,  PSA looks great.  Kidney and liver look fine.  And, no, I am not worried about that sugar level.  But I am kind of worried about the cholesterol.  It continues to be high and is definitely higher this year than it has been recently.  This is the highest since we saw even higher about 7 years ago.  So the question is whether this is something that can be managed through lifestyle or not.  We could always consider a low-dose of a cholesterol medicine.  I myself take 1 called Crestor at a very low dose and it works pretty well for my cholesterol levels.  So give it some thought and let me know.  AXEL Upton M.D.

## 2023-12-18 NOTE — TELEPHONE ENCOUNTER
"Called patient, relayed provider's message below. Patient states he is \"a little tired from not getting much sleep\" and states he did stop prednisone. Advise patient to check with insurance to see pulmonary providers in network and follow up with specialist. Patient verbalized understanding.     KIERAN Booker  [facility]  [place]      "

## 2023-12-18 NOTE — TELEPHONE ENCOUNTER
Obviously the key is to stop the prednisone, so hopefully he has done that already.  We had only started it as a trial to see if it would help his chronic cough.  So the only other thing that I can think of, and I believe we talked about this, is getting him in with pulmonary at some point.  Because I do not really have any other ideas.

## 2023-12-21 ENCOUNTER — OFFICE VISIT (OUTPATIENT)
Dept: URGENT CARE | Facility: URGENT CARE | Age: 58
End: 2023-12-21
Payer: COMMERCIAL

## 2023-12-21 ENCOUNTER — ANCILLARY PROCEDURE (OUTPATIENT)
Dept: GENERAL RADIOLOGY | Facility: CLINIC | Age: 58
End: 2023-12-21
Attending: PHYSICIAN ASSISTANT
Payer: COMMERCIAL

## 2023-12-21 VITALS
DIASTOLIC BLOOD PRESSURE: 97 MMHG | HEART RATE: 79 BPM | OXYGEN SATURATION: 96 % | BODY MASS INDEX: 30.7 KG/M2 | TEMPERATURE: 97.3 F | WEIGHT: 221.7 LBS | SYSTOLIC BLOOD PRESSURE: 159 MMHG

## 2023-12-21 DIAGNOSIS — M25.561 ACUTE PAIN OF RIGHT KNEE: ICD-10-CM

## 2023-12-21 DIAGNOSIS — M25.461 EFFUSION OF RIGHT KNEE: ICD-10-CM

## 2023-12-21 DIAGNOSIS — M25.561 ACUTE PAIN OF RIGHT KNEE: Primary | ICD-10-CM

## 2023-12-21 PROCEDURE — 73562 X-RAY EXAM OF KNEE 3: CPT | Mod: TC | Performed by: RADIOLOGY

## 2023-12-21 PROCEDURE — 99213 OFFICE O/P EST LOW 20 MIN: CPT | Performed by: PHYSICIAN ASSISTANT

## 2023-12-21 ASSESSMENT — ENCOUNTER SYMPTOMS
CHILLS: 0
DYSURIA: 0
NAUSEA: 0
CARDIOVASCULAR NEGATIVE: 1
NEUROLOGICAL NEGATIVE: 1
FREQUENCY: 0
CHEST TIGHTNESS: 0
COUGH: 0
DIARRHEA: 0
ABDOMINAL PAIN: 0
CONSTITUTIONAL NEGATIVE: 1
PALPITATIONS: 0
ARTHRALGIAS: 1
GASTROINTESTINAL NEGATIVE: 1
MYALGIAS: 0
ALLERGIC/IMMUNOLOGIC NEGATIVE: 1
WHEEZING: 0
VOMITING: 0
HEMATURIA: 0
SHORTNESS OF BREATH: 0
FEVER: 0
RESPIRATORY NEGATIVE: 1
SORE THROAT: 0
HEADACHES: 0

## 2023-12-27 NOTE — PROGRESS NOTES
chief complaint:   Chief Complaint   Patient presents with    Right Knee - Pain       HISTORY OF PRESENT ILLNESS    Carlos A Crespo is a 58 year old male seen for evaluation of a right knee injury that occurred 9 days ago. The injury occurred during bowling. He hadn't bowled in 6 years and was in the middle of his 3rd game and felt a pull in the back of the knee after he bowled and was limping the rest of the game. Went to  the next day, given crutches and immobilizer and following up here. Reports that he is feeling much better today    Pain severity: 1/10     NSAIDS: Yes      Physical Therapy: No      Activity modification: Yes      Bracing: Yes immobili     Injections: No      Ice: Yes      Other: Tylenol      Usual level of recreational activity: occasional light sports  Usual level of work activity: walking  - flat surfaces, [employer]    Orthopedic PMH: left ankle fracture treated non-op, 10+ yrs ago    Other PMH:  has a past medical history of Allergic rhinitis, cause unspecified, Arthritis, Cancer (H), Carpal tunnel syndrome, COPD (chronic obstructive pulmonary disease) (H), Diabetes (H), Hypertension, Infection due to 2019 novel coronavirus (04/17/2022), Low back pain, and Scoliosis of thoracic spine.    He has no past medical history of Cerebral infarction (H), Congestive heart failure (H), Depressive disorder, Heart disease, History of blood transfusion, Thyroid disease, or Uncomplicated asthma.    Surgical Hx:  has a past surgical history that includes no history of surgery.    Medications:   Current Outpatient Medications:     albuterol (PROAIR HFA/PROVENTIL HFA/VENTOLIN HFA) 108 (90 Base) MCG/ACT inhaler, Inhale 2 puffs into the lungs every 6 hours as needed for shortness of breath, wheezing or cough, Disp: 18 g, Rfl: 2    amLODIPine (NORVASC) 10 MG tablet, Take 1 tablet (10 mg) by mouth daily, Disp: 90 tablet, Rfl: 3    cetirizine (ZYRTEC) 10 MG tablet, Take 10 mg by mouth daily, Disp: , Rfl:      "predniSONE (DELTASONE) 20 MG tablet, 3 tabs daily for 3 days, 2 tabs x 2 days, 1 tab x 2 days, Disp: 15 tablet, Rfl: 0    Allergies:   Allergies   Allergen Reactions    Influenza Vac Split [Influenza Virus Vaccine]     Tetracycline     Ace Inhibitors Cough     Unsure as to which medication was used but probably Lisinopril. Does remember having a cough with one of the medications in the past.       Social Hx:  reports that he has never smoked. He has never used smokeless tobacco. He reports current alcohol use. He reports that he does not use drugs.    Family Hx: family history includes Breast Cancer in his mother; Diabetes in his father and maternal uncle; Hypertension in his father and mother; Pulmonary Embolism in his father.    REVIEW OF SYSTEMS: 10 point ROS neg other than the symptoms noted above in the HPI and PMH. Notables include  CONSTITUTIONAL:NEGATIVE for fever, chills, change in weight  INTEGUMENTARY/SKIN: NEGATIVE for worrisome rashes, moles or lesions  MUSCULOSKELETAL:See HPI above  NEURO: NEGATIVE for weakness, dizziness or paresthesias    PHYSICAL EXAM:  Ht 1.81 m (5' 11.26\")   Wt 100.2 kg (221 lb)   BMI 30.60 kg/m     GENERAL APPEARANCE: healthy, alert, no distress  SKIN: no suspicious lesions or rashes  NEURO: Normal strength and tone, mentation intact and speech normal  PSYCH:  mentation appears normal and affect normal/bright, not anxious  RESPIRATORY: No increased work of breathing.    Hands: no clubbing, nail pitting or nodes.    BILATERAL LOWER EXTREMITIES:  Gait: immobilizer and crutches  Alignment: slight varus.  No gross deformities or masses.  No Quad atrophy, strength normal.  Intact sensation deep peroneal nerve, superficial peroneal nerve, med/lat tibial nerve, sural nerve, saphenous nerve  Intact EHL, EDL, TA, FHL, GS, quadriceps hamstrings and hip flexors  Toes warm and well perfused, brisk capillary refill. Palpable 2+ dp pulses.  Bilateral calf soft and nttp or squeeze.  No " palpable popliteal lymphadenopathy.  DTRs: achilles 2+, patella 2+.  Edema: none    LEFT KNEE EXAM:    Skin: intact, no ecchymosis or erythema  Squat: 100 %, not limited by pain.     ROM: 0 extension to 125 flexion  Tight hamstrings on straight leg raise.  Effusion: none  Tender: NTTP med/lat joint line, anterior or posterior knee  McMurrays: negative    Valgus stress/MCL: stable, and non-painful at both 0 and 30 degrees knee flexion  Varus stress: stable, and non-painful at both 0 and 30 degrees knee flexion  Lachmans: neg, firm endpoint  Posterior Drawer stable  Sag: negative  Patellofemoral joint:                Extensor Lag: none              Q Angle: normal              Patellar Mobility: normal              Apprehension: negative              Crepitations: minimal    RIGHT KNEE EXAM:    Skin: intact, no ecchymosis or erythema  Squat: 100 %, not limited by pain.     ROM: 0 extension to 115 flexion  Tight hamstrings on straight leg raise.  Effusion: none  Tender: mild - popliteal fossa  McMurrays: negative    Valgus stress/MCL: stable, and non-painful at both 0 and 30 degrees knee flexion  Varus stress: stable, and non-painful at both 0 and 30 degrees knee flexion  Lachmans: neg, firm endpoint  Posterior Drawer stable  Sag: negative, Quadriceps-active negative    Patellofemoral joint:                Extensor Lag: none              Q Angle: normal              Patellar Mobility: normal              Apprehension: negative              Crepitations: minimal    X-RAY:  3 views right knee from 12/21/2023 were reviewed in clinic today.   Impression: Normal joint alignment and spacing. No fracture or joint effusion.  Few small ossified bodies along the posterior aspect of the knee  joint.      Impression:  59 y/o male with right knee hamstring strain    Plan:   Ok to discontinue immobilizer and crutches  * rest  * Activity modification - avoid impact activities or activities that aggravate symptoms.  * NSAIDS - regular  "use for inflammation ( twice daily or three times daily), with food, as long as no contra-indications Please discuss with pcp if needed  * ice, 15-20 minutes at a time several times a day or as needed.  *Elevate affected extremity above level of heart. \"Toes above nose\"  * Strengthening of quadriceps muscles  * Tylenol as needed for pain  *Ok to return to activity and bowling. Encouraged a lot of pre and post exercise stretching    * Return to clinic as needed.     Shant Morejon PA-C, CAQ-OS  Dept. of Orthopedic Surgery  [facility] [place]    "

## 2023-12-29 ENCOUNTER — OFFICE VISIT (OUTPATIENT)
Dept: ORTHOPEDICS | Facility: CLINIC | Age: 58
End: 2023-12-29
Attending: PHYSICIAN ASSISTANT
Payer: COMMERCIAL

## 2023-12-29 VITALS — WEIGHT: 221 LBS | HEIGHT: 71 IN | BODY MASS INDEX: 30.94 KG/M2

## 2023-12-29 DIAGNOSIS — M25.561 ACUTE PAIN OF RIGHT KNEE: ICD-10-CM

## 2023-12-29 DIAGNOSIS — M25.461 EFFUSION OF RIGHT KNEE: ICD-10-CM

## 2023-12-29 PROCEDURE — 99203 OFFICE O/P NEW LOW 30 MIN: CPT | Performed by: PHYSICIAN ASSISTANT

## 2023-12-29 ASSESSMENT — PAIN SCALES - GENERAL: PAINLEVEL: NO PAIN (1)

## 2023-12-29 NOTE — LETTER
12/29/2023         RE: Carlos A Crespo  [address] [facility] [id number]        Dear Colleague,    Thank you for referring your patient, Carlos A Crespo, to the [facility]. Please see a copy of my visit note below.    chief complaint:   Chief Complaint   Patient presents with     Right Knee - Pain       HISTORY OF PRESENT ILLNESS    Carlos A Crespo is a 58 year old male seen for evaluation of a right knee injury that occurred 9 days ago. The injury occurred during bowling. He hadn't bowled in 6 years and was in the middle of his 3rd game and felt a pull in the back of the knee after he bowled and was limping the rest of the game. Went to  the next day, given crutches and immobilizer and following up here. Reports that he is feeling much better today    Pain severity: 1/10     NSAIDS: Yes      Physical Therapy: No      Activity modification: Yes      Bracing: Yes immobili     Injections: No      Ice: Yes      Other: Tylenol      Usual level of recreational activity: occasional light sports  Usual level of work activity: walking  - [employer]    Orthopedic PMH: left ankle fracture treated non-op, 10+ yrs ago    Other PMH:  has a past medical history of Allergic rhinitis, cause unspecified, Arthritis, Cancer (H), Carpal tunnel syndrome, COPD (chronic obstructive pulmonary disease) (H), Diabetes (H), Hypertension, Infection due to 2019 novel coronavirus (04/17/2022), Low back pain, and Scoliosis of thoracic spine.    He has no past medical history of Cerebral infarction (H), Congestive heart failure (H), Depressive disorder, Heart disease, History of blood transfusion, Thyroid disease, or Uncomplicated asthma.    Surgical Hx:  has a past surgical history that includes no history of surgery.    Medications:   Current Outpatient Medications:      albuterol (PROAIR HFA/PROVENTIL HFA/VENTOLIN HFA) 108 (90 Base) MCG/ACT inhaler, Inhale 2 puffs into the lungs  "every 6 hours as needed for shortness of breath, wheezing or cough, Disp: 18 g, Rfl: 2     amLODIPine (NORVASC) 10 MG tablet, Take 1 tablet (10 mg) by mouth daily, Disp: 90 tablet, Rfl: 3     cetirizine (ZYRTEC) 10 MG tablet, Take 10 mg by mouth daily, Disp: , Rfl:      predniSONE (DELTASONE) 20 MG tablet, 3 tabs daily for 3 days, 2 tabs x 2 days, 1 tab x 2 days, Disp: 15 tablet, Rfl: 0    Allergies:   Allergies   Allergen Reactions     Influenza Vac Split [Influenza Virus Vaccine]      Tetracycline      Ace Inhibitors Cough     Unsure as to which medication was used but probably Lisinopril. Does remember having a cough with one of the medications in the past.       Social Hx:  reports that he has never smoked. He has never used smokeless tobacco. He reports current alcohol use. He reports that he does not use drugs.    Family Hx: family history includes Breast Cancer in his mother; Diabetes in his father and maternal uncle; Hypertension in his father and mother; Pulmonary Embolism in his father.    REVIEW OF SYSTEMS: 10 point ROS neg other than the symptoms noted above in the HPI and PMH. Notables include  CONSTITUTIONAL:NEGATIVE for fever, chills, change in weight  INTEGUMENTARY/SKIN: NEGATIVE for worrisome rashes, moles or lesions  MUSCULOSKELETAL:See HPI above  NEURO: NEGATIVE for weakness, dizziness or paresthesias    PHYSICAL EXAM:  Ht 1.81 m (5' 11.26\")   Wt 100.2 kg (221 lb)   BMI 30.60 kg/m     GENERAL APPEARANCE: healthy, alert, no distress  SKIN: no suspicious lesions or rashes  NEURO: Normal strength and tone, mentation intact and speech normal  PSYCH:  mentation appears normal and affect normal/bright, not anxious  RESPIRATORY: No increased work of breathing.    Hands: no clubbing, nail pitting or nodes.    BILATERAL LOWER EXTREMITIES:  Gait: immobilizer and crutches  Alignment: slight varus.  No gross deformities or masses.  No Quad atrophy, strength normal.  Intact sensation deep peroneal nerve, " superficial peroneal nerve, med/lat tibial nerve, sural nerve, saphenous nerve  Intact EHL, EDL, TA, FHL, GS, quadriceps hamstrings and hip flexors  Toes warm and well perfused, brisk capillary refill. Palpable 2+ dp pulses.  Bilateral calf soft and nttp or squeeze.  No palpable popliteal lymphadenopathy.  DTRs: achilles 2+, patella 2+.  Edema: none    LEFT KNEE EXAM:    Skin: intact, no ecchymosis or erythema  Squat: 100 %, not limited by pain.     ROM: 0 extension to 125 flexion  Tight hamstrings on straight leg raise.  Effusion: none  Tender: NTTP med/lat joint line, anterior or posterior knee  McMurrays: negative    Valgus stress/MCL: stable, and non-painful at both 0 and 30 degrees knee flexion  Varus stress: stable, and non-painful at both 0 and 30 degrees knee flexion  Lachmans: neg, firm endpoint  Posterior Drawer stable  Sag: negative  Patellofemoral joint:                Extensor Lag: none              Q Angle: normal              Patellar Mobility: normal              Apprehension: negative              Crepitations: minimal    RIGHT KNEE EXAM:    Skin: intact, no ecchymosis or erythema  Squat: 100 %, not limited by pain.     ROM: 0 extension to 115 flexion  Tight hamstrings on straight leg raise.  Effusion: none  Tender: mild - popliteal fossa  McMurrays: negative    Valgus stress/MCL: stable, and non-painful at both 0 and 30 degrees knee flexion  Varus stress: stable, and non-painful at both 0 and 30 degrees knee flexion  Lachmans: neg, firm endpoint  Posterior Drawer stable  Sag: negative, Quadriceps-active negative    Patellofemoral joint:                Extensor Lag: none              Q Angle: normal              Patellar Mobility: normal              Apprehension: negative              Crepitations: minimal    X-RAY:  3 views right knee from 12/21/2023 were reviewed in clinic today.   Impression: Normal joint alignment and spacing. No fracture or joint effusion.  Few small ossified bodies along the  "posterior aspect of the knee  joint.      Impression:  57 y/o male with right knee hamstring strain    Plan:   Ok to discontinue immobilizer and crutches  * rest  * Activity modification - avoid impact activities or activities that aggravate symptoms.  * NSAIDS - regular use for inflammation ( twice daily or three times daily), with food, as long as no contra-indications Please discuss with pcp if needed  * ice, 15-20 minutes at a time several times a day or as needed.  *Elevate affected extremity above level of heart. \"Toes above nose\"  * Strengthening of quadriceps muscles  * Tylenol as needed for pain  *Ok to return to activity and bowling. Encouraged a lot of pre and post exercise stretching    * Return to clinic as needed.     Shant Morejon PA-C, CAQ-OS  Dept. of Orthopedic Surgery  [facility] [place]      Again, thank you for allowing me to participate in the care of your patient.        Sincerely,        GEORGETTE Berg  "

## 2024-02-17 ENCOUNTER — OFFICE VISIT (OUTPATIENT)
Dept: URGENT CARE | Facility: URGENT CARE | Age: 59
End: 2024-02-17
Payer: COMMERCIAL

## 2024-02-17 VITALS
TEMPERATURE: 97.6 F | DIASTOLIC BLOOD PRESSURE: 103 MMHG | OXYGEN SATURATION: 99 % | RESPIRATION RATE: 16 BRPM | WEIGHT: 220 LBS | BODY MASS INDEX: 30.46 KG/M2 | SYSTOLIC BLOOD PRESSURE: 173 MMHG | HEART RATE: 77 BPM

## 2024-02-17 DIAGNOSIS — H61.22 IMPACTED CERUMEN OF LEFT EAR: Primary | ICD-10-CM

## 2024-02-17 PROCEDURE — 69210 REMOVE IMPACTED EAR WAX UNI: CPT | Mod: LT | Performed by: PHYSICIAN ASSISTANT

## 2024-02-17 ASSESSMENT — ENCOUNTER SYMPTOMS
SORE THROAT: 0
CARDIOVASCULAR NEGATIVE: 1
CHEST TIGHTNESS: 0
CHILLS: 0
MUSCULOSKELETAL NEGATIVE: 1
SHORTNESS OF BREATH: 0
MYALGIAS: 0
VOMITING: 0
NAUSEA: 0
COUGH: 0
WHEEZING: 0
DYSURIA: 0
FEVER: 0
CONSTITUTIONAL NEGATIVE: 1
ABDOMINAL PAIN: 0
GASTROINTESTINAL NEGATIVE: 1
PALPITATIONS: 0
RESPIRATORY NEGATIVE: 1
HEMATURIA: 0
DIARRHEA: 0
HEADACHES: 0
ALLERGIC/IMMUNOLOGIC NEGATIVE: 1
FREQUENCY: 0
NEUROLOGICAL NEGATIVE: 1

## 2024-02-17 NOTE — PROGRESS NOTES
Chief Complaint:    Chief Complaint   Patient presents with    Ear Problem     Left ear feeling plugged for one month       ASSESSMENT    Vital signs reviewed by Ryan Fatima PA-C  BP (!) 173/103 (BP Location: Left arm, Patient Position: Sitting, Cuff Size: Adult Large)   Pulse 77   Temp 97.6  F (36.4  C) (Tympanic)   Resp 16   Wt 99.8 kg (220 lb)   SpO2 99%   BMI 30.46 kg/m       1. Impacted cerumen of left ear         PLAN    left ear has impacted cerumen.  This was lavaged, and impacted cerumen removed with curette by me.  TM visualized post impacted cerumen removal.  Patient tolerated this procedure well.      Fluids, vaporizer, acetaminophen, and or ibuprofen for pain.  Follow up with PCP if symptoms are not improving in 1 week. Sooner if symptoms worsen.   Worrisome symptoms discussed with instructions to go to the ED.  Patent verbalized understanding and agreed with this plan.     LABS:    No results found for any visits on 02/17/24.    Respiratory History  occasional episodes of bronchitis    Current Meds    Current Outpatient Medications:     albuterol (PROAIR HFA/PROVENTIL HFA/VENTOLIN HFA) 108 (90 Base) MCG/ACT inhaler, Inhale 2 puffs into the lungs every 6 hours as needed for shortness of breath, wheezing or cough, Disp: 18 g, Rfl: 2    amLODIPine (NORVASC) 10 MG tablet, Take 1 tablet (10 mg) by mouth daily, Disp: 90 tablet, Rfl: 3    cetirizine (ZYRTEC) 10 MG tablet, Take 10 mg by mouth daily, Disp: , Rfl:     Problem history  Patient Active Problem List   Diagnosis    Scoliosis of thoracic spine    Hypertension goal BP (blood pressure) < 140/90    Onychomycosis       Allergies  Allergies   Allergen Reactions    Influenza Vac Split [Influenza Virus Vaccine]     Tetracycline     Ace Inhibitors Cough     Unsure as to which medication was used but probably Lisinopril. Does remember having a cough with one of the medications in the past.       SUBJECTIVE    HPI:Carlos A Crespo is an 58 year old  male who presents for possible ear infection. Symptoms include ear pain on left and plugged sensation on left. Onset 1 week, gradually worsening since that time. Ear history: few episodes of otitis.    Patient is eating and drinking well.  No fever, diarrhea or vomiting.  No cough, or wheezing.    ROS:    Review of Systems   Constitutional: Negative.  Negative for chills and fever.   HENT:  Positive for ear pain. Negative for ear discharge and sore throat.         Ear Plugged   Respiratory: Negative.  Negative for cough, chest tightness, shortness of breath and wheezing.    Cardiovascular: Negative.  Negative for chest pain and palpitations.   Gastrointestinal: Negative.  Negative for abdominal pain, diarrhea, nausea and vomiting.   Genitourinary:  Negative for dysuria, frequency, hematuria and urgency.   Musculoskeletal: Negative.  Negative for myalgias.   Skin:  Negative for rash.   Allergic/Immunologic: Negative.  Negative for immunocompromised state.   Neurological: Negative.  Negative for headaches.        Family History   Family History   Problem Relation Age of Onset    Hypertension Mother     Breast Cancer Mother     Diabetes Father     Hypertension Father     Pulmonary Embolism Father     Diabetes Maternal Uncle     Cerebrovascular Disease No family hx of     Coronary Artery Disease No family hx of         Social History  Social History     Socioeconomic History    Marital status:      Spouse name: Tana    Number of children: 0    Years of education: Not on file    Highest education level: Not on file   Occupational History    Occupation: [employer] work.     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    Special Diet No    Back Care Yes     Comment: PT for ankle in past    Exercise Yes     Comment: Active at work Walks his dog 2 times at work    Bike Helmet No    Seat Belt Yes    Self-Exams No    Parent/sibling w/ CABG, MI or angioplasty before 65F 55M? Yes   Social History Narrative    Not on file     Social Determinants of Health     Financial Resource Strain: Unknown (12/12/2023)    Financial Resource Strain     Within the past 12 months, have you or your family members you live with been unable to get utilities (heat, electricity) when it was really needed?: Patient refused   Food Insecurity: Low Risk  (12/12/2023)    Food Insecurity     Within the past 12 months, did you worry that your food would run out before you got money to buy more?: No     Within the past 12 months, did the food you bought just not last and you didn t have money to get more?: No   Transportation Needs: Unknown (12/12/2023)    Transportation Needs     Within the past 12 months, has lack of transportation kept you from medical appointments, getting your medicines, non-medical meetings or appointments, work, or from getting things that you need?: Patient refused   Physical Activity: Not on file   Stress: Not on file   Social Connections: Not on file   Interpersonal Safety: Low Risk  (12/12/2023)    Interpersonal Safety     Do you feel physically and emotionally safe where you currently live?: Yes     Within the past 12 months, have you been hit, slapped, kicked or otherwise physically hurt by someone?: No     Within the past 12 months, have you been humiliated or emotionally abused in other ways by your partner or  ex-partner?: No   Housing Stability: Low Risk  (12/12/2023)    Housing Stability     Do you have housing? : Yes     Are you worried about losing your housing?: No        OBJECTIVE     Physical Exam:     Vital signs reviewed by Ryan Fatima PA-C  BP (!) 173/103 (BP Location: Left arm, Patient Position: Sitting, Cuff Size: Adult Large)   Pulse 77   Temp 97.6  F (36.4  C) (Tympanic)   Resp 16   Wt 99.8 kg (220 lb)   SpO2 99%   BMI 30.46 kg/m       Physical Exam:    Physical Exam  Vitals reviewed.   Constitutional:       General: He is not in acute distress.     Appearance: He is well-developed. He is not ill-appearing, toxic-appearing or diaphoretic.   HENT:      Head: Normocephalic and atraumatic.      Right Ear: Hearing, tympanic membrane, ear canal and external ear normal. No drainage, swelling or tenderness. There is no impacted cerumen. Tympanic membrane is not perforated, erythematous, retracted or bulging.      Left Ear: Hearing, tympanic membrane, ear canal and external ear normal. No drainage, swelling or tenderness. There is impacted cerumen. Tympanic membrane is not perforated, erythematous, retracted or bulging.      Nose: Congestion present. No nasal tenderness, mucosal edema or rhinorrhea.      Right Turbinates: Not enlarged or swollen.      Left Turbinates: Not enlarged or swollen.      Right Sinus: No maxillary sinus tenderness or frontal sinus tenderness.      Left Sinus: No maxillary sinus tenderness or frontal sinus tenderness.      Mouth/Throat:      Pharynx: No pharyngeal swelling, oropharyngeal exudate, posterior oropharyngeal erythema or uvula swelling.      Tonsils: No tonsillar exudate. 0 on the right. 0 on the left.   Eyes:      General: Lids are normal.         Right eye: No discharge.         Left eye: No discharge.      Conjunctiva/sclera: Conjunctivae normal.      Right eye: Right conjunctiva is not injected. No exudate.     Left eye: Left conjunctiva is not injected. No  exudate.     Pupils: Pupils are equal, round, and reactive to light.   Cardiovascular:      Rate and Rhythm: Normal rate and regular rhythm.      Heart sounds: Normal heart sounds. No murmur heard.     No friction rub. No gallop.   Pulmonary:      Effort: Pulmonary effort is normal. No accessory muscle usage, respiratory distress or retractions.      Breath sounds: Normal breath sounds and air entry. No stridor, decreased air movement or transmitted upper airway sounds. No decreased breath sounds, wheezing, rhonchi or rales.   Chest:      Chest wall: No tenderness.   Abdominal:      General: Bowel sounds are normal. There is no distension.      Palpations: Abdomen is soft. Abdomen is not rigid. There is no mass.      Tenderness: There is no abdominal tenderness. There is no guarding or rebound.   Musculoskeletal:         General: Normal range of motion.      Cervical back: Normal range of motion and neck supple.   Lymphadenopathy:      Head:      Right side of head: No submental, submandibular, tonsillar, preauricular or posterior auricular adenopathy.      Left side of head: No submental, submandibular, tonsillar, preauricular or posterior auricular adenopathy.      Cervical:      Right cervical: No superficial or posterior cervical adenopathy.     Left cervical: No superficial or posterior cervical adenopathy.   Skin:     General: Skin is warm.      Capillary Refill: Capillary refill takes less than 2 seconds.   Neurological:      Mental Status: He is alert and oriented to person, place, and time.      Cranial Nerves: No cranial nerve deficit.      Sensory: No sensory deficit.      Motor: No abnormal muscle tone.      Coordination: Coordination normal.      Deep Tendon Reflexes: Reflexes normal.   Psychiatric:         Behavior: Behavior normal. Behavior is cooperative.         Thought Content: Thought content normal.         Judgment: Judgment normal.            Ryan Fatima PA-C  2/17/2024, 4:01 PM

## 2024-03-25 ENCOUNTER — MYC MEDICAL ADVICE (OUTPATIENT)
Dept: FAMILY MEDICINE | Facility: CLINIC | Age: 59
End: 2024-03-25
Payer: COMMERCIAL

## 2024-03-25 NOTE — TELEPHONE ENCOUNTER
Needs to schedule video visit with Dr. Upton - on maximum dose of amlodipine- cannot increase to 20 mg daily

## 2024-05-21 DIAGNOSIS — I10 HYPERTENSION GOAL BP (BLOOD PRESSURE) < 140/90: ICD-10-CM

## 2024-05-21 RX ORDER — AMLODIPINE BESYLATE 10 MG/1
10 TABLET ORAL DAILY
Qty: 90 TABLET | Refills: 1 | Status: SHIPPED | OUTPATIENT
Start: 2024-05-21

## 2024-07-05 ENCOUNTER — MYC REFILL (OUTPATIENT)
Dept: FAMILY MEDICINE | Facility: CLINIC | Age: 59
End: 2024-07-05
Payer: COMMERCIAL

## 2024-07-05 DIAGNOSIS — I10 HYPERTENSION GOAL BP (BLOOD PRESSURE) < 140/90: ICD-10-CM

## 2024-07-05 RX ORDER — AMLODIPINE BESYLATE 10 MG/1
10 TABLET ORAL DAILY
Qty: 90 TABLET | Refills: 1 | OUTPATIENT
Start: 2024-07-05

## 2024-11-12 ENCOUNTER — PATIENT OUTREACH (OUTPATIENT)
Dept: CARE COORDINATION | Facility: CLINIC | Age: 59
End: 2024-11-12
Payer: COMMERCIAL

## 2024-11-26 ENCOUNTER — PATIENT OUTREACH (OUTPATIENT)
Dept: CARE COORDINATION | Facility: CLINIC | Age: 59
End: 2024-11-26
Payer: COMMERCIAL

## 2024-12-06 ENCOUNTER — ANCILLARY PROCEDURE (OUTPATIENT)
Dept: GENERAL RADIOLOGY | Facility: CLINIC | Age: 59
End: 2024-12-06
Attending: PHYSICIAN ASSISTANT
Payer: COMMERCIAL

## 2024-12-06 DIAGNOSIS — R05.3 PERSISTENT COUGH FOR 3 WEEKS OR LONGER: ICD-10-CM

## 2024-12-06 PROCEDURE — 71046 X-RAY EXAM CHEST 2 VIEWS: CPT | Mod: TC | Performed by: RADIOLOGY

## 2024-12-30 ENCOUNTER — ANCILLARY PROCEDURE (OUTPATIENT)
Dept: GENERAL RADIOLOGY | Facility: CLINIC | Age: 59
End: 2024-12-30
Attending: PEDIATRICS
Payer: COMMERCIAL

## 2024-12-30 ENCOUNTER — OFFICE VISIT (OUTPATIENT)
Dept: ORTHOPEDICS | Facility: CLINIC | Age: 59
End: 2024-12-30
Payer: COMMERCIAL

## 2024-12-30 DIAGNOSIS — S59.901A ELBOW INJURY, RIGHT, INITIAL ENCOUNTER: Primary | ICD-10-CM

## 2024-12-30 DIAGNOSIS — S53.104A DISLOCATION OF RIGHT ELBOW, INITIAL ENCOUNTER: ICD-10-CM

## 2024-12-30 DIAGNOSIS — S59.901A ELBOW INJURY, RIGHT, INITIAL ENCOUNTER: ICD-10-CM

## 2024-12-30 PROCEDURE — 73080 X-RAY EXAM OF ELBOW: CPT | Mod: TC | Performed by: RADIOLOGY

## 2024-12-30 PROCEDURE — 99204 OFFICE O/P NEW MOD 45 MIN: CPT | Performed by: PEDIATRICS

## 2024-12-30 NOTE — PATIENT INSTRUCTIONS
Elbow dislocation that has been dislocated for multiple hours. Neurovascularly intact. Recommended patient be referred to the ED for needed reduction, likely with sedation/medications.    Plan:  - Today's Plan of Care:  Referred to the ED - will splint/wrap for comfort  Discussed no eating or drinking until ED    Follow Up: likely with orthopedics pending ED evaluation    If you have any further questions for your physician or physician s care team you can call [phone].

## 2024-12-30 NOTE — PROGRESS NOTES
ASSESSMENT & PLAN    Carlos A was seen today for elbow injury.    Diagnoses and all orders for this visit:    Elbow injury, right, initial encounter  -     XR Elbow RT G/E 3 vw; Future    Dislocation of right elbow, initial encounter      This issue is acute and Unchanged.        ICD-10-CM    1. Elbow injury, right, initial encounter  S59.901A XR Elbow RT G/E 3 vw      2. Dislocation of right elbow, initial encounter  S53.104A           Elbow dislocation that has been dislocated for multiple hours. Neurovascularly intact. Recommended patient be referred to the ED for needed reduction, likely with sedation/medications.  - We offered to call EMS for transport given the extent of the injury, patient declined.    Plan:  - Today's Plan of Care:  Referred to the ED - will splint/wrap for comfort  Discussed no eating or drinking until ED    Follow Up: likely with orthopedics pending ED evaluation    Concerning signs and symptoms were reviewed and all questions were answered at this time.    Debbie Richmond MD [facility]  Sports Medicine Physician  [facility]    -----  Chief Complaint   Patient presents with    Left Elbow - Elbow Injury       SUBJECTIVE  Carlos A Crespo is a/an 59 year old male who is seen as a WALK IN patient for evaluation of left elbow.     The patient is seen by themselves.  The patient is right handed    Onset: 2-4 hour(s) ago. Patient describes injury as getting into his car, was opening the door and slipped on the ice. He tried to catch his fall with his arm outstretched behind him. He reports that he felt a pop at the time of injury, followed by immediate pain. He has been unable to move his arm at all due to pain. Went to  initially was sent to our clinic.    Location of Pain: right elbow joint  Worsened by: movement of right arm  Better with: nothing  Treatments tried: no treatment tried to date  Associated symptoms: swelling, numbness, and tingling    Orthopedic/Surgical history:  NO  Social History/Occupation: works at a [employer] - can be heavy lifting    REVIEW OF SYSTEMS:  Review of Systems    OBJECTIVE:  There were no vitals taken for this visit.   General: healthy, alert and in no distress  Skin: no suspicious lesions or rash.  CV: distal perfusion intact   Resp: normal respiratory effort without conversational dyspnea   Psych: normal mood and affect  Gait: NORMAL  Neuro: Normal light sensory exam of upper extremity    Right Elbow exam:    Inspection:     no ecchymosis       edema noted right elbow with noticeable deformity    Tender:     globally throughout elbow    Non-Tender:      remainder of the elbow bilaterally    ROM:      Decreased ROM right elbow    Strength:     unable to test    Sensation:     intact throughout hand       intact throughout forearm   - neurovascularly intact    RADIOLOGY:  Final results and radiologist's interpretation, available in the [facility] health record.  Images were reviewed with the patient in the office today.  My personal interpretation of the performed imagin XR views of left elbow reviewed: posterior elbow dislocation with larger displaced likely cornoid fracture, no significant degenerative change  - will follow official read    Review of the result(s) of each unique test - XR

## 2024-12-30 NOTE — Clinical Note
For joint dislocations or displaced fractures please refer patients to the ER. Our walk in clinic does not have the clinic capacity or medications on site to help with reductions. Let me know if questions - Debbie Ward.

## 2024-12-30 NOTE — LETTER
12/30/2024      Carlos A Crespo  [address] [facility] [address]      Dear Colleague,    Thank you for referring your patient, Carlos A Crespo, to the [facility]. Please see a copy of my visit note below.    ASSESSMENT & PLAN    Carlos A was seen today for elbow injury.    Diagnoses and all orders for this visit:    Elbow injury, right, initial encounter  -     XR Elbow RT G/E 3 vw; Future    Dislocation of right elbow, initial encounter      This issue is acute and Unchanged.        ICD-10-CM    1. Elbow injury, right, initial encounter  S59.901A XR Elbow RT G/E 3 vw      2. Dislocation of right elbow, initial encounter  S53.104A           Elbow dislocation that has been dislocated for multiple hours. Neurovascularly intact. Recommended patient be referred to the ED for needed reduction, likely with sedation/medications.  - We offered to call EMS for transport given the extent of the injury, patient declined.    Plan:  - Today's Plan of Care:  Referred to the ED - will splint/wrap for comfort  Discussed no eating or drinking until ED    Follow Up: likely with orthopedics pending ED evaluation    Concerning signs and symptoms were reviewed and all questions were answered at this time.    Debbie Richmond MD [facility]  Sports Medicine Physician  [facility]    -----  Chief Complaint   Patient presents with     Left Elbow - Elbow Injury       SUBJECTIVE  Carlos A Crespo is a/an 59 year old male who is seen as a WALK IN patient for evaluation of left elbow.     The patient is seen by themselves.  The patient is right handed    Onset: 2-4 hour(s) ago. Patient describes injury as getting into his car, was opening the door and slipped on the ice. He tried to catch his fall with his arm outstretched behind him. He reports that he felt a pop at the time of injury, followed by immediate pain. He has been unable to move his arm at all due to pain. Went to   initially was sent to our clinic.    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   General: healthy, alert and in no distress  Skin: no suspicious lesions or rash.  CV: distal perfusion intact   Resp: normal respiratory effort without conversational dyspnea   Psych: normal mood and affect  Gait: NORMAL  Neuro: Normal light sensory exam of upper extremity    Right Elbow exam:    Inspection:     no ecchymosis       edema noted right elbow with noticeable deformity    Tender:     globally throughout elbow    Non-Tender:      remainder of the elbow bilaterally    ROM:      Decreased ROM right elbow    Strength:     unable to test    Sensation:     intact throughout hand       intact throughout forearm   - neurovascularly intact    RADIOLOGY:  Final results and radiologist's interpretation, available in the [facility] health record.  Images were reviewed with the patient in the office today.  My personal interpretation of the performed imagin XR views of left elbow reviewed: posterior elbow dislocation with larger displaced likely cornoid fracture, no significant degenerative change  - will follow official read    Review of the result(s) of each unique test - XR             Again, thank you for allowing me to participate in the care of your patient.        Sincerely,        Debbie Richmond MD    Electronically signed

## 2025-01-02 NOTE — PROGRESS NOTES
Carlos A Crespo  :  1965  DOS: 1/3/2025  MRN: [id number]  PCP: Frances Upton    Sports Medicine Clinic Visit      HPI  Carlos A Crespo is a 59 year old male who is seen as a self referral presenting with a right elbow injury    - Mechanism of Injury:    -  2024: slipped/fell while entering/exiting his truck. FOOSH mechanism with the arm behind him and impacted in a supinated position.  - Pertinent history and prior evaluations:    -  ED visit 2024 after fall onto right elbow. Obvious deformity. Posterior dislocation was confirmed by radiographs and was subsequently reduced and splinted in the ED.  Advised to follow up with orthopedics afterward. Postreduction Xray shows successful reduction of fracture with bone fragments off anterior aspect of elbow, most likely coronoid process of ulna, displaced. Given Percoset and ibuprofen for pain.    - Pain Character:    - Location:  Throughout the elbow, antecubital fossa, posterior olecranon  - Character:  Dull ache with sharp pain during elbow movements  - Duration: 4 days  - Course: Pain improving, but has been in a splint  - Endorses:    - swelling of the arm and hand, bruising, visible deformity right after the injury.  Feels like he is doing quite well since the pain is getting better.  He had a similar injury in  to the left elbow with a coronoid process fracture but no dislocation at that time.  Treated conservatively with splinting.  Left elbow is doing very well with full function.  - Denies:    - grinding, mechanical locking symptoms, numbness, tingling, radicular shooting pain, handgrip weakness.  Does feel some clunking in the posterolateral elbow with extension and supination.  - Alleviating factors:    - rest, ibuprofen, splint  - Aggravating factors:    - overhead activities, handgrip activities while in the splint  - Other treatments tried:    - rest, bracing (in brace and sling from ED)    - Patient Goals:    - be able to  manage the symptoms successfully, discuss treatment options  - Social History:   - Retired, former .       Review of Systems  Musculoskeletal: as above  Remainder of review of systems is negative including constitutional, CV, pulmonary, GI, Skin and Neurologic except as noted in HPI or medical history.    Past Medical History:   Diagnosis Date    Allergic rhinitis, cause unspecified     Arthritis     Cancer (H)     Carpal tunnel syndrome     RIght wrist off and on.    COPD (chronic obstructive pulmonary disease) (H)     Diabetes (H)     Hypertension     Infection due to 2019 novel coronavirus 04/17/2022    Low back pain     Scoliosis of thoracic spine      Past Surgical History:   Procedure Laterality Date    NO HISTORY OF SURGERY       Family History   Problem Relation Age of Onset    Hypertension Mother     Breast Cancer Mother     Diabetes Father     Hypertension Father     Pulmonary Embolism Father     Diabetes Maternal Uncle     Cerebrovascular Disease No family hx of     Coronary Artery Disease No family hx of          Objective  There were no vitals taken for this visit.    General: healthy, alert and in no acute distress.    HEENT: no scleral icterus or conjunctival erythema.   Skin: no suspicious lesions or rash. No jaundice.   CV: regular rhythm by palpation, 2+ distal pulses.  Resp: normal respiratory effort without conversational dyspnea.   Psych: normal mood and affect.    Gait: nonantalgic, appropriate coordination and balance.     Neuro:        - Sensation to light touch:    - Intact throughout the BUE including all peripheral nerve distributions.        - Special tests:   - Spurling's:  Neg    - Tinel's at the wrist:  Neg    - Tinel's at the elbow:  Neg     MSK - Elbow:       - Inspection:    - He does have significant swelling, bruising about the right elbow.  No gross deformity at this time.  No open lesions/wounds.       - ROM:    - Limited in elbow flexion and extension by pain at  the lateral elbow/radial head, antecubital fossa, posterior olecranon       - Palpation:    - TTP at the antecubital fossa, radial head, posterior olecranon.   - NTTP elsewhere.        - Strength:   (*antalgic, modified for coronoid fracture)  - Shoulder Abduction   5     - Shoulder Internal Rotation  5    - Shoulder External Rotation  5   - Elbow Flexion   4*   - Elbow Extension   4-*   - Forearm Pronation   5-*   - Forearm Supination   4+*   - Wrist Extension   5-*   - Wrist Flexion    5-*   - FDI     5   - ADM     5   - FPL     5   - APB     5   - EIP     5   - EDC     5   - APL/EPB    5          - Special tests:  - Valgus stress:  Neg    - Varus stress:  Neg    - PLRI test:  Pos       Radiology  I independently reviewed the available relevant imaging in the chart with my interpretations as above in HPI.   - Prereduction films on 12/30/2024 show a posterior elbow dislocation with at least a type II coronoid process fracture and elbow joint effusion.    I independently reviewed today's new relevant imaging, with the following interpretation:  - XR R elbow 1/3/2025 show a relatively reduced coronoid process fracture with satisfactory alignment postreduction at the elbow joint.  Small elbow joint effusion still present.  No other acute fractures appreciated.      Assessment  1. Dislocation of right elbow, initial encounter        Plan  Carlos A Crespo is a pleasant 59 year old male that presents as an orthopedic new patient for ED follow-up after a right posterior elbow joint dislocation.  He endorses that he slipped/fell from his truck on 12/30/2024, FOOSH mechanism injury with the arm extended behind him.  He felt sharp pain at the elbow and noticed an obvious deformity and could not move the elbow after the injury.  He presented to multiple clinics and was advised to go to the emergency room for a reduction of the elbow when it was identified as a posterior dislocation by radiographs.  He did present to the ED  and there was a successful reduction of the dislocation with quality postreduction films.  He was splinted afterward with the elbow at approximately 90 degrees with slight pronation of the forearm.    Since the ED, he has been doing well with pain improving somewhat in the interim.  He has been compliant with his splint and only has a complaint of itchiness, otherwise no acute concerns.  On exam, he does have posterolateral rotatory instability at the elbow with testing, mild laxity and pain with valgus testing, and cannot achieve full elbow extension without severe pain at the antecubital fossa.  Repeat radiographs today show improvement in alignment of the coronoid process fracture, however appears to be likely type II by the classification.    He does have a significant history in 2021 of a coronoid process fracture on the left that healed well with splinting and he currently has full function of the elbow/arm.  There was no dislocation event on the left in 2021.    Due to the clear dislocation event this time on the right, reduction in the ED, likely type II coronoid process fracture, and continued instability of the elbow, I recommended that he see my colleague Dr. Marin, elbow surgeon, for consideration of surgical intervention if necessary.  We added some padding to his splint today and reapplied before he was discharged, and orthopedic  referral placed for Dr. Marin.  Staff will reach out to help schedule an appointment in the coming days.  Advanced imaging could be ordered preclinic if Dr. Marin would prefer.  He is doing okay with pain and will continue with his previously prescribed medications.  Questions were answered and he was stable on discharge.      Ravinder Chinchilla DO, CAQSM  [facility]  [facility] - Department of Orthopedic Surgery       Disclaimer:  This note was prepared and written using Dragon Medical dictation software. As a result, there may be  errors in the script that have gone undetected. Please consider this when interpreting the information in this note.

## 2025-01-03 ENCOUNTER — ANCILLARY PROCEDURE (OUTPATIENT)
Dept: GENERAL RADIOLOGY | Facility: CLINIC | Age: 60
End: 2025-01-03
Attending: STUDENT IN AN ORGANIZED HEALTH CARE EDUCATION/TRAINING PROGRAM
Payer: COMMERCIAL

## 2025-01-03 ENCOUNTER — OFFICE VISIT (OUTPATIENT)
Dept: ORTHOPEDICS | Facility: CLINIC | Age: 60
End: 2025-01-03
Payer: COMMERCIAL

## 2025-01-03 DIAGNOSIS — S59.901A ELBOW INJURY, RIGHT, INITIAL ENCOUNTER: ICD-10-CM

## 2025-01-03 DIAGNOSIS — S53.104A DISLOCATION OF RIGHT ELBOW, INITIAL ENCOUNTER: Primary | ICD-10-CM

## 2025-01-03 PROCEDURE — 73080 X-RAY EXAM OF ELBOW: CPT | Mod: RT | Performed by: STUDENT IN AN ORGANIZED HEALTH CARE EDUCATION/TRAINING PROGRAM

## 2025-01-03 PROCEDURE — 99214 OFFICE O/P EST MOD 30 MIN: CPT | Performed by: STUDENT IN AN ORGANIZED HEALTH CARE EDUCATION/TRAINING PROGRAM

## 2025-01-03 NOTE — PROGRESS NOTES
Orthopaedic Surgery Hand and Upper Extremity Clinic H&P Note:  Date: Jan 7, 2025     Patient Name: Carlos A Crespo  MRN: [id number]    Consult requested by: Dr. Renée DO    CHIEF COMPLAINT: closed fracture dislocation of right elbow    Dominant Hand: right  Occupation:        HPI:  Mr. Carlos A Crespo is a 59 year old male right hand dominant who presents with right elbow injury. Injury occurred on 12/30/24 when patient slipped backwards on ice and fell on elbow, sustaining right elbow dislocation. He was seen at the ED on day of injury where closed reduction was performed.  Patient saw  Dr. Chinchilla on 1/3/25. He presents in long arm posterior splint today and sling. He was has intermittent pain    He notes he was prescribed ibuprofen 600mg, but this was causing shortness of breath so he has been taking half a dose. He was also prescribed percocent but has not needed to take this.       PMH  Diabetes: no  Thyroid Problems: no  Smoking: no      PAST MEDICAL HISTORY:  Past Medical History:   Diagnosis Date    Allergic rhinitis, cause unspecified     Arthritis     Cancer (H)     Carpal tunnel syndrome     RIght wrist off and on.    COPD (chronic obstructive pulmonary disease) (H)     Diabetes (H)     Hypertension     Infection due to 2019 novel coronavirus 04/17/2022    Low back pain     Scoliosis of thoracic spine        PAST SURGICAL HISTORY:  Past Surgical History:   Procedure Laterality Date    NO HISTORY OF SURGERY         MEDICATIONS:  Current Outpatient Medications   Medication Sig Dispense Refill    albuterol (PROAIR HFA/PROVENTIL HFA/VENTOLIN HFA) 108 (90 Base) MCG/ACT inhaler Inhale 2 puffs into the lungs every 6 hours as needed for shortness of breath, wheezing or cough 18 g 2    amLODIPine (NORVASC) 10 MG tablet TAKE 1 TABLET(10 MG) BY MOUTH DAILY 90 tablet 1    benzonatate (TESSALON) 200 MG capsule Take 1 capsule (200 mg) by mouth 3 times daily as needed for cough. 40 capsule 0     cetirizine (ZYRTEC) 10 MG tablet Take 10 mg by mouth daily       No current facility-administered medications for this visit.       ALLERGIES:     Allergies   Allergen Reactions    Influenza Vac Split [Influenza Virus Vaccine]     Tetracycline     Ace Inhibitors Cough     Unsure as to which medication was used but probably Lisinopril. Does remember having a cough with one of the medications in the past.       FAMILY HISTORY:  No pertinent family history    SOCIAL HISTORY:  Social History     Tobacco Use    Smoking status: Never     Passive exposure: Never    Smokeless tobacco: Never   Vaping Use    Vaping status: Never Used   Substance Use Topics    Alcohol use: Yes     Comment: occassional - 1 to 2 times a year    Drug use: No       The patient's past medical, family, and social history was reviewed and confirmed.    REVIEW OF SYMPTOMS:      General: Negative   Eyes: Negative   Ear, Nose and Throat: Negative   Respiratory: Negative   Cardiovascular: Negative   Gastrointestinal: Negative   Genito-urinary: Negative   Musculoskeletal: see HPI  Neurological: Negative   Psychological: Negative  HEME: Negative   ENDO: Negative   SKIN: Negative    VITALS:  There were no vitals filed for this visit.    EXAM:  General: NAD, A&Ox3  HEENT: NC/AT  CV: RRR by peripheral pulse  Pulmonary: Non-labored breathing on RA  RUE:  Mild swelling with palpable effusion of the right elbow  Range of motion  without significant discomfort  Near full pronation and supination  Elbow stable in full supination and extension  No radial head dislocation  Intact EPL, FPL, intrinsics  Sensation intact to light touch all distributions  Warm well-perfused, capillary refill less than 2 seconds       IMAGING:    X-rays of the right elbow 12/30/2024 compared to those 1/3/2025.  These demonstrate a posterior elbow dislocation status post closed reduction with coronoid tip fracture.  This fracture fragment is irregular suggesting sequela of prior  elbow trauma or degenerative change.    X-rays of the right elbow today demonstrates concentric elbow joint without subluxation in supination and extension.  Possible mild posterior sag of the radial head    I have personally reviewed the above images and labs.         IMPRESSION AND RECOMMENDATIONS:  Mr. Carlos A Crespo is a 59 year old male right hand dominant with right closed elbow dislocation and associated coronoid tip fracture.    Elbow appears stable on exam and radiographic evaluation in supination and extension.  I therefore have recommended trial of nonoperative treatment.    Splint was removed.  Recommended hand therapy for range of motion and strengthening.  Avoid axial load and supination and extension such as pushing off from a chair.  Wean sling over the next 1 to 2 weeks.    Patient is okay to return to work next week with 1 pound weightbearing restriction of the right arm.  Follow-up with me 6 weeks after injury for repeat evaluation. 3V x-rays of the right elbow needed at that time.    All questions answered.  The patient voiced understanding and agreement.    Sterling Marin MD    Hand, Upper Extremity & Microvascular Surgery  Department of Orthopaedic Surgery  [facility]

## 2025-01-03 NOTE — LETTER
1/3/2025      Carlos A Crespo  [address] [facility] [address]      Dear Colleague,    Thank you for referring your patient, Carlos A Crespo, to the [facility] [place]. Please see a copy of my visit note below.    Carlos A Crespo  :  1965  DOS: 1/3/2025  MRN: [id number]  PCP: Frances Upton    Sports Medicine Clinic Visit      HPI  Carlos A Crespo is a 59 year old male who is seen as a self referral presenting with a right elbow injury    - Mechanism of Injury:    -  2024: slipped/fell while entering/exiting his truck. FOOSH mechanism with the arm behind him and impacted in a supinated position.  - Pertinent history and prior evaluations:    -  ED visit 2024 after fall onto right elbow. Obvious deformity. Posterior dislocation was confirmed by radiographs and was subsequently reduced and splinted in the ED.  Advised to follow up with orthopedics afterward. Postreduction Xray shows successful reduction of fracture with bone fragments off anterior aspect of elbow, most likely coronoid process of ulna, displaced. Given Percoset and ibuprofen for pain.    - Pain Character:    - Location:  Throughout the elbow, antecubital fossa, posterior olecranon  - Character:  Dull ache with sharp pain during elbow movements  - Duration: 4 days  - Course: Pain improving, but has been in a splint  - Endorses:    - swelling of the arm and hand, bruising, visible deformity right after the injury.  Feels like he is doing quite well since the pain is getting better.  He had a similar injury in  to the left elbow with a coronoid process fracture but no dislocation at that time.  Treated conservatively with splinting.  Left elbow is doing very well with full function.  - Denies:    - grinding, mechanical locking symptoms, numbness, tingling, radicular shooting pain, handgrip weakness.  Does feel some clunking in the posterolateral elbow with extension  and supination.  - Alleviating factors:    - rest, ibuprofen, splint  - Aggravating factors:    - overhead activities, handgrip activities while in the splint  - Other treatments tried:    - rest, bracing (in brace and sling from ED)    - Patient Goals:    - be able to manage the symptoms successfully, discuss treatment options  - Social History:   - Retired, former .       Review of Systems  Musculoskeletal: as above  Remainder of review of systems is negative including constitutional, CV, pulmonary, GI, Skin and Neurologic except as noted in HPI or medical history.    Past Medical History:   Diagnosis Date     Allergic rhinitis, cause unspecified      Arthritis      Cancer (H)      Carpal tunnel syndrome     RIght wrist off and on.     COPD (chronic obstructive pulmonary disease) (H)      Diabetes (H)      Hypertension      Infection due to 2019 novel coronavirus 04/17/2022     Low back pain      Scoliosis of thoracic spine      Past Surgical History:   Procedure Laterality Date     NO HISTORY OF SURGERY       Family History   Problem Relation Age of Onset     Hypertension Mother      Breast Cancer Mother      Diabetes Father      Hypertension Father      Pulmonary Embolism Father      Diabetes Maternal Uncle      Cerebrovascular Disease No family hx of      Coronary Artery Disease No family hx of          Objective  There were no vitals taken for this visit.    General: healthy, alert and in no acute distress.    HEENT: no scleral icterus or conjunctival erythema.   Skin: no suspicious lesions or rash. No jaundice.   CV: regular rhythm by palpation, 2+ distal pulses.  Resp: normal respiratory effort without conversational dyspnea.   Psych: normal mood and affect.    Gait: nonantalgic, appropriate coordination and balance.     Neuro:        - Sensation to light touch:    - Intact throughout the BUE including all peripheral nerve distributions.        - Special tests:   - Spurling's:  Neg    -  Tinel's at the wrist:  Neg    - Tinel's at the elbow:  Neg     MSK - Elbow:       - Inspection:    - He does have significant swelling, bruising about the right elbow.  No gross deformity at this time.  No open lesions/wounds.       - ROM:    - Limited in elbow flexion and extension by pain at the lateral elbow/radial head, antecubital fossa, posterior olecranon       - Palpation:    - TTP at the antecubital fossa, radial head, posterior olecranon.   - NTTP elsewhere.        - Strength:   (*antalgic, modified for coronoid fracture)  - Shoulder Abduction   5     - Shoulder Internal Rotation  5    - Shoulder External Rotation  5   - Elbow Flexion   4*   - Elbow Extension   4-*   - Forearm Pronation   5-*   - Forearm Supination   4+*   - Wrist Extension   5-*   - Wrist Flexion    5-*   - FDI     5   - ADM     5   - FPL     5   - APB     5   - EIP     5   - EDC     5   - APL/EPB    5          - Special tests:  - Valgus stress:  Neg    - Varus stress:  Neg    - PLRI test:  Pos       Radiology  I independently reviewed the available relevant imaging in the chart with my interpretations as above in HPI.   - Prereduction films on 12/30/2024 show a posterior elbow dislocation with at least a type II coronoid process fracture and elbow joint effusion.    I independently reviewed today's new relevant imaging, with the following interpretation:  - XR R elbow 1/3/2025 show a relatively reduced coronoid process fracture with satisfactory alignment postreduction at the elbow joint.  Small elbow joint effusion still present.  No other acute fractures appreciated.      Assessment  1. Dislocation of right elbow, initial encounter        Plan  Carlos A Crespo is a pleasant 59 year old male that presents as an orthopedic new patient for ED follow-up after a right posterior elbow joint dislocation.  He endorses that he slipped/fell from his truck on 12/30/2024, FOOSH mechanism injury with the arm extended behind him.  He felt sharp  pain at the elbow and noticed an obvious deformity and could not move the elbow after the injury.  He presented to multiple clinics and was advised to go to the emergency room for a reduction of the elbow when it was identified as a posterior dislocation by radiographs.  He did present to the ED and there was a successful reduction of the dislocation with quality postreduction films.  He was splinted afterward with the elbow at approximately 90 degrees with slight pronation of the forearm.    Since the ED, he has been doing well with pain improving somewhat in the interim.  He has been compliant with his splint and only has a complaint of itchiness, otherwise no acute concerns.  On exam, he does have posterolateral rotatory instability at the elbow with testing, mild laxity and pain with valgus testing, and cannot achieve full elbow extension without severe pain at the antecubital fossa.  Repeat radiographs today show improvement in alignment of the coronoid process fracture, however appears to be likely type II by the classification.    He does have a significant history in 2021 of a coronoid process fracture on the left that healed well with splinting and he currently has full function of the elbow/arm.  There was no dislocation event on the left in 2021.    Due to the clear dislocation event this time on the right, reduction in the ED, likely type II coronoid process fracture, and continued instability of the elbow, I recommended that he see my colleague Dr. Marin, elbow surgeon, for consideration of surgical intervention if necessary.  We added some padding to his splint today and reapplied before he was discharged, and orthopedic  referral placed for Dr. Marin.  Staff will reach out to help schedule an appointment in the coming days.  Advanced imaging could be ordered preclinic if Dr. Marin would prefer.  He is doing okay with pain and will continue with his previously prescribed medications.  Questions were  answered and he was stable on discharge.      Ravinder Chinchilla DO, CAQSM  [facility] - Department of Orthopedic Surgery       Disclaimer:  This note was prepared and written using Dragon Medical dictation software. As a result, there may be errors in the script that have gone undetected. Please consider this when interpreting the information in this note.          Again, thank you for allowing me to participate in the care of your patient.        Sincerely,      Ravinder Chinchilla DO    Electronically signed

## 2025-01-03 NOTE — LETTER
January 3, 2025      Carlos A Crespo  [address] [facility] [id number]        To Whom It May Concern:    Carlos A Crespo was seen on 1/3/25 for a dislocated right elbow that will require rest, rehabilitation and possible surgery to treat. At this time, it is unsafe to perform his regular work duties.  Please excuse him from work duties until cleared by orthopedic surgery to return to work safely due to this injury.          Sincerely,  Ravinder Chinchilla DO, CAQSM [facility] - Sports Medicine  [facility] - Department of Orthopedic Surgery

## 2025-01-07 ENCOUNTER — TELEPHONE (OUTPATIENT)
Dept: NURSING | Facility: CLINIC | Age: 60
End: 2025-01-07

## 2025-01-07 ENCOUNTER — ANCILLARY PROCEDURE (OUTPATIENT)
Dept: GENERAL RADIOLOGY | Facility: CLINIC | Age: 60
End: 2025-01-07
Attending: STUDENT IN AN ORGANIZED HEALTH CARE EDUCATION/TRAINING PROGRAM
Payer: COMMERCIAL

## 2025-01-07 ENCOUNTER — OFFICE VISIT (OUTPATIENT)
Dept: ORTHOPEDICS | Facility: CLINIC | Age: 60
End: 2025-01-07
Payer: COMMERCIAL

## 2025-01-07 DIAGNOSIS — S53.104A DISLOCATION OF RIGHT ELBOW, INITIAL ENCOUNTER: ICD-10-CM

## 2025-01-07 PROCEDURE — 73070 X-RAY EXAM OF ELBOW: CPT | Mod: TC | Performed by: STUDENT IN AN ORGANIZED HEALTH CARE EDUCATION/TRAINING PROGRAM

## 2025-01-07 PROCEDURE — 99214 OFFICE O/P EST MOD 30 MIN: CPT | Performed by: STUDENT IN AN ORGANIZED HEALTH CARE EDUCATION/TRAINING PROGRAM

## 2025-01-07 NOTE — LETTER
1/7/2025      Carlos A Crespo  [address] [facility] [address]      Dear Colleague,    Thank you for referring your patient, Carlos A Crespo, to the [facility] [place]. Please see a copy of my visit note below.    Orthopaedic Surgery Hand and Upper Extremity Clinic H&P Note:  Date: Jan 7, 2025     Patient Name: Carlos A Crespo  MRN: [id number]    Consult requested by: Dr. Renée DO    CHIEF COMPLAINT: closed fracture dislocation of right elbow    Dominant Hand: right  Occupation:        HPI:  Mr. Carlos A Crespo is a 59 year old male right hand dominant who presents with right elbow injury. Injury occurred on 12/30/24 when patient slipped backwards on ice and fell on elbow, sustaining right elbow dislocation. He was seen at the ED on day of injury where closed reduction was performed.  Patient saw  Dr. Chinchilla on 1/3/25. He presents in long arm posterior splint today and sling. He was has intermittent pain    He notes he was prescribed ibuprofen 600mg, but this was causing shortness of breath so he has been taking half a dose. He was also prescribed percocent but has not needed to take this.       PMH  Diabetes: no  Thyroid Problems: no  Smoking: no      PAST MEDICAL HISTORY:  Past Medical History:   Diagnosis Date     Allergic rhinitis, cause unspecified      Arthritis      Cancer (H)      Carpal tunnel syndrome     RIght wrist off and on.     COPD (chronic obstructive pulmonary disease) (H)      Diabetes (H)      Hypertension      Infection due to 2019 novel coronavirus 04/17/2022     Low back pain      Scoliosis of thoracic spine        PAST SURGICAL HISTORY:  Past Surgical History:   Procedure Laterality Date     NO HISTORY OF SURGERY         MEDICATIONS:  Current Outpatient Medications   Medication Sig Dispense Refill     albuterol (PROAIR HFA/PROVENTIL HFA/VENTOLIN HFA) 108 (90 Base) MCG/ACT inhaler Inhale 2 puffs into the lungs every 6 hours  as needed for shortness of breath, wheezing or cough 18 g 2     amLODIPine (NORVASC) 10 MG tablet TAKE 1 TABLET(10 MG) BY MOUTH DAILY 90 tablet 1     benzonatate (TESSALON) 200 MG capsule Take 1 capsule (200 mg) by mouth 3 times daily as needed for cough. 40 capsule 0     cetirizine (ZYRTEC) 10 MG tablet Take 10 mg by mouth daily       No current facility-administered medications for this visit.       ALLERGIES:     Allergies   Allergen Reactions     Influenza Vac Split [Influenza Virus Vaccine]      Tetracycline      Ace Inhibitors Cough     Unsure as to which medication was used but probably Lisinopril. Does remember having a cough with one of the medications in the past.       FAMILY HISTORY:  No pertinent family history    SOCIAL HISTORY:  Social History     Tobacco Use     Smoking status: Never     Passive exposure: Never     Smokeless tobacco: Never   Vaping Use     Vaping status: Never Used   Substance Use Topics     Alcohol use: Yes     Comment: occassional - 1 to 2 times a year     Drug use: No       The patient's past medical, family, and social history was reviewed and confirmed.    REVIEW OF SYMPTOMS:      General: Negative   Eyes: Negative   Ear, Nose and Throat: Negative   Respiratory: Negative   Cardiovascular: Negative   Gastrointestinal: Negative   Genito-urinary: Negative   Musculoskeletal: see HPI  Neurological: Negative   Psychological: Negative  HEME: Negative   ENDO: Negative   SKIN: Negative    VITALS:  There were no vitals filed for this visit.    EXAM:  General: NAD, A&Ox3  HEENT: NC/AT  CV: RRR by peripheral pulse  Pulmonary: Non-labored breathing on RA  RUE:  Mild swelling with palpable effusion of the right elbow  Range of motion  without significant discomfort  Near full pronation and supination  Elbow stable in full supination and extension  No radial head dislocation  Intact EPL, FPL, intrinsics  Sensation intact to light touch all distributions  Warm well-perfused, capillary  refill less than 2 seconds       IMAGING:    X-rays of the right elbow 12/30/2024 compared to those 1/3/2025.  These demonstrate a posterior elbow dislocation status post closed reduction with coronoid tip fracture.  This fracture fragment is irregular suggesting sequela of prior elbow trauma or degenerative change.    X-rays of the right elbow today demonstrates concentric elbow joint without subluxation in supination and extension.  Possible mild posterior sag of the radial head    I have personally reviewed the above images and labs.         IMPRESSION AND RECOMMENDATIONS:  Mr. Carlos A Crespo is a 59 year old male right hand dominant with right closed elbow dislocation and associated coronoid tip fracture.    Elbow appears stable on exam and radiographic evaluation in supination and extension.  I therefore have recommended trial of nonoperative treatment.    Splint was removed.  Recommended hand therapy for range of motion and strengthening.  Avoid axial load and supination and extension such as pushing off from a chair.  Wean sling over the next 1 to 2 weeks.    Patient is okay to return to work next week with 1 pound weightbearing restriction of the right arm.  Follow-up with me 6 weeks after injury for repeat evaluation. 3V x-rays of the right elbow needed at that time.    All questions answered.  The patient voiced understanding and agreement.    Sterling Marin MD    Hand, Upper Extremity & Microvascular Surgery  Department of Orthopaedic Surgery  [facility]          Again, thank you for allowing me to participate in the care of your patient.        Sincerely,        Sterling Marin MD    Electronically signed

## 2025-01-08 ENCOUNTER — TELEPHONE (OUTPATIENT)
Dept: ORTHOPEDICS | Facility: CLINIC | Age: 60
End: 2025-01-08
Payer: COMMERCIAL

## 2025-01-08 NOTE — TELEPHONE ENCOUNTER
Phone call to patient and apologized for the confusion. Explained that Dr. Marin wants to see him 6 weeks from date of injury on 12/30/24. He understood and will call back to schedule when he checks his availability for 2/11/25.     CHIO Victoria RN

## 2025-01-08 NOTE — TELEPHONE ENCOUNTER
Patient calling. He dislocated his right elbow 8 days ago, and was seen in the ED. His elbow was splinted by orthopedics on Friday. Today, he was told that he didn't need the splint anymore. Tonight when he got home, he noticed tingling in his fingers, which had gone away when they took the splint off. He noticed tonight that there's a large bruise on the right side of his forearm, and the tingling came back, but mild.     He is calling to ask if the bruising is normal, and is in agreement when it was mentioned that the bruising may have occurred when he was injured, and that he wouldn't be able to see the bruising through the splint.     He states that he has full sensation below the injury to both light and firm touch.     Routed to Dr. Marin and ALFONSO regarding sensation changes.     No triage.     Hellen Nuñez RN  [facility]  January 7, 2025, 8:43 PM

## 2025-01-08 NOTE — TELEPHONE ENCOUNTER
Reason for call:  Patient returned call about follow up appointment. He stated he was told by Dr. Marin to come back in 6 weeks and 2/11 is not 6 weeks. He requests return call from Dr. Marin.    Cell number on file:    Telephone Information:   Mobile [phone]

## 2025-01-08 NOTE — TELEPHONE ENCOUNTER
Received the following staff message:    Sterling Marin MD  P Fsoc  Ortho Surgery  Hi    This patient did not schedule a follow-up visit with me.    He should come see me on 2/11 for repeat check and x-rays    Thanks

## 2025-01-10 ENCOUNTER — OFFICE VISIT (OUTPATIENT)
Dept: ORTHOPEDICS | Facility: CLINIC | Age: 60
End: 2025-01-10
Payer: COMMERCIAL

## 2025-01-10 ENCOUNTER — ANCILLARY PROCEDURE (OUTPATIENT)
Dept: GENERAL RADIOLOGY | Facility: CLINIC | Age: 60
End: 2025-01-10
Attending: FAMILY MEDICINE
Payer: COMMERCIAL

## 2025-01-10 DIAGNOSIS — S59.901A ELBOW INJURY, RIGHT, INITIAL ENCOUNTER: ICD-10-CM

## 2025-01-10 DIAGNOSIS — S53.104A DISLOCATION OF RIGHT ELBOW, INITIAL ENCOUNTER: ICD-10-CM

## 2025-01-10 DIAGNOSIS — S59.901D ELBOW INJURY, RIGHT, SUBSEQUENT ENCOUNTER: ICD-10-CM

## 2025-01-10 DIAGNOSIS — S53.104D DISLOCATION OF RIGHT ELBOW, SUBSEQUENT ENCOUNTER: Primary | ICD-10-CM

## 2025-01-10 PROCEDURE — 99213 OFFICE O/P EST LOW 20 MIN: CPT | Performed by: FAMILY MEDICINE

## 2025-01-10 PROCEDURE — 73080 X-RAY EXAM OF ELBOW: CPT | Mod: TC | Performed by: RADIOLOGY

## 2025-01-10 RX ORDER — OXYCODONE AND ACETAMINOPHEN 5; 325 MG/1; MG/1
1-2 TABLET ORAL EVERY 6 HOURS PRN
COMMUNITY

## 2025-01-10 RX ORDER — IBUPROFEN 600 MG/1
600 TABLET, FILM COATED ORAL
COMMUNITY
Start: 2024-12-30

## 2025-01-10 NOTE — Clinical Note
Hi Dr Marin (and Dr Chinchilla), He looked to be doing just fine clinically when I saw him on Friday relative to any concern about dislocation.  Let me know if you have any questions!  Best,   Anant Macdonald DO, CAMEREDITH Sports Medicine Physician [facility] Orthopedics and Sports Medicine

## 2025-01-10 NOTE — LETTER
1/10/2025      Carlos A Crespo  [address] [facility] [address]      Dear Colleague,    Thank you for referring your patient, Carlos A Crespo, to the [facility] [place]. Please see a copy of my visit note below.    Carlos A Crespo  :  1965  DOS: 1/10/2025  MRN: [id number]    Sports Medicine Clinic Visit    PCP: Frances Upton    Carlos A Crespo is a 59 year old Right hand dominant male who is seen in consultation at the request of  Sterling Marin M.D., in follow-up, as a WALK IN patient presenting with right elbow dislocation.    Carlos A Crespo is now 11 days out from right elbow dislocation.  Since last visit on 2025 with Dr Marin patient notes increased swelling & deep aching pain after discontinuing the posterior splint.  Patient does note some bruising down his right forearm although not tender over this area.  Patient is able to supination/pronate right forearm & elbow flex to 90 degrees with some discomfort.  No obvious escobar deformity today.  Presents for repeat imaging & assessment at request of Dr Marin.       Review of Systems  Musculoskeletal: as above  Remainder of review of systems is negative including constitutional, CV, pulmonary, GI, Skin and Neurologic except as noted in HPI or medical history.    Past Medical History:   Diagnosis Date     Allergic rhinitis, cause unspecified      Arthritis      Cancer (H)      Carpal tunnel syndrome     RIght wrist off and on.     COPD (chronic obstructive pulmonary disease) (H)      Diabetes (H)      Hypertension      Infection due to 2019 novel coronavirus 2022     Low back pain      Scoliosis of thoracic spine      Past Surgical History:   Procedure Laterality Date     NO HISTORY OF SURGERY       Family History   Problem Relation Age of Onset     Hypertension Mother      Breast Cancer Mother      Diabetes Father      Hypertension Father      Pulmonary Embolism Father      Diabetes Maternal  Uncle      Cerebrovascular Disease No family hx of      Coronary Artery Disease No family hx of        Objective  There were no vitals taken for this visit.    General: healthy, alert and in no distress    HEENT: no scleral icterus or conjunctival erythema   Skin: no suspicious lesions or rash. No jaundice.   CV: regular rhythm by palpation, 2+ distal pulses, no pedal edema    Resp: normal respiratory effort without conversational dyspnea   Psych: normal mood and affect    Gait: nonantalgic, appropriate coordination and balance   Neuro: normal light touch sensory exam of the extremities. Motor strength as noted below     Right Wrist and Hand exam    Inspection:       Ecchymosis over the volar wrist    Tender:       No significant TTP    Non Tender:       distal radius right, RC joint,     anatomic snuffbox right,      scapholunate interval right, and      TFCC right    ROM:       Full wrist ROM without pain at the wrist, mild discomfort at the elbow    Strength:       Motor function intact    Neurovascular:       2+ radial pulses bilaterally with brisk capillary refill and      normal sensation to light touch in the radial, median and ulnar nerve distributions    Right Elbow exam:    Inspection:       edema noted moderate about the elbow, scattered ecchymosis    ROM:       Limited terminal ROM with flexion and extension but motor function intact, only mildly limited ROM without pain in pronation and supination    Strength:       Motor function intact as noted    Tender:       Generally about the elbow, no new focal TTP per patient    Sensation:       intact throughout hand       intact throughout forearm     Skin:       well perfused       capillary refill less than 2 seconds        Radiology:  Recent Results (from the past 744 hours)   XR Elbow RT G/E 3 vw    Narrative    ELBOW THREE VIEWS RIGHT  12/30/2024 3:51 PM     HISTORY: Elbow injury, right, initial encounter; pain  COMPARISON: None.      Impression     IMPRESSION: Posterior dislocation of the radius and ulna relative to  the humerus. There is a fracture fragment anteriorly, which may  originate from the coronoid process. There appears to be an elbow  joint effusion.    EUSEBIO BRO MD         SYSTEM ID:  [id number]   XR Elbow Right G/E 3 Views    Narrative    EXAM: RIGHT ELBOW 2 VIEWS POST REDUCTION THROUGH PLASTER SLAB 12/30/2024 21:37    CLINICAL:  S42.401A Unspecified fracture of lower end of right humerus, initial encounter for closed fracture.    COMPARISON: Earlier today.    Impression    IMPRESSION:    The dislocation at the elbow has been reduced. Bone fragment or fragments at the anterior aspect of the elbow likely arose off the coronoid process of the ulna, with slight displacement.    REPORT SIGNED BY DR. Young Glass   X-ray rt Elbow G/E 3 vws    Narrative    EXAM: XR ELBOW RIGHT G/E 3 VIEWS  LOCATION: [facility]  DATE: 1/3/2025    INDICATION:  Elbow injury, right, initial encounter  COMPARISON: None.      Impression    IMPRESSION: No acute displaced fracture. Irregular ossific fragments projecting anterior to the coronoid process and adjacent to the sublime tubercle of the ulna which are likely sequelae of prior trauma. Elbow alignment is intact. Small elbow joint   effusion with elevation of the anterior fat pad.   XR Elbow Right 2 Views    Narrative    EXAM: XR ELBOW RIGHT 2 VIEWS  LOCATION: [facility]  DATE: 1/7/2025    INDICATION:  Dislocation of right elbow, initial encounter  COMPARISON: 01/03/2025      Impression    IMPRESSION: No acute displaced fracture. No malalignment on this single lateral view. Irregular ossification projects anterior to the coronoid process, likely sequelae of prior trauma. Possible small elbow joint effusion.   XR Elbow Right G/E 3 Views    Narrative    EXAM: XR ELBOW RIGHT G/E 3 VIEWS  LOCATION: [facility]  DATE:  1/10/2025    INDICATION:  Dislocation of right elbow, initial encounter, Elbow injury, right, initial encounter  COMPARISON: 01/03/2025.      Impression    IMPRESSION: Curvilinear fracture fragments along the anterior aspect of the elbow probably arising from the coronoid process and/or radial head. No bony bridging or mineralized callus formation. There is soft tissue swelling and a joint effusion.       Assessment:  1. Dislocation of right elbow, subsequent encounter    2. Elbow injury, right, subsequent encounter        Plan:  Discussed the assessment with the patient.  Follow up: as directed by Dr Marin, advised providing update via [employer] after the weekend  Overall pain under good control at visit today, using sling as directed since discontinuing posterior splint  Demonstrates improving active and passive ROM at the elbow, no clinical signs of dislocation  XR images independently visualized and reviewed with patient today in clinic  Imaging confirms good alignment in the elbow, reviewed and reassuring  Compression sleeve provided for comfort/support, can use if needed in the short term  Oral Tylenol and topical Voltaren gel reviewed as safe OTC options, reviewed safe dosing strategies  Has breakthrough pain medication if needed, has not used yet  Home handouts provided and supportive care reviewed  All questions were answered today  Contact us with additional questions or concerns  Signs and sx of concern reviewed      Anant Macdonald DO, CAQ  Sports Medicine Physician  [facility] Orthopedics and Sports Medicine          Disclaimer: This note consists of symbols derived from keyboarding, dictation and/or voice recognition software. As a result, there may be errors in the script that have gone undetected. Please consider this when interpreting information found in this chart.      Again, thank you for allowing me to participate in the care of your patient.        Sincerely,        Anant Macdonald,  DO    Electronically signed

## 2025-01-10 NOTE — PROGRESS NOTES
Carlos A Crespo  :  1965  DOS: 1/10/2025  MRN: [id number]    Sports Medicine Clinic Visit    PCP: Frances Upton    Carlos A Crespo is a 59 year old Right hand dominant male who is seen in consultation at the request of  Sterling Marin M.D., in follow-up, as a WALK IN patient presenting with right elbow dislocation.    Carlos A Crespo is now 11 days out from right elbow dislocation.  Since last visit on 2025 with Dr Marin patient notes increased swelling & deep aching pain after discontinuing the posterior splint.  Patient does note some bruising down his right forearm although not tender over this area.  Patient is able to supination/pronate right forearm & elbow flex to 90 degrees with some discomfort.  No obvious escobar deformity today.  Presents for repeat imaging & assessment at request of Dr Marin.       Review of Systems  Musculoskeletal: as above  Remainder of review of systems is negative including constitutional, CV, pulmonary, GI, Skin and Neurologic except as noted in HPI or medical history.    Past Medical History:   Diagnosis Date    Allergic rhinitis, cause unspecified     Arthritis     Cancer (H)     Carpal tunnel syndrome     RIght wrist off and on.    COPD (chronic obstructive pulmonary disease) (H)     Diabetes (H)     Hypertension     Infection due to 2019 novel coronavirus 2022    Low back pain     Scoliosis of thoracic spine      Past Surgical History:   Procedure Laterality Date    NO HISTORY OF SURGERY       Family History   Problem Relation Age of Onset    Hypertension Mother     Breast Cancer Mother     Diabetes Father     Hypertension Father     Pulmonary Embolism Father     Diabetes Maternal Uncle     Cerebrovascular Disease No family hx of     Coronary Artery Disease No family hx of        Objective  There were no vitals taken for this visit.    General: healthy, alert and in no distress    HEENT: no scleral icterus or conjunctival erythema   Skin: no suspicious lesions  or rash. No jaundice.   CV: regular rhythm by palpation, 2+ distal pulses, no pedal edema    Resp: normal respiratory effort without conversational dyspnea   Psych: normal mood and affect    Gait: nonantalgic, appropriate coordination and balance   Neuro: normal light touch sensory exam of the extremities. Motor strength as noted below     Right Wrist and Hand exam    Inspection:       Ecchymosis over the volar wrist    Tender:       No significant TTP    Non Tender:       distal radius right, RC joint,     anatomic snuffbox right,      scapholunate interval right, and      TFCC right    ROM:       Full wrist ROM without pain at the wrist, mild discomfort at the elbow    Strength:       Motor function intact    Neurovascular:       2+ radial pulses bilaterally with brisk capillary refill and      normal sensation to light touch in the radial, median and ulnar nerve distributions    Right Elbow exam:    Inspection:       edema noted moderate about the elbow, scattered ecchymosis    ROM:       Limited terminal ROM with flexion and extension but motor function intact, only mildly limited ROM without pain in pronation and supination    Strength:       Motor function intact as noted    Tender:       Generally about the elbow, no new focal TTP per patient    Sensation:       intact throughout hand       intact throughout forearm     Skin:       well perfused       capillary refill less than 2 seconds        Radiology:  Recent Results (from the past 744 hours)   XR Elbow RT G/E 3 vw    Narrative    ELBOW THREE VIEWS RIGHT  12/30/2024 3:51 PM     HISTORY: Elbow injury, right, initial encounter; pain  COMPARISON: None.      Impression    IMPRESSION: Posterior dislocation of the radius and ulna relative to  the humerus. There is a fracture fragment anteriorly, which may  originate from the coronoid process. There appears to be an elbow  joint effusion.    EUSEBIO BRO MD         SYSTEM ID:  [id number]   XR Elbow Right  G/E 3 Views    Narrative    EXAM: RIGHT ELBOW 2 VIEWS POST REDUCTION THROUGH PLASTER SLAB 12/30/2024 21:37    CLINICAL:  S42.401A Unspecified fracture of lower end of right humerus, initial encounter for closed fracture.    COMPARISON: Earlier today.    Impression    IMPRESSION:    The dislocation at the elbow has been reduced. Bone fragment or fragments at the anterior aspect of the elbow likely arose off the coronoid process of the ulna, with slight displacement.    REPORT SIGNED BY DR. Young Glass   X-ray rt Elbow G/E 3 vws    Narrative    EXAM: XR ELBOW RIGHT G/E 3 VIEWS  LOCATION: [facility]  DATE: 1/3/2025    INDICATION:  Elbow injury, right, initial encounter  COMPARISON: None.      Impression    IMPRESSION: No acute displaced fracture. Irregular ossific fragments projecting anterior to the coronoid process and adjacent to the sublime tubercle of the ulna which are likely sequelae of prior trauma. Elbow alignment is intact. Small elbow joint   effusion with elevation of the anterior fat pad.   XR Elbow Right 2 Views    Narrative    EXAM: XR ELBOW RIGHT 2 VIEWS  LOCATION: [facility]  DATE: 1/7/2025    INDICATION:  Dislocation of right elbow, initial encounter  COMPARISON: 01/03/2025      Impression    IMPRESSION: No acute displaced fracture. No malalignment on this single lateral view. Irregular ossification projects anterior to the coronoid process, likely sequelae of prior trauma. Possible small elbow joint effusion.   XR Elbow Right G/E 3 Views    Narrative    EXAM: XR ELBOW RIGHT G/E 3 VIEWS  LOCATION: [facility] [place]  DATE: 1/10/2025    INDICATION:  Dislocation of right elbow, initial encounter, Elbow injury, right, initial encounter  COMPARISON: 01/03/2025.      Impression    IMPRESSION: Curvilinear fracture fragments along the anterior aspect of the elbow probably arising from the coronoid process and/or radial  head. No bony bridging or mineralized callus formation. There is soft tissue swelling and a joint effusion.       Assessment:  1. Dislocation of right elbow, subsequent encounter    2. Elbow injury, right, subsequent encounter        Plan:  Discussed the assessment with the patient.  Follow up: as directed by Dr Marin, advised providing update via MyChart after the weekend  Overall pain under good control at visit today, using sling as directed since discontinuing posterior splint  Demonstrates improving active and passive ROM at the elbow, no clinical signs of dislocation  XR images independently visualized and reviewed with patient today in clinic  Imaging confirms good alignment in the elbow, reviewed and reassuring  Compression sleeve provided for comfort/support, can use if needed in the short term  Oral Tylenol and topical Voltaren gel reviewed as safe OTC options, reviewed safe dosing strategies  Has breakthrough pain medication if needed, has not used yet  Home handouts provided and supportive care reviewed  All questions were answered today  Contact us with additional questions or concerns  Signs and sx of concern reviewed      Anant Macdonald DO, CARLINE  Sports Medicine Physician  [facility] Orthopedics and Sports Medicine          Disclaimer: This note consists of symbols derived from keyboarding, dictation and/or voice recognition software. As a result, there may be errors in the script that have gone undetected. Please consider this when interpreting information found in this chart.

## 2025-01-15 ENCOUNTER — MYC REFILL (OUTPATIENT)
Dept: FAMILY MEDICINE | Facility: CLINIC | Age: 60
End: 2025-01-15
Payer: COMMERCIAL

## 2025-01-15 DIAGNOSIS — I10 HYPERTENSION GOAL BP (BLOOD PRESSURE) < 140/90: ICD-10-CM

## 2025-01-16 ENCOUNTER — THERAPY VISIT (OUTPATIENT)
Dept: OCCUPATIONAL THERAPY | Facility: CLINIC | Age: 60
End: 2025-01-16
Attending: STUDENT IN AN ORGANIZED HEALTH CARE EDUCATION/TRAINING PROGRAM
Payer: COMMERCIAL

## 2025-01-16 DIAGNOSIS — S53.104A DISLOCATION OF RIGHT ELBOW, INITIAL ENCOUNTER: ICD-10-CM

## 2025-01-16 RX ORDER — AMLODIPINE BESYLATE 10 MG/1
10 TABLET ORAL DAILY
Qty: 90 TABLET | Refills: 0 | Status: SHIPPED | OUTPATIENT
Start: 2025-01-16

## 2025-01-16 NOTE — PROGRESS NOTES
OCCUPATIONAL THERAPY EVALUATION  Type of Visit: Evaluation             Subjective : Its tender and stiff. Last Friday it was really stiff and I had some nerve pain. But, it is better now. Yeah, I slipped on ice when trying to get into my car. This happened right before the New Year. I think my range of motion looks good. I fractured my left elbow previously and that has done well. So, I am confident that this one will be okay        Presenting condition or subjective complaint: Right elbow  Date of onset: 12/30/24    Relevant medical history:     Past Medical History:   Diagnosis Date    Allergic rhinitis, cause unspecified     Arthritis     Cancer (H)     Carpal tunnel syndrome     RIght wrist off and on.    COPD (chronic obstructive pulmonary disease) (H)     Diabetes (H)     Hypertension     Infection due to 2019 novel coronavirus 04/17/2022    Low back pain     Scoliosis of thoracic spine        Dates & types of surgery:    Past Surgical History:   Procedure Laterality Date    NO HISTORY OF SURGERY           Prior diagnostic imaging/testing results: X-ray     Prior therapy history for the same diagnosis, illness or injury: No      Prior Level of Function  Transfers: Independent  Ambulation: Independent  ADL: Independent  IADL:  Independent    Living Environment  Social support: With a significant other or spouse   Type of home: [facility]   Stairs to enter the home: No       Ramp: No   Stairs inside the home: No       Help at home: Other  Equipment owned:       Employment: Yes ; 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), , reach truck  Hobbies/Interests: Bowling    Patient goals for therapy: Work with full motion of my right arm and elbow    Pain assessment: See objective evaluation for additional pain details     Objective   Objective:  Right hand dominant  Patient reports symptoms of pain, stiffness/loss of motion, and weakness/loss of strength    Pain Level (Scale 0-10)    1/16/2025   At Rest 0/10   With Use 5/10     Pain Description  Date 1/16/2025   Location shoulder and elbow   Pain Quality Burning and Shooting   Frequency intermittent     Pain is worst  daytime   Exacerbated by  Movement   Relieved by cold   Progression Inital eval     Edema  Wrist/Elbow  (Circumference measured in cm) 1/16/2025   Distal Wrist Crease R: 19.2, L: 19.2   Elbow Crease R: 30.5, L: 29.5       Sensation   Decreased Median Nerve distribution per pt report    ROM  Pain Report: - none  + mild    ++ moderate    +++ severe   Elbow 1/16/2025 1/16/2025   AROM (PROM) Right Left   Extension 36 5   Flexion 114 143   Supination 70 75   Pronation 80 75        Strength: Contraindicated at this time; patient is 2 weeks, 3 days post injury    Assessment & Plan   CLINICAL IMPRESSIONS  Medical Diagnosis: Dislocation of right elbow    Treatment Diagnosis: Right elbow pain, right elbow posterior dislocation    Impression/Assessment: Pt is a 59 year old male presenting to Occupational Therapy due to right elbow dislocation.  The following significant findings have been identified: Impaired activity tolerance, Impaired ROM, Impaired strength, and Pain.  These identified deficits interfere with their ability to perform work tasks, recreational activities, driving , and meal planning and preparation as compared to previous level of function.   Patient's limitations or Problem List includes: Pain, Decreased ROM/motion, Increased edema, and Weakness of the right elbow which interferes with the patient's ability to perform Work Tasks, Recreational Activities, Household Chores, and Driving  as compared to previous level of function.    Clinical Decision Making (Complexity):  Assessment of Occupational Performance: 1-3 Performance Deficits  Occupational Performance Limitations: driving and community mobility, home establishment and management, meal preparation and cleanup, work, and leisure activities  Clinical Decision Making  (Complexity): Low complexity    PLAN OF CARE  Treatment Interventions:  Modalities:  US  Therapeutic Exercise:  AROM, AAROM, PROM, Isotonics, and Isometrics  Neuromuscular re-education:  Nerve Gliding, Proprioceptive Training, and Kinesiotaping  Manual Techniques:  Joint mobilization and Manual edema mobilization  Orthotic Fabrication:  Static progressive and Long arm    Long Term Goals   OT Goal 1  Goal Identifier: Meal Preparation  Goal Description: Patient will be able to open a tight or new jar without pain or difficulty  Rationale: In order to maximize safety and independence with ADL/IADLs  Goal Progress: Initial Eval  Target Date: 03/13/25      Frequency of Treatment: 1x/week  Duration of Treatment: 8 weeks     Recommended Referrals to Other Professionals:  N/A  Education Assessment: Learner/Method: Patient;Listening;Reading;Demonstration;Pictures/Video;No Barriers to Learning  Education Comments: Verbalized good understanding     Risks and benefits of evaluation/treatment have been explained.   Patient/Family/caregiver agrees with Plan of Care.     Evaluation Time:    OT Eval, Low Complexity Minutes (37141): 15    Signing Clinician: AFSANEH Fried

## 2025-01-25 ENCOUNTER — HEALTH MAINTENANCE LETTER (OUTPATIENT)
Age: 60
End: 2025-01-25

## 2025-02-11 ENCOUNTER — ANCILLARY PROCEDURE (OUTPATIENT)
Dept: GENERAL RADIOLOGY | Facility: CLINIC | Age: 60
End: 2025-02-11
Attending: STUDENT IN AN ORGANIZED HEALTH CARE EDUCATION/TRAINING PROGRAM
Payer: COMMERCIAL

## 2025-02-11 ENCOUNTER — OFFICE VISIT (OUTPATIENT)
Dept: ORTHOPEDICS | Facility: CLINIC | Age: 60
End: 2025-02-11
Payer: COMMERCIAL

## 2025-02-11 VITALS — BODY MASS INDEX: 30.1 KG/M2 | WEIGHT: 215 LBS | HEIGHT: 71 IN

## 2025-02-11 DIAGNOSIS — S53.104D DISLOCATION OF RIGHT ELBOW, SUBSEQUENT ENCOUNTER: Primary | ICD-10-CM

## 2025-02-11 DIAGNOSIS — S53.104D DISLOCATION OF RIGHT ELBOW, SUBSEQUENT ENCOUNTER: ICD-10-CM

## 2025-02-11 PROCEDURE — 73080 X-RAY EXAM OF ELBOW: CPT | Mod: TC | Performed by: RADIOLOGY

## 2025-02-11 PROCEDURE — 99213 OFFICE O/P EST LOW 20 MIN: CPT | Performed by: STUDENT IN AN ORGANIZED HEALTH CARE EDUCATION/TRAINING PROGRAM

## 2025-02-11 NOTE — LETTER
2/11/2025      Carlos A Crespo  [address] [facility] [address]      Dear Colleague,    Thank you for referring your patient, Carlos A Crespo, to the [facility] [place]. Please see a copy of my visit note below.    Orthopaedic Surgery Hand and Upper Extremity Clinic H&P Note:  Date: Feb 11, 2025     Patient Name: Carlos A Crespo  MRN: [id number]    Consult requested by: Dr. Renée DO    CHIEF COMPLAINT: closed fracture dislocation of right elbow    Dominant Hand: right  Occupation:      2/11/25.- patient reports that he did 1 visit of OT and overall has progressed with home exercise program. He has not gone more due to financial reasons. ROM has improved but he still has limited terminal extension.  He does note some achy pain and continues to wear sling at night. He has returned to work on light duty.    HPI:  Mr. Carlos A Crespo is a 59 year old male right hand dominant who presents with right elbow injury. Injury occurred on 12/30/24 when patient slipped backwards on ice and fell on elbow, sustaining right elbow dislocation. He was seen at the ED on day of injury where closed reduction was performed.  Patient saw  Dr. Chinchilla on 1/3/25. He presents in long arm posterior splint today and sling. He was has intermittent pain    He notes he was prescribed ibuprofen 600mg, but this was causing shortness of breath so he has been taking half a dose. He was also prescribed percocent but has not needed to take this.       PMH  Diabetes: no  Thyroid Problems: no  Smoking: no      VITALS:  There were no vitals filed for this visit.    EXAM:  General: NAD, A&Ox3  HEENT: NC/AT  CV: RRR by peripheral pulse  Pulmonary: Non-labored breathing on RA  RUE:  Mild swelling with palpable effusion of the right elbow  Range of motion -130 without mild tenderness at terminal flexion and extension  full pronation and supination  Negative pivot shift, negative push  off test  Minimal pain with axial load  Intact EPL, FPL, intrinsics  Sensation intact to light touch all distributions  Warm well-perfused, capillary refill less than 2 seconds       IMAGING:    X-rays of the right elbow 12/30/2024 compared to those 1/3/2025.  These demonstrate a posterior elbow dislocation status post closed reduction with coronoid tip fracture.  This fracture fragment is irregular suggesting sequela of prior elbow trauma or degenerative change.    X-rays of the right elbow today demonstrates concentric elbow joint    I have personally reviewed the above images and labs.         IMPRESSION AND RECOMMENDATIONS:  Mr. Carlos A Crespo is a 59 year old male right hand dominant with right closed elbow dislocation and associated coronoid tip fracture.    Elbow is stable. He is making progress. I recommended he return to OT for a few sessions to maximize terminal flexion and extension and begin strengthening.    He is clear to weight bear as tolerated without restrictions. Advised him to discontinue sling entirely.    All questions answered.  The patient voiced understanding and agreement. Follow-up as needed    Sterling Marin MD    Hand, Upper Extremity & Microvascular Surgery  Department of Orthopaedic Surgery  [facility]          Again, thank you for allowing me to participate in the care of your patient.        Sincerely,        Sterling Marin MD    Electronically signed

## 2025-02-11 NOTE — LETTER
February 11, 2025      Carlos A Crespo  [address] [facility] [address]        To Whom It May Concern:    Carlos A Crespo was seen in our clinic. He may return to work on 2/12/25. Regarding his right upper extremity her can weight bear as tolerated.       Sincerely,          Sterling Marin MD       Electronically signed

## 2025-02-11 NOTE — PROGRESS NOTES
Orthopaedic Surgery Hand and Upper Extremity Clinic H&P Note:  Date: Feb 11, 2025     Patient Name: Carlos A Crespo  MRN: [id number]    Consult requested by: Dr. Renée DO    CHIEF COMPLAINT: closed fracture dislocation of right elbow    Dominant Hand: right  Occupation:      2/11/25.- patient reports that he did 1 visit of OT and overall has progressed with home exercise program. He has not gone more due to financial reasons. ROM has improved but he still has limited terminal extension.  He does note some achy pain and continues to wear sling at night. He has returned to work on light duty.    HPI:  Mr. Carlos A Crespo is a 59 year old male right hand dominant who presents with right elbow injury. Injury occurred on 12/30/24 when patient slipped backwards on ice and fell on elbow, sustaining right elbow dislocation. He was seen at the ED on day of injury where closed reduction was performed.  Patient saw  Dr. Chinchilla on 1/3/25. He presents in long arm posterior splint today and sling. He was has intermittent pain    He notes he was prescribed ibuprofen 600mg, but this was causing shortness of breath so he has been taking half a dose. He was also prescribed percocent but has not needed to take this.       PMH  Diabetes: no  Thyroid Problems: no  Smoking: no      VITALS:  There were no vitals filed for this visit.    EXAM:  General: NAD, A&Ox3  HEENT: NC/AT  CV: RRR by peripheral pulse  Pulmonary: Non-labored breathing on RA  RUE:  Mild swelling with palpable effusion of the right elbow  Range of motion -130 without mild tenderness at terminal flexion and extension  full pronation and supination  Negative pivot shift, negative push off test  Minimal pain with axial load  Intact EPL, FPL, intrinsics  Sensation intact to light touch all distributions  Warm well-perfused, capillary refill less than 2 seconds       IMAGING:    X-rays of the right elbow 12/30/2024 compared to those 1/3/2025.   These demonstrate a posterior elbow dislocation status post closed reduction with coronoid tip fracture.  This fracture fragment is irregular suggesting sequela of prior elbow trauma or degenerative change.    X-rays of the right elbow today demonstrates concentric elbow joint    I have personally reviewed the above images and labs.         IMPRESSION AND RECOMMENDATIONS:  Mr. Carlos A Crespo is a 59 year old male right hand dominant with right closed elbow dislocation and associated coronoid tip fracture.    Elbow is stable. He is making progress. I recommended he return to OT for a few sessions to maximize terminal flexion and extension and begin strengthening.    He is clear to weight bear as tolerated without restrictions. Advised him to discontinue sling entirely.    All questions answered.  The patient voiced understanding and agreement. Follow-up as needed    Sterling Marin MD    Hand, Upper Extremity & Microvascular Surgery  Department of Orthopaedic Surgery  [facility]

## 2025-04-19 DIAGNOSIS — I10 HYPERTENSION GOAL BP (BLOOD PRESSURE) < 140/90: ICD-10-CM

## 2025-04-21 RX ORDER — AMLODIPINE BESYLATE 10 MG/1
10 TABLET ORAL DAILY
Qty: 30 TABLET | Refills: 0 | Status: SHIPPED | OUTPATIENT
Start: 2025-04-21

## 2025-08-24 ENCOUNTER — OFFICE VISIT (OUTPATIENT)
Dept: URGENT CARE | Facility: URGENT CARE | Age: 60
End: 2025-08-24
Payer: COMMERCIAL

## 2025-08-24 VITALS
BODY MASS INDEX: 29.43 KG/M2 | DIASTOLIC BLOOD PRESSURE: 92 MMHG | TEMPERATURE: 97.6 F | OXYGEN SATURATION: 99 % | HEART RATE: 85 BPM | WEIGHT: 211 LBS | RESPIRATION RATE: 16 BRPM | SYSTOLIC BLOOD PRESSURE: 164 MMHG

## 2025-08-24 DIAGNOSIS — L50.9 URTICARIA: Primary | ICD-10-CM

## 2025-08-24 PROCEDURE — 99213 OFFICE O/P EST LOW 20 MIN: CPT | Performed by: EMERGENCY MEDICINE

## 2025-08-24 PROCEDURE — 3080F DIAST BP >= 90 MM HG: CPT | Performed by: EMERGENCY MEDICINE

## 2025-08-24 PROCEDURE — 3077F SYST BP >= 140 MM HG: CPT | Performed by: EMERGENCY MEDICINE

## 2025-08-24 PROCEDURE — 1126F AMNT PAIN NOTED NONE PRSNT: CPT | Performed by: EMERGENCY MEDICINE

## 2025-08-24 RX ORDER — METHYLPREDNISOLONE 4 MG/1
TABLET ORAL
Qty: 21 TABLET | Refills: 0 | Status: SHIPPED | OUTPATIENT
Start: 2025-08-24

## 2025-08-24 ASSESSMENT — PAIN SCALES - GENERAL: PAINLEVEL_OUTOF10: NO PAIN (0)
